# Patient Record
Sex: MALE | Race: WHITE | HISPANIC OR LATINO | Employment: UNEMPLOYED | URBAN - METROPOLITAN AREA
[De-identification: names, ages, dates, MRNs, and addresses within clinical notes are randomized per-mention and may not be internally consistent; named-entity substitution may affect disease eponyms.]

---

## 2018-04-25 ENCOUNTER — OFFICE VISIT (OUTPATIENT)
Dept: FAMILY MEDICINE CLINIC | Facility: CLINIC | Age: 53
End: 2018-04-25
Payer: COMMERCIAL

## 2018-04-25 VITALS
BODY MASS INDEX: 28.19 KG/M2 | RESPIRATION RATE: 18 BRPM | SYSTOLIC BLOOD PRESSURE: 122 MMHG | DIASTOLIC BLOOD PRESSURE: 76 MMHG | TEMPERATURE: 100.4 F | WEIGHT: 186 LBS | OXYGEN SATURATION: 97 % | HEART RATE: 94 BPM | HEIGHT: 68 IN

## 2018-04-25 DIAGNOSIS — R09.81 CONGESTION OF NASAL SINUS: ICD-10-CM

## 2018-04-25 DIAGNOSIS — B34.9 VIRAL ILLNESS: Primary | ICD-10-CM

## 2018-04-25 PROCEDURE — 99213 OFFICE O/P EST LOW 20 MIN: CPT | Performed by: NURSE PRACTITIONER

## 2018-04-25 PROCEDURE — 3008F BODY MASS INDEX DOCD: CPT | Performed by: NURSE PRACTITIONER

## 2018-04-25 RX ORDER — GUAIFENESIN 600 MG
1200 TABLET, EXTENDED RELEASE 12 HR ORAL EVERY 12 HOURS SCHEDULED
Qty: 14 TABLET | Refills: 0 | Status: SHIPPED | OUTPATIENT
Start: 2018-04-25 | End: 2018-10-25 | Stop reason: ALTCHOICE

## 2018-04-25 RX ORDER — FLUTICASONE PROPIONATE 50 MCG
2 SPRAY, SUSPENSION (ML) NASAL DAILY
Qty: 16 G | Refills: 0 | Status: SHIPPED | OUTPATIENT
Start: 2018-04-25 | End: 2018-10-25 | Stop reason: ALTCHOICE

## 2018-04-25 NOTE — PROGRESS NOTES
Assessment/Plan:    1  You could take NSAID for symptom relief  2  Make sure you drink plenty fluids weight all feel that  3  Follow up if condition changes or worsens     Diagnoses and all orders for this visit:    Viral illness  -     fluticasone (FLONASE) 50 mcg/act nasal spray; 2 sprays into each nostril daily  -     guaiFENesin (MUCINEX) 600 mg 12 hr tablet; Take 2 tablets (1,200 mg total) by mouth every 12 (twelve) hours    Congestion of nasal sinus  -     fluticasone (FLONASE) 50 mcg/act nasal spray; 2 sprays into each nostril daily  -     guaiFENesin (MUCINEX) 600 mg 12 hr tablet; Take 2 tablets (1,200 mg total) by mouth every 12 (twelve) hours          Subjective:      Patient ID: Ashley Coker is a 47 y o  male  A 69-year-old male presents with fever and cough for a day and half  Feels really sick  Cough is dry  Took some ibuprofen this morning  Helped little  Has a lot of postnasal drip  Body aches  The following portions of the patient's history were reviewed and updated as appropriate: allergies and current medications  Review of Systems   Constitutional: Positive for fatigue and fever  HENT: Positive for congestion and postnasal drip  Respiratory: Positive for cough  Cardiovascular: Negative  Objective:      /76   Pulse 94   Temp 100 4 °F (38 °C)   Resp 18   Ht 5' 7 7" (1 72 m)   Wt 84 4 kg (186 lb)   SpO2 97%   BMI 28 53 kg/m²          Physical Exam   Constitutional: He appears well-developed and well-nourished  HENT:   Head: Normocephalic  Right Ear: External ear normal    Left Ear: External ear normal    Nose: Right sinus exhibits maxillary sinus tenderness  Left sinus exhibits maxillary sinus tenderness  Mouth/Throat: Oropharynx is clear and moist    Cardiovascular: Normal rate, regular rhythm and normal heart sounds      Pulmonary/Chest:   Cough/dry

## 2018-04-25 NOTE — LETTER
April 25, 2018     Patient: Naya Sharma   YOB: 1965   Date of Visit: 4/25/2018       To Whom it May Concern:    Joaquim Agarwal was seen in my clinic on 4/25/2018  He may return to work on 4/27/18  If you have any questions or concerns, please don't hesitate to call           Sincerely,          Chuck Fox NP        CC: No Recipients

## 2018-04-25 NOTE — PATIENT INSTRUCTIONS
Viral Syndrome   AMBULATORY CARE:   Viral syndrome  is a term used for general symptoms of a viral infection that has no clear cause  Viruses are spread easily from person to person through the air and on shared items  Common symptoms include the following:   · Fever and chills    · A runny or stuffy nose     · Cough, sore throat, or hoarseness     · Headache, or pain and pressure around your eyes     · Muscle aches and joint pain     · Shortness of breath or wheezing     · Abdominal pain, cramps, and diarrhea     · Nausea, vomiting, or loss of appetite  Call 911 for the following:   · You have a seizure  · You cannot be woken  · You have chest pain or trouble breathing  Seek care immediately if:   · You have a stiff neck, a bad headache, and sensitivity to light  · You feel weak, dizzy, or confused  · You stop urinating or urinate a lot less than normal      · You cough up blood or thick, yellow or green, mucus  · You have severe abdominal pain or your abdomen is larger than usual   Contact your healthcare provider if:   · Your symptoms do not get better with treatment, or get worse, after 3 days  · You have a rash or ear pain  · You have burning when you urinate  · You have questions or concerns about your condition or care  Treatment for viral syndrome  may include medicines to manage your symptoms  You may  need any of the following:  · Acetaminophen  decreases pain and fever  It is available without a doctor's order  Ask how much medicine to take and how often to take it  Follow directions  Acetaminophen can cause liver damage if not taken correctly  · NSAIDs , such as ibuprofen, help decrease swelling, pain, and fever  NSAIDs can cause stomach bleeding or kidney problems in certain people  If you take blood thinner medicine, always ask your healthcare provider if NSAIDs are safe for you  Always read the medicine label and follow directions      · Cold medicine  helps decrease swelling, control a cough, and relieve chest or nasal congestion  · Saline nasal spray  helps decrease nasal congestion  · Take your medicine as directed  Contact your healthcare provider if you think your medicine is not helping or if you have side effects  Tell him of her if you are allergic to any medicine  Keep a list of the medicines, vitamins, and herbs you take  Include the amounts, and when and why you take them  Bring the list or the pill bottles to follow-up visits  Carry your medicine list with you in case of an emergency  Manage your symptoms:   · Drink liquids as directed  to prevent dehydration  Ask how much liquid to drink each day and which liquids are best for you  Ask if you should drink an oral rehydration solution (ORS)  An ORS has the right amounts of water, salts, and sugar you need to replace body fluids  This may help prevent dehydration caused by vomiting or diarrhea  Do not drink liquids with caffeine  Drinks with caffeine can make dehydration worse  · Get plenty of rest  to help your body heal  Take naps throughout the day  Ask your healthcare provider when you can return to work and your normal activities  · Use a cool mist humidifier  to help you breathe easier if you have nasal or chest congestion  Ask your healthcare provider how to use a cool mist humidifier  · Eat honey or use cough drops  to help decrease throat discomfort  Ask your healthcare provider how much honey you should eat each day  Cough drops are available without a doctor's order  Follow directions for taking cough drops  · Do not smoke and stay away from others who smoke  Nicotine and other chemicals in cigarettes and cigars can cause lung damage  Smoking can also delay healing  Ask your healthcare provider for information if you currently smoke and need help to quit  E-cigarettes or smokeless tobacco still contain nicotine   Talk to your healthcare provider before you use these products  · Wash your hands frequently  to prevent the spread of germs to others  Use soap and water  Use gel hand  when soap and water are not available  Wash your hands after you use the bathroom, cough, or sneeze  Wash your hands before you prepare or eat food  Follow up with your healthcare provider as directed:  Write down your questions so you remember to ask them during your visits  © 2017 [facility] Information is for End User's use only and may not be sold, redistributed or otherwise used for commercial purposes  All illustrations and images included in CareNotes® are the copyrighted property of A D A M , Inc  or [employer]h Payne  The above information is an  only  It is not intended as medical advice for individual conditions or treatments  Talk to your doctor, nurse or pharmacist before following any medical regimen to see if it is safe and effective for you

## 2018-04-26 ENCOUNTER — APPOINTMENT (EMERGENCY)
Dept: RADIOLOGY | Facility: HOSPITAL | Age: 53
End: 2018-04-26
Payer: COMMERCIAL

## 2018-04-26 ENCOUNTER — HOSPITAL ENCOUNTER (EMERGENCY)
Facility: HOSPITAL | Age: 53
Discharge: HOME | End: 2018-04-27
Attending: EMERGENCY MEDICINE | Admitting: EMERGENCY MEDICINE
Payer: COMMERCIAL

## 2018-04-26 DIAGNOSIS — R09.82 POSTNASAL DRIP: ICD-10-CM

## 2018-04-26 DIAGNOSIS — J20.9 ACUTE BRONCHITIS: Primary | ICD-10-CM

## 2018-04-26 PROCEDURE — 94640 AIRWAY INHALATION TREATMENT: CPT

## 2018-04-26 PROCEDURE — 71046 X-RAY EXAM CHEST 2 VIEWS: CPT

## 2018-04-26 RX ORDER — IPRATROPIUM BROMIDE AND ALBUTEROL SULFATE 2.5; .5 MG/3ML; MG/3ML
3 SOLUTION RESPIRATORY (INHALATION) ONCE
Status: COMPLETED | OUTPATIENT
Start: 2018-04-26 | End: 2018-04-26

## 2018-04-26 RX ORDER — HYDROCODONE POLISTIREX AND CHLORPHENIRAMINE POLISTIREX 10; 8 MG/5ML; MG/5ML
5 SUSPENSION, EXTENDED RELEASE ORAL EVERY 12 HOURS PRN
Status: DISCONTINUED | OUTPATIENT
Start: 2018-04-26 | End: 2018-04-27 | Stop reason: HOSPADM

## 2018-04-26 RX ORDER — PREDNISONE 20 MG/1
60 TABLET ORAL ONCE
Status: COMPLETED | OUTPATIENT
Start: 2018-04-26 | End: 2018-04-26

## 2018-04-26 RX ADMIN — IPRATROPIUM BROMIDE AND ALBUTEROL SULFATE 3 ML: .5; 3 SOLUTION RESPIRATORY (INHALATION) at 23:48

## 2018-04-26 RX ADMIN — PREDNISONE 60 MG: 20 TABLET ORAL at 23:48

## 2018-04-27 VITALS
SYSTOLIC BLOOD PRESSURE: 142 MMHG | BODY MASS INDEX: 28.25 KG/M2 | HEART RATE: 98 BPM | HEIGHT: 67 IN | RESPIRATION RATE: 20 BRPM | OXYGEN SATURATION: 98 % | DIASTOLIC BLOOD PRESSURE: 81 MMHG | TEMPERATURE: 100.3 F | WEIGHT: 180 LBS

## 2018-04-27 PROCEDURE — 99283 EMERGENCY DEPT VISIT LOW MDM: CPT

## 2018-04-27 RX ORDER — IBUPROFEN 600 MG/1
600 TABLET ORAL ONCE
Status: COMPLETED | OUTPATIENT
Start: 2018-04-27 | End: 2018-04-27

## 2018-04-27 RX ORDER — PREDNISONE 50 MG/1
50 TABLET ORAL DAILY
Qty: 5 TABLET | Refills: 0 | Status: SHIPPED | OUTPATIENT
Start: 2018-04-27 | End: 2018-05-02

## 2018-04-27 RX ORDER — ALBUTEROL SULFATE 90 UG/1
2 AEROSOL, METERED RESPIRATORY (INHALATION) EVERY 4 HOURS PRN
Qty: 1 INHALER | Refills: 0 | Status: SHIPPED | OUTPATIENT
Start: 2018-04-27 | End: 2020-02-27

## 2018-04-27 RX ORDER — FLUTICASONE PROPIONATE 50 MCG
2 SPRAY, SUSPENSION (ML) NASAL DAILY
Qty: 1 BOTTLE | Refills: 0 | Status: SHIPPED | OUTPATIENT
Start: 2018-04-27 | End: 2018-10-25 | Stop reason: ALTCHOICE

## 2018-04-27 RX ORDER — PROMETHAZINE HYDROCHLORIDE AND CODEINE PHOSPHATE 6.25; 1 MG/5ML; MG/5ML
5 SYRUP ORAL EVERY 4 HOURS PRN
Qty: 120 ML | Refills: 0 | Status: SHIPPED | OUTPATIENT
Start: 2018-04-27 | End: 2018-05-07

## 2018-04-27 RX ADMIN — IBUPROFEN 600 MG: 600 TABLET, FILM COATED ORAL at 00:21

## 2018-04-27 RX ADMIN — HYDROCODONE POLISTIREX AND CHLORPHENIRAMINE POLISTIREX 5 ML: 10; 8 SUSPENSION, EXTENDED RELEASE ORAL at 00:21

## 2018-04-27 NOTE — DISCHARGE INSTRUCTIONS
Please take a list of all of your medications and discharge paperwork with you to all of your follow-up medical visits  Please take all of your medications as directed  Please call your family doctor or return to the ER if you have increased shortness of breath, chest pain, fevers, chills, nausea, vomiting, diarrhea, or any other worsening symptoms  Acute Bronchitis   WHAT YOU SHOULD KNOW:   Acute bronchitis is swelling and irritation in the air passages of your lungs  This irritation may cause you to cough or have other breathing problems  Acute bronchitis often starts because of another viral illness, such as a cold or the flu  The illness spreads from your nose and throat to your windpipe and airways  Bronchitis is often called a chest cold  Acute bronchitis lasts about 2 weeks and is usually not a serious illness  AFTER YOU LEAVE:   Medicines:   · Ibuprofen or acetaminophen:  These medicines help lower a fever  They are available without a doctor's order  Ask your healthcare provider which medicine is right for you  Ask how much to take and how often to take it  Follow directions  These medicines can cause stomach bleeding if not taken correctly  Ibuprofen can cause kidney damage  Do not take ibuprofen if you have kidney disease, an ulcer, or allergies to aspirin  Acetaminophen can cause liver damage  Do not drink alcohol if you take acetaminophen  · Cough medicine: This medicine helps loosen mucus in your lungs and make it easier to cough up  This can help you breathe easier  · Inhalers: You may need one or more inhalers to help you breathe easier and cough less  An inhaler gives your medicine in a mist form so that you can breathe it into your lungs  Ask your healthcare provider to show you how to use your inhaler correctly  · Steroid medicine:  Steroid medicine helps open your air passages so you can breathe easier  · Take your medicine as directed    Call your healthcare provider if you think your medicine is not helping or if you have side effects  Tell him if you are allergic to any medicine  Keep a list of the medicines, vitamins, and herbs you take  Include the amounts, and when and why you take them  Bring the list or the pill bottles to follow-up visits  Carry your medicine list with you in case of an emergency  How to use an inhaler:   · Shake the inhaler well to make sure you get the correct amount of medicine per puff  Remove the cover from your inhaler's mouthpiece  If you are using a spacer, connect your inhaler to the flat end of the spacer  · Exhale as much air from your lungs as you can  Put the mouthpiece in your mouth past your front teeth and rest it on the top of your tongue  Do not block the mouthpiece opening with your tongue  · Breathe in through your mouth at a slow and steady rate  As you do this, press the inhaler to release the puff of medicine  Finish breathing in slowly and deeply as you inhale the medicine  When your lungs are full, hold your breath for 10 seconds  Then breathe out slowly through puckered lips or through your nose  · If you need to take more puffs, wait at least 1 minute between each puff  · Rinse your mouth with water after you use the inhaler  This may keep you from getting a mouth infection or irritation  · Follow the instructions that come with your inhaler to clean it  You should clean your inhaler at least once a week  Ways to care for yourself:   · Avoid alcohol:  Alcohol dulls your urge to cough and sneeze  When you have bronchitis, you need to be able to cough and sneeze to clear your air passages  Alcohol also causes your body to lose fluid  This can make the mucus in your lungs thicker and harder to cough up  · Avoid irritants in the air:  Do not smoke or allow others to smoke around you  Avoid chemicals, fumes, and dust  Wear a face mask if you must work around dust or fumes   Stay inside on days when air pollution levels are high  If you have allergies, stay inside when pollen counts are high  Avoid aerosol products  This includes spray-on deodorant, bug spray, and hair spray  · Drink more liquids:  Most people should drink at least 8 eight-ounce cups of water a day  You may need to drink more liquids when you have acute bronchitis  Liquids help keep your air passages moist and help you cough up mucus  · Get more rest:  You may feel like resting more  Slowly start to do more each day  Rest when you feel it is needed  · Eat healthy foods:  Eat a variety healthy foods every day  Your diet should include fruits, vegetables, breads, and protein (such as chicken, fish, and beans)  Dairy products (such as milk, cheese, and ice cream) can sometimes increase the amount of mucus your body makes  Ask if you should decrease your intake of dairy products  · Use a humidifier:  Use a cool mist humidifier to increase air moisture in your home  This may make it easier for you to breathe and help decrease your cough  Decrease your risk of acute bronchitis:   · Get the vaccinations you need:  Ask your healthcare provider if you should get vaccinated against the flu or pneumonia  · Avoid things that may irritate your lungs:  Stay inside or cover your mouth and nose with a scarf when you are outside during cold weather  You should also stay inside on days when air pollution levels are high  If you have allergies, stay inside when pollen counts are high  Avoid using aerosol products in your home  This includes spray-on deodorant, bug spray, and hair spray  · Avoid the spread of germs:        [facility] your hands often with soap and water  Carry germ-killing gel with you  You can use the gel to clean your hands when there is no soap and water available  ¨ Do not touch your eyes, nose, or mouth unless you have washed your hands first     ¨ Always cover your mouth when you cough   Cough into a tissue or your shirtsleeve so you do not spread germs from your hands  ¨ Try to avoid people who have a cold or the flu  If you are sick, stay away from others as much as possible  Follow up with your healthcare provider as directed:  Write down questions you have so you will remember to ask them during your follow-up visits  Contact your healthcare provider if:   · You have a fever  · Your skin becomes itchy or you have a rash after you take your medicine  · Your breathing problems do not go away or get worse  · Your cough does not get better with treatment  · You cough up blood  · You have questions or concerns about your condition or care  Seek care immediately or call 911 if:   · You faint  · Your lips or fingernails turn blue  · You feel like you are not getting enough air when you breathe  · You have swelling of your lips, tongue, or throat that makes it hard to breathe or swallow  © 2014 [employer] is for End User's use only and may not be sold, redistributed or otherwise used for commercial purposes  All illustrations and images included in CareNotes® are the copyrighted property of A D A M , Inc  or [employer]h Payne  The above information is an  only  It is not intended as medical advice for individual conditions or treatments  Talk to your doctor, nurse or pharmacist before following any medical regimen to see if it is safe and effective for you

## 2018-04-27 NOTE — ED PROVIDER NOTES
History  Chief Complaint   Patient presents with    Cough     states that he has had a dry cough for 4 days, cannot sleep and cough irritating his throat  Seen at [facility] yesterday and told to take mucinex  Mucinex not helping  65-year-old otherwise healthy male presents to the ER with complaint of dry cough and postnasal drip for the past 4 days  Patient was seen by his PCP yesterday and discussed supportive care and started on Mucinex  Patient states that Mucinex case not helping  Patient has subjective fever last night  Patient came to the ER for further evaluation  Patient has mild shortness of breath but denies any chest pain, nausea, vomiting, diarrhea, dysuria, abdominal pain, headaches, weakness, dizziness  History provided by:  Patient  Cough   Associated symptoms: shortness of breath    Associated symptoms: no chest pain, no fever, no headaches, no sore throat and no wheezing        Prior to Admission Medications   Prescriptions Last Dose Informant Patient Reported? Taking?   fluticasone (FLONASE) 50 mcg/act nasal spray 2018 at Unknown time  No Yes   Si sprays into each nostril daily   guaiFENesin (MUCINEX) 600 mg 12 hr tablet 2018 at Unknown time  No Yes   Sig: Take 2 tablets (1,200 mg total) by mouth every 12 (twelve) hours      Facility-Administered Medications: None       History reviewed  No pertinent past medical history  History reviewed  No pertinent surgical history  Family History   Problem Relation Age of Onset    No Known Problems Mother     No Known Problems Father      I have reviewed and agree with the history as documented  Social History   Substance Use Topics    Smoking status: Never Smoker    Smokeless tobacco: Never Used    Alcohol use No        Review of Systems   Constitutional: Negative for activity change, fatigue and fever  HENT: Negative for congestion, ear discharge and sore throat  Eyes: Negative for pain and redness  Respiratory: Positive for cough and shortness of breath  Negative for chest tightness and wheezing  Cardiovascular: Negative for chest pain  Gastrointestinal: Negative for abdominal pain, diarrhea, nausea and vomiting  Endocrine: Negative for cold intolerance  Genitourinary: Negative for dysuria and urgency  Musculoskeletal: Negative for arthralgias and back pain  Neurological: Negative for dizziness, weakness and headaches  Psychiatric/Behavioral: Negative for agitation and behavioral problems  Physical Exam  ED Triage Vitals [04/26/18 2233]   Temperature Pulse Respirations Blood Pressure SpO2   100 1 °F (37 8 °C) 102 20 127/69 98 %      Temp Source Heart Rate Source Patient Position - Orthostatic VS BP Location FiO2 (%)   Tympanic Monitor Sitting Left arm --      Pain Score       9           Orthostatic Vital Signs  Vitals:    04/26/18 2233 04/27/18 0013   BP: 127/69 142/81   Pulse: 102 98   Patient Position - Orthostatic VS: Sitting        Physical Exam   Constitutional: He is oriented to person, place, and time  He appears well-developed and well-nourished  HENT:   Head: Normocephalic and atraumatic  Right Ear: External ear normal    Left Ear: External ear normal    Nose: Nose normal    Mouth/Throat: Oropharynx is clear and moist    Erythematous posterior oropharynx without any exudates  Eyes: Conjunctivae and EOM are normal    Neck: Normal range of motion  Neck supple  Cardiovascular: Normal rate, regular rhythm and normal heart sounds  Pulmonary/Chest: Effort normal and breath sounds normal    Lungs are clear to auscultation  Abdominal: Soft  Bowel sounds are normal  He exhibits no distension  There is no tenderness  Musculoskeletal: Normal range of motion  Neurological: He is alert and oriented to person, place, and time  Skin: Skin is warm  Psychiatric: He has a normal mood and affect   His behavior is normal  Judgment and thought content normal    Nursing note and vitals reviewed  ED Medications  Medications   hydrocodone-chlorpheniramine polistirex (TUSSIONEX) 10-8 mg/5 mL ER suspension 5 mL (5 mL Oral Given 4/27/18 0021)   ipratropium-albuterol (DUO-NEB) 0 5-2 5 mg/3 mL inhalation solution 3 mL (3 mL Nebulization Given 4/26/18 2348)   predniSONE tablet 60 mg (60 mg Oral Given 4/26/18 2348)   ibuprofen (MOTRIN) tablet 600 mg (600 mg Oral Given 4/27/18 0021)       Diagnostic Studies  Results Reviewed     None                 XR chest 2 views    (Results Pending)              Procedures  Procedures       Phone Contacts  ED Phone Contact    ED Course                               MDM  Number of Diagnoses or Management Options  Acute bronchitis: new and requires workup  Postnasal drip: new and requires workup  Diagnosis management comments: Obtain x-ray to rule out any acute abnormalities  Give prednisone, neb treatment, Tussionex and reassess symptoms       Amount and/or Complexity of Data Reviewed  Tests in the radiology section of CPT®: reviewed and ordered  Tests in the medicine section of CPT®: ordered and reviewed  Independent visualization of images, tracings, or specimens: yes    Risk of Complications, Morbidity, and/or Mortality  General comments: Chest x-ray is unremarkable  Patient's symptoms mildly improved with neb treatment, prednisone, Tussionex  At this point patient's symptoms are consistent with bronchitis and postnasal drip  Patient is discharged home on prednisone burst, cough syrup, Flonase, albuterol with follow-up to PCP in 1 week  Close return instructions given to return to the ER for any worsening symptoms  Patient agrees with discharge plan  Patient well appearing at time of discharge        Patient Progress  Patient progress: improved    CritCare Time    Disposition  Final diagnoses:   Acute bronchitis   Postnasal drip     Time reflects when diagnosis was documented in both MDM as applicable and the Disposition within this note     Time User Action Codes Description Comment    4/27/2018  1:09 AM Vernadine Showers, Gregor Douglas Add [J20 [id number]] Acute bronchitis     4/27/2018  1:09 AM FerdousGregor Douglas Add [R09 [id number]] Postnasal drip       ED Disposition     ED Disposition Condition Comment    Discharge  Barbara Barefoot discharge to home/self care  Condition at discharge: Good        Follow-up Information     Follow up With Specialties Details Why [address]  In 1 week  Jasen Gayle [id number]        Patient's Medications   Discharge Prescriptions    ALBUTEROL (PROVENTIL HFA,VENTOLIN HFA) 90 MCG/ACT INHALER    Inhale 2 puffs every 4 (four) hours as needed for wheezing       Start Date: 4/27/2018 End Date: --       Order Dose: 2 puffs       Quantity: 1 Inhaler    Refills: 0    FLUTICASONE (FLONASE) 50 MCG/ACT NASAL SPRAY    2 sprays into each nostril daily for 30 days       Start Date: 4/27/2018 End Date: 5/27/2018       Order Dose: 2 sprays       Quantity: 1 Bottle    Refills: 0    PREDNISONE 50 MG TABLET    Take 1 tablet (50 mg total) by mouth daily for 5 days       Start Date: 4/27/2018 End Date: 5/2/2018       Order Dose: 50 mg       Quantity: 5 tablet    Refills: 0    PROMETHAZINE-CODEINE (PHENERGAN WITH CODEINE) 6 25-10 MG/5 ML SYRUP    Take 5 mL by mouth every 4 (four) hours as needed for cough for up to 10 days       Start Date: 4/27/2018 End Date: 5/7/2018       Order Dose: 5 mL       Quantity: 120 mL    Refills: 0     No discharge procedures on file      ED Provider  Electronically Signed by           Chu Robert DO  04/27/18 6266

## 2018-08-28 ENCOUNTER — APPOINTMENT (OUTPATIENT)
Dept: LAB | Facility: HOSPITAL | Age: 53
End: 2018-08-28

## 2018-08-28 ENCOUNTER — TRANSCRIBE ORDERS (OUTPATIENT)
Dept: LAB | Facility: HOSPITAL | Age: 53
End: 2018-08-28

## 2018-08-28 DIAGNOSIS — Z00.8 HEALTH EXAMINATION IN POPULATION SURVEY: Primary | ICD-10-CM

## 2018-08-28 DIAGNOSIS — Z00.8 HEALTH EXAMINATION IN POPULATION SURVEY: ICD-10-CM

## 2018-08-28 LAB
CHOLEST SERPL-MCNC: 218 MG/DL (ref 50–200)
EST. AVERAGE GLUCOSE BLD GHB EST-MCNC: 117 MG/DL
HBA1C MFR BLD: 5.7 % (ref 4.2–6.3)
HDLC SERPL-MCNC: 30 MG/DL (ref 40–60)
LDLC SERPL CALC-MCNC: 118 MG/DL (ref 0–100)
NONHDLC SERPL-MCNC: 188 MG/DL
TRIGL SERPL-MCNC: 350 MG/DL

## 2018-08-28 PROCEDURE — 83036 HEMOGLOBIN GLYCOSYLATED A1C: CPT

## 2018-08-28 PROCEDURE — 36415 COLL VENOUS BLD VENIPUNCTURE: CPT

## 2018-08-28 PROCEDURE — 80061 LIPID PANEL: CPT

## 2018-10-25 ENCOUNTER — OFFICE VISIT (OUTPATIENT)
Dept: FAMILY MEDICINE CLINIC | Facility: CLINIC | Age: 53
End: 2018-10-25
Payer: COMMERCIAL

## 2018-10-25 VITALS
SYSTOLIC BLOOD PRESSURE: 138 MMHG | HEART RATE: 90 BPM | BODY MASS INDEX: 28.19 KG/M2 | OXYGEN SATURATION: 97 % | RESPIRATION RATE: 20 BRPM | DIASTOLIC BLOOD PRESSURE: 80 MMHG | TEMPERATURE: 97.7 F | WEIGHT: 180 LBS

## 2018-10-25 DIAGNOSIS — J98.8 NARROWING OF AIRWAY: ICD-10-CM

## 2018-10-25 DIAGNOSIS — J01.90 ACUTE SINUSITIS, RECURRENCE NOT SPECIFIED, UNSPECIFIED LOCATION: Primary | ICD-10-CM

## 2018-10-25 PROCEDURE — 99202 OFFICE O/P NEW SF 15 MIN: CPT | Performed by: FAMILY MEDICINE

## 2018-10-25 RX ORDER — FLUTICASONE PROPIONATE 50 MCG
1 SPRAY, SUSPENSION (ML) NASAL DAILY
Qty: 16 G | Refills: 0 | Status: SHIPPED | OUTPATIENT
Start: 2018-10-25 | End: 2020-02-03

## 2018-10-25 RX ORDER — AMOXICILLIN AND CLAVULANATE POTASSIUM 875; 125 MG/1; MG/1
1 TABLET, FILM COATED ORAL EVERY 12 HOURS SCHEDULED
Qty: 10 TABLET | Refills: 0 | Status: SHIPPED | OUTPATIENT
Start: 2018-10-25 | End: 2018-10-30

## 2018-10-25 RX ORDER — AMOXICILLIN AND CLAVULANATE POTASSIUM 875; 125 MG/1; MG/1
1 TABLET, FILM COATED ORAL EVERY 12 HOURS SCHEDULED
Qty: 10 TABLET | Refills: 0 | Status: SHIPPED | OUTPATIENT
Start: 2018-10-25 | End: 2018-10-25 | Stop reason: CLARIF

## 2018-10-25 NOTE — PROGRESS NOTES
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

## 2018-10-28 NOTE — PROGRESS NOTES
Assessment/Plan:    No problem-specific Assessment & Plan notes found for this encounter  Diagnoses and all orders for this visit:    Acute sinusitis, recurrence not specified, unspecified location  -     amoxicillin-clavulanate (AUGMENTIN) 875-125 mg per tablet; Take 1 tablet by mouth every 12 (twelve) hours for 5 days  -     dextromethorphan-guaifenesin (MUCINEX DM)  MG per 12 hr tablet; Take 1 tablet by mouth every 12 (twelve) hours  -     fluticasone (FLONASE) 50 mcg/act nasal spray; 1 spray into each nostril daily    Narrowing of airway  -     Ambulatory Referral to Otolaryngology; Future        Subjective:      Patient ID: Maicol Mondragon is a 46 y o  male  HPI    Patient present with increased throat pain and coughing since last night  He started feeling sick 2-3 days ago and it began with just a productive cough  The cough and throat pain have progressively gotten worse since then to the point of preventing the patient from sleeping  Patient feels increased pressure on his palate and sinuses and dripping in to the back of his throat      Patient also complains of headaches, runny nose, chest pain when clearing throat and shortness of breath when trying to clear phlegm  He also endorses a subjective fever earlier today afternoon while driving  Patient denies nausea, vomiting, diarrhea, constipation and rashes      History of sinus problems for which he saw an ENT doctor 2 year ago  He was diagnosed with a narrowed airway at that time and recommended to get surgery  Patient has apnea sometime while he sleeps due to this problem  He has no gotten the surgery done yet due to insurance reasons      Patient has no medical history, takes no medication, and has no allergies  He denies tobacco, alcohol or illicit drug use  Patient has not gotten his flu shot this year  Review of Systems   Constitutional: Positive for fever  Negative for fatigue and unexpected weight change     HENT: Positive for sinus pressure and sore throat  Negative for rhinorrhea  Respiratory: Positive for apnea and cough  Negative for shortness of breath  Cardiovascular: Negative for chest pain and leg swelling  Gastrointestinal: Negative for abdominal distention, abdominal pain, constipation, diarrhea, nausea and vomiting  Genitourinary: Negative for difficulty urinating  Skin: Negative  Neurological: Negative for dizziness and headaches  Objective:  /80   Pulse 90   Temp 97 7 °F (36 5 °C)   Resp 20   Wt 81 6 kg (180 lb)   SpO2 97%   BMI 28 19 kg/m²        Physical Exam   Constitutional: He is oriented to person, place, and time  He appears well-developed and well-nourished  No distress  HENT:   Head: Normocephalic and atraumatic  Right Ear: A middle ear effusion is present  Left Ear: Tympanic membrane is erythematous  A middle ear effusion is present  Mouth/Throat: No oropharyngeal exudate or posterior oropharyngeal erythema  Narrowed airway   Eyes: Pupils are equal, round, and reactive to light  Neck: Normal range of motion  Neck supple  Cardiovascular: Normal rate, regular rhythm and normal heart sounds  Exam reveals no gallop and no friction rub  No murmur heard  Pulmonary/Chest: Effort normal and breath sounds normal  No respiratory distress  He has no wheezes  He has no rales  Abdominal: Soft  Bowel sounds are normal  He exhibits no distension  There is no tenderness  There is no rebound and no guarding  Musculoskeletal: He exhibits no edema  Lymphadenopathy:     He has cervical adenopathy  Neurological: He is alert and oriented to person, place, and time  Skin: Skin is warm and dry  No rash noted  He is not diaphoretic  No erythema  No pallor  Psychiatric: He has a normal mood and affect  His behavior is normal    Vitals reviewed          Elam Meigs, MD

## 2019-07-24 ENCOUNTER — OFFICE VISIT (OUTPATIENT)
Dept: FAMILY MEDICINE CLINIC | Facility: CLINIC | Age: 54
End: 2019-07-24
Payer: COMMERCIAL

## 2019-07-24 VITALS
DIASTOLIC BLOOD PRESSURE: 80 MMHG | HEIGHT: 67 IN | HEART RATE: 100 BPM | BODY MASS INDEX: 30.45 KG/M2 | OXYGEN SATURATION: 100 % | SYSTOLIC BLOOD PRESSURE: 122 MMHG | TEMPERATURE: 98.5 F | WEIGHT: 194 LBS

## 2019-07-24 DIAGNOSIS — R19.7 DIARRHEA, UNSPECIFIED TYPE: Primary | ICD-10-CM

## 2019-07-24 PROBLEM — B34.9 VIRAL ILLNESS: Status: RESOLVED | Noted: 2018-04-25 | Resolved: 2019-07-24

## 2019-07-24 PROCEDURE — 99213 OFFICE O/P EST LOW 20 MIN: CPT | Performed by: NURSE PRACTITIONER

## 2019-07-24 NOTE — PROGRESS NOTES
Assessment/Plan:  1  Take Pepto-Bismol to slow down the transit of diarrhea  2  Make sure you replace her fluids while feeling ill  3  You can modify her diet to a more binding diet like up brat diet  4  Follow up if condition changes or worsens       Diagnoses and all orders for this visit:    Diarrhea, unspecified type          Subjective:      Patient ID: Ed Beck is a 53 y o  male  58-year-old male presents with diarrhea for approximately 3 days  Just returned from [facility] on Monday  Reports the diarrhea started when he returned  Reports some intestinal cramping and distress  Denies any changes in appetite  Denies nausea/vomiting  Denies medications  Denies fever  Reports that the pain is generalized in his abdomen  The following portions of the patient's history were reviewed and updated as appropriate: allergies and current medications  Review of Systems   Constitutional: Negative  Respiratory: Negative  Cardiovascular: Negative  Gastrointestinal: Positive for diarrhea  Objective:      /80   Pulse 100   Temp 98 5 °F (36 9 °C)   Ht 5' 7" (1 702 m)   Wt 88 kg (194 lb)   SpO2 100%   BMI 30 38 kg/m²          Physical Exam   Constitutional: He appears well-developed and well-nourished  Cardiovascular: Normal rate and regular rhythm  Pulmonary/Chest: Effort normal and breath sounds normal    Abdominal: Soft   Bowel sounds are normal

## 2019-07-29 ENCOUNTER — OFFICE VISIT (OUTPATIENT)
Dept: FAMILY MEDICINE CLINIC | Facility: CLINIC | Age: 54
End: 2019-07-29
Payer: COMMERCIAL

## 2019-07-29 VITALS
OXYGEN SATURATION: 94 % | HEART RATE: 99 BPM | WEIGHT: 190.5 LBS | BODY MASS INDEX: 29.9 KG/M2 | SYSTOLIC BLOOD PRESSURE: 126 MMHG | HEIGHT: 67 IN | TEMPERATURE: 97.6 F | DIASTOLIC BLOOD PRESSURE: 74 MMHG

## 2019-07-29 DIAGNOSIS — E66.3 OVERWEIGHT (BMI 25.0-29.9): ICD-10-CM

## 2019-07-29 DIAGNOSIS — Z12.11 ENCOUNTER FOR SCREENING COLONOSCOPY: ICD-10-CM

## 2019-07-29 DIAGNOSIS — G47.33 OSA (OBSTRUCTIVE SLEEP APNEA): ICD-10-CM

## 2019-07-29 DIAGNOSIS — Z00.00 PHYSICAL EXAM: Primary | ICD-10-CM

## 2019-07-29 PROCEDURE — 99213 OFFICE O/P EST LOW 20 MIN: CPT | Performed by: FAMILY MEDICINE

## 2019-07-29 PROCEDURE — 3008F BODY MASS INDEX DOCD: CPT | Performed by: FAMILY MEDICINE

## 2019-07-29 PROCEDURE — 1036F TOBACCO NON-USER: CPT | Performed by: FAMILY MEDICINE

## 2019-07-29 NOTE — PROGRESS NOTES
Assessment/Plan:  Encounter for Screening colonoscopy  Patient follows with [facility] is here for referral for repeat colonoscopy as per patient he was found to have 12 polyps 4 years ago and is due a colonoscopy this year  Ambulatory referral to gastroenterology for colonoscopy was ordered  Overweight  Patient has a BMI of 29 likely due to increased caloric intake versus decreased caloric output  I advised the patient on exercise at least 30 min for 5 days a week as well as less meets fatty foods and processed foods and more plan based office diet  I also ordered a lipid panel and a CMP to look for any signs of hyper triglyceridemia or mixed hyperlipidemia as well as signs of fatty liver  I will follow up the results with the patient  MAIRO  Suspected hip  Next IM under measured 18 in  In males obstructive sleep apnea may be considered in neck diameter is greater than 17 in  I ordered an ambulatory referral to pulmonology for the patient to undergo a sleep study to determine if he does in fact have obstructive sleep apnea  Subjective:      Patient ID: Kylee Herbert is a 53 y o  male  HPI    41-year-old male with no previous hospitalizations or surgeries presents for physical examination  The patient worked in [employer] for 25 years before being let go and now works as a   The patient has no significant family history for diabetes coronary artery disease COPD  The patient denies any recent fever chills fatigue difficulty swallowing chest pain shortness of breath nausea vomiting diarrhea constipation loss of consciousness or lightheadedness  The patient does report instances where he will be trying to fall asleep and then will wake up suddenly to catch his breath as he is fearful of not getting in enough oxygen      The following portions of the patient's history were reviewed and updated as appropriate: allergies, current medications, past family history, past medical history, past social history, past surgical history and problem list     Review of Systems  Per HPI    Objective:      /74 (BP Location: Left arm, Patient Position: Sitting, Cuff Size: Large)   Pulse 99   Temp 97 6 °F (36 4 °C) (Tympanic)   Ht 5' 7" (1 702 m)   Wt 86 4 kg (190 lb 8 oz)   SpO2 94%   BMI 29 84 kg/m²          Physical Exam   Constitutional: He is oriented to person, place, and time  He appears well-developed  HENT:   Head: Normocephalic and atraumatic  Right Ear: External ear normal    Left Ear: External ear normal    Nose: Nose normal    Mouth/Throat: Oropharynx is clear and moist    Eyes: Pupils are equal, round, and reactive to light  Conjunctivae and EOM are normal    Neck: Neck supple  Cardiovascular: Normal rate, regular rhythm, normal heart sounds and intact distal pulses  Exam reveals no gallop and no friction rub  No murmur heard  Pulmonary/Chest: Effort normal and breath sounds normal    Abdominal: Soft  Bowel sounds are normal  He exhibits no distension and no mass  There is no tenderness  There is no guarding  No hernia  Genitourinary:   Genitourinary Comments: deferred   Musculoskeletal: Normal range of motion  He exhibits no edema  Neurological: He is alert and oriented to person, place, and time  He displays normal reflexes  No cranial nerve deficit or sensory deficit  He exhibits normal muscle tone  Coordination normal    Skin: Skin is warm  Capillary refill takes less than 2 seconds  Psychiatric: He has a normal mood and affect

## 2019-09-23 LAB
ALBUMIN SERPL-MCNC: 4.6 G/DL (ref 3.5–5.5)
ALBUMIN/GLOB SERPL: 1.8 {RATIO} (ref 1.2–2.2)
ALP SERPL-CCNC: 94 IU/L (ref 39–117)
ALT SERPL-CCNC: 47 IU/L (ref 0–44)
AST SERPL-CCNC: 27 IU/L (ref 0–40)
BILIRUB SERPL-MCNC: 1 MG/DL (ref 0–1.2)
BUN SERPL-MCNC: 16 MG/DL (ref 6–24)
BUN/CREAT SERPL: 15 (ref 9–20)
CALCIUM SERPL-MCNC: 9.8 MG/DL (ref 8.7–10.2)
CHLORIDE SERPL-SCNC: 101 MMOL/L (ref 96–106)
CHOLEST SERPL-MCNC: 231 MG/DL (ref 100–199)
CO2 SERPL-SCNC: 24 MMOL/L (ref 20–29)
CREAT SERPL-MCNC: 1.04 MG/DL (ref 0.76–1.27)
GLOBULIN SER-MCNC: 2.6 G/DL (ref 1.5–4.5)
GLUCOSE SERPL-MCNC: 102 MG/DL (ref 65–99)
HDLC SERPL-MCNC: 34 MG/DL
LDLC SERPL CALC-MCNC: 164 MG/DL (ref 0–99)
POTASSIUM SERPL-SCNC: 4.7 MMOL/L (ref 3.5–5.2)
PROT SERPL-MCNC: 7.2 G/DL (ref 6–8.5)
SL AMB EGFR AFRICAN AMERICAN: 93 ML/MIN/1.73
SL AMB EGFR NON AFRICAN AMERICAN: 80 ML/MIN/1.73
SODIUM SERPL-SCNC: 139 MMOL/L (ref 134–144)
TRIGL SERPL-MCNC: 164 MG/DL (ref 0–149)

## 2019-10-01 ENCOUNTER — OFFICE VISIT (OUTPATIENT)
Dept: PULMONOLOGY | Facility: MEDICAL CENTER | Age: 54
End: 2019-10-01
Payer: COMMERCIAL

## 2019-10-01 VITALS
BODY MASS INDEX: 29.25 KG/M2 | RESPIRATION RATE: 12 BRPM | HEART RATE: 77 BPM | WEIGHT: 193 LBS | DIASTOLIC BLOOD PRESSURE: 82 MMHG | HEIGHT: 68 IN | SYSTOLIC BLOOD PRESSURE: 122 MMHG | TEMPERATURE: 98.4 F | OXYGEN SATURATION: 98 %

## 2019-10-01 DIAGNOSIS — R05.9 COUGH: Primary | ICD-10-CM

## 2019-10-01 DIAGNOSIS — J45.991 COUGH VARIANT ASTHMA: ICD-10-CM

## 2019-10-01 DIAGNOSIS — G47.19 DAYTIME HYPERSOMNOLENCE: ICD-10-CM

## 2019-10-01 DIAGNOSIS — R05.3 PERSISTENT COUGH FOR 3 WEEKS OR LONGER: ICD-10-CM

## 2019-10-01 DIAGNOSIS — R09.82 POST-NASAL DRIP: ICD-10-CM

## 2019-10-01 DIAGNOSIS — G47.33 OSA (OBSTRUCTIVE SLEEP APNEA): ICD-10-CM

## 2019-10-01 PROCEDURE — 94010 BREATHING CAPACITY TEST: CPT | Performed by: NURSE PRACTITIONER

## 2019-10-01 PROCEDURE — 99204 OFFICE O/P NEW MOD 45 MIN: CPT | Performed by: NURSE PRACTITIONER

## 2019-10-01 NOTE — ASSESSMENT & PLAN NOTE
Patient reports having a persistent cough for many months  According to him he has had yellow thick mucus for the past 2 months  I am going to give him a sputum culture for him to collect  to the lab  Pulmonary function tests were reviewed there are normal   Forced vital capacity was 4 21 L or 93% of predicted, FEV V1 was 3 51 L or 99% and obstruction ratio 83%  This possible he could have cough variant asthma  I will give him a 2 week supply of Arnuity 200 mcg 1 puff daily  He is agreeable to this  I deferred any antibiotic at this time  If he does not find relief with the inhaled corticosteroid, I will wait to review sputum culture  In the interim, I have also sent referral to ENT  Patient reports persistent postnasal drip with excessive mucus    He would like to see an ENT and I am referring him to

## 2019-10-01 NOTE — PROGRESS NOTES
Assessment/Plan:       Problem List Items Addressed This Visit        Other    Daytime hypersomnolence     Patient has an [place] Sleepiness Scale of 15 and it and neck circumference of 17 25 inches  According to patient he is very tired during  The day and snores heavily  He likely is at high risk for this diagnosis  It appears that he has a previous history and was given a CPAP many many years ago when he lived in [address] [place] [address] Dr  He no longer has a CPAP machine  He is agreeable to have a home sleep study  Relevant Orders    Diagnostic Sleep Study    Cough - Primary     Patient reports having a persistent cough for many months  According to him he has had yellow thick mucus for the past 2 months  I am going to give him a sputum culture for him to collect  to the lab  Pulmonary function tests were reviewed there are normal   Forced vital capacity was 4 21 L or 93% of predicted, FEV V1 was 3 51 L or 99% and obstruction ratio 83%  This possible he could have cough variant asthma  I will give him a 2 week supply of Arnuity 200 mcg 1 puff daily  He is agreeable to this  I deferred any antibiotic at this time  If he does not find relief with the inhaled corticosteroid, I will wait to review sputum culture  In the interim, I have also sent referral to ENT  Patient reports persistent postnasal drip with excessive mucus  He would like to see an ENT and I am referring him to           Relevant Orders    POCT spirometry (Completed)    Sputum culture and Gram stain    Ambulatory Referral to Otolaryngology      Other Visit Diagnoses     MARIO (obstructive sleep apnea)        Persistent cough for 3 weeks or longer        Cough variant asthma        Relevant Medications    fluticasone (ARNUITY ELLIPTA) 200 MCG/ACT AEPB inhaler    Post-nasal drip        Relevant Orders    Ambulatory Referral to Otolaryngology            Return in about 6 months (around 4/1/2020)    All questions are answered to the patient's satisfaction and understanding  He verbalizes understanding  He is encouraged to call with any further questions or concerns  Portions of the record may have been created with voice recognition software  Occasional wrong word or "sound a like" substitutions may have occurred due to the inherent limitations of voice recognition software  Read the chart carefully and recognize, using context, where substitutions have occurred  a  HPI:  Patient is a 50-year-old male with chief complaint of cough  This patient reports that he has had the cough for 7 or 8 months  I did review his notes  It appears that he was seen by his primary care physician in April 2018  He had a viral illness at that time and was given Mucinex as well as Flonase nasal spray P  He did report to the emergency room in April of 2018 for persistent cough  He had a dry cough and postnasal drip  He also complains of postnasal drip  During that ER visit he was given prednisone 50 mg daily for 5 days and promethazine with codeine  He also was given Flonase nasal spray  Electronically Signed by WILLIE Chilel    ______________________________________________________________________    Chief Complaint:   Chief Complaint   Patient presents with    Cough     productive has occured for the last 10 mos     Shortness of Breath     occurs when eating or drinking    Wheezing    Sleeping Problem     pt reports sleeping for 3-4 hrs per night/ snores at night        Patient ID: Mark Rosenbaum is a 53 y o  y o  male has a past medical history of Abnormal weight gain, Hemorrhoids, History of gastroesophageal reflux (GERD), and Hyperlipidemia  10/1/2019  Patient presents today for initial visit  Cough   This is a chronic problem  The current episode started more than 1 year ago  The problem has been unchanged  The cough is productive of sputum  Associated symptoms include shortness of breath and wheezing   The symptoms are aggravated by lying down and pollens  He has tried a beta-agonist inhaler for the symptoms  The treatment provided no relief  His past medical history is significant for environmental allergies  Shortness of Breath   Associated symptoms include wheezing  Wheezing    Associated symptoms include coughing and shortness of breath  Occupational/Exposure history:  Pets/Enviroment:  Travel history:  Review of Systems   Constitutional: Negative  HENT: Negative  Eyes: Negative  Respiratory: Positive for cough, shortness of breath and wheezing  Cardiovascular: Negative  Gastrointestinal: Negative  Endocrine: Negative  Genitourinary: Negative  Musculoskeletal: Negative  Skin: Negative  Allergic/Immunologic: Positive for environmental allergies  Hematological: Negative  Psychiatric/Behavioral: Negative  Social history: He reports that he has never smoked  He has never used smokeless tobacco  He reports that he does not drink alcohol or use drugs  Past surgical history: History reviewed  No pertinent surgical history  Family history:   Family History   Problem Relation Age of Onset    No Known Problems Mother     No Known Problems Father     COPD Maternal Uncle          There is no immunization history on file for this patient  Current Outpatient Medications   Medication Sig Dispense Refill    albuterol (PROVENTIL HFA,VENTOLIN HFA) 90 mcg/act inhaler Inhale 2 puffs every 4 (four) hours as needed for wheezing 1 Inhaler 0    dextromethorphan-guaifenesin (MUCINEX DM)  MG per 12 hr tablet Take 1 tablet by mouth every 12 (twelve) hours 30 tablet 0    fluticasone (FLONASE) 50 mcg/act nasal spray 1 spray into each nostril daily 16 g 0    fluticasone (ARNUITY ELLIPTA) 200 MCG/ACT AEPB inhaler Inhale 1 puff (200 mcg total) daily Rinse mouth after use  1 Inhaler 3     No current facility-administered medications for this visit        Allergies: Patient has no known allergies  Objective:  Vitals:    10/01/19 1551   BP: 122/82   BP Location: Left arm   Patient Position: Sitting   Cuff Size: Standard   Pulse: 77   Resp: 12   Temp: 98 4 °F (36 9 °C)   TempSrc: Tympanic   SpO2: 98%   Weight: 87 5 kg (193 lb)   Height: 5' 8 25" (1 734 m)   Oxygen Therapy  SpO2: 98 %    Wt Readings from Last 3 Encounters:   10/01/19 87 5 kg (193 lb)   07/29/19 86 4 kg (190 lb 8 oz)   07/24/19 88 kg (194 lb)     Body mass index is 29 13 kg/m²  Physical Exam   Constitutional: He is oriented to person, place, and time  He appears well-developed and well-nourished  HENT:   Head: Normocephalic  Mouth/Throat: Oropharynx is clear and moist    Mallampati 4   Eyes: Pupils are equal, round, and reactive to light  EOM are normal    Neck: Neck supple  Cardiovascular: Normal rate  Pulmonary/Chest: Effort normal and breath sounds normal    Abdominal: Soft  Musculoskeletal: Normal range of motion  Right lower leg: Normal         Left lower leg: Normal    Neurological: He is alert and oriented to person, place, and time  Skin: Skin is warm  Capillary refill takes less than 2 seconds  Psychiatric: He has a normal mood and affect   His behavior is normal        Lab Review:   Orders Only on 09/21/2019   Component Date Value    Glucose, Random 09/21/2019 102*    BUN 09/21/2019 16     Creatinine 09/21/2019 1 04     eGFR Non  09/21/2019 80     eGFR  09/21/2019 93     SL AMB BUN/CREATININE RA* 09/21/2019 15     Sodium 09/21/2019 139     Potassium 09/21/2019 4 7     Chloride 09/21/2019 101     CO2 09/21/2019 24     CALCIUM 09/21/2019 9 8     Protein, Total 09/21/2019 7 2     Albumin 09/21/2019 4 6     Globulin, Total 09/21/2019 2 6     Albumin/Globulin Ratio 09/21/2019 1 8     TOTAL BILIRUBIN 09/21/2019 1 0     Alk Phos Isoenzymes 09/21/2019 94     AST 09/21/2019 27     ALT 09/21/2019 47*    Cholesterol, Total 09/21/2019 231*    Triglycerides 09/21/2019 164*    HDL 09/21/2019 34*    LDL Direct 09/21/2019 164*       Diagnostics:  I have personally reviewed pertinent reports  Office Spirometry Results:  FEV1: 3 51 liters(99%)  FVC: 4 21 liters(93%)  FEV1/FVC: 83 4 %  ESS: Total score: 15  No results found

## 2019-10-01 NOTE — PATIENT INSTRUCTIONS
Sleep Apnea   AMBULATORY CARE:   Sleep apnea  is also called obstructive sleep apnea  It is a condition that causes you to stop breathing for 10 seconds or more while you are sleeping  During normal sleep, your throat is kept open by muscles that let air pass through easily  Sleep apnea causes the muscles and tissues around your throat to relax and block air from passing through  Sleep apnea may happen many times while you are asleep  Common symptoms include the following:   · No signs of breathing for 10 seconds or more while you sleep    · Snoring loudly, snorting, gasping or choking while you sleep, and waking up suddenly because of these    · A hard time thinking, remembering things, or focusing on your tasks the following day    · Headache or nausea    · Bedwetting or waking up often during the night to urinate    · Feeling sleepy, slow, and tired during the day  Seek care immediately if:   · You have chest pain or trouble breathing  Contact your healthcare provider if:   · You feel tired or depressed  · You have trouble staying awake during the day  · You have trouble thinking clearly  · You have questions or concerns about your condition or care  Treatment for sleep apnea  includes using a continuous positive airway pressure (CPAP) machine to keep your airway open during sleep  A mask is placed over your nose and mouth, or just your nose  The mask is hooked to the CPAP machine that blows a gentle stream of air into the mask when you breathe  This helps keep your airway open so you can breathe more regularly  Extra oxygen may be given to you through the machine  You may be given a mouth device  It looks like a mouth guard or dental retainer and stops your tongue and mouth tissues from blocking your throat while you sleep  Surgery may be needed to remove extra tissues that block your mouth, throat, or nose  Manage sleep apnea:   · Do not smoke    Nicotine and other chemicals in cigarettes and cigars can cause lung damage  Ask your healthcare provider for information if you currently smoke and need help to quit  E-cigarettes or smokeless tobacco still contain nicotine  Talk to your healthcare provider before you use these products  · Do not drink alcohol or take sedative medicine before you go to sleep  Alcohol and sedatives can relax the muscles and tissues around your throat  This can block the airflow to your lungs  · Maintain a healthy weight  Excess tissue around your throat may restrict your breathing  Ask your healthcare provider for information if you need to lose weight  · Sleep on your side or use pillows designed to prevent sleep apnea  This prevents your tongue or other tissues from blocking your throat  You can also raise the head of your bed  Follow up with your healthcare provider as directed:  Write down your questions so you remember to ask them during your visits  © 2017 [employer] Information is for End User's use only and may not be sold, redistributed or otherwise used for commercial purposes  All illustrations and images included in CareNotes® are the copyrighted property of A D [employer]  or Thanh Payne  The above information is an  only  It is not intended as medical advice for individual conditions or treatments  Talk to your doctor, nurse or pharmacist before following any medical regimen to see if it is safe and effective for you

## 2019-10-01 NOTE — ASSESSMENT & PLAN NOTE
Patient has an [place] Sleepiness Scale of 15 and it and neck circumference of 17 25 inches  According to patient he is very tired during  The day and snores heavily  He likely is at high risk for this diagnosis  It appears that he has a previous history and was given a CPAP many many years ago when he lived in [address] [place] [address] Dr  He no longer has a CPAP machine  He is agreeable to have a home sleep study

## 2019-11-22 ENCOUNTER — HOSPITAL ENCOUNTER (OUTPATIENT)
Dept: SLEEP CENTER | Facility: CLINIC | Age: 54
Discharge: HOME | End: 2019-11-22
Payer: COMMERCIAL

## 2019-11-22 DIAGNOSIS — G47.33 OSA (OBSTRUCTIVE SLEEP APNEA): ICD-10-CM

## 2019-11-22 DIAGNOSIS — G47.19 DAYTIME HYPERSOMNOLENCE: ICD-10-CM

## 2019-11-22 PROCEDURE — 95811 POLYSOM 6/>YRS CPAP 4/> PARM: CPT | Performed by: INTERNAL MEDICINE

## 2019-11-22 PROCEDURE — 95811 POLYSOM 6/>YRS CPAP 4/> PARM: CPT

## 2019-11-23 ENCOUNTER — TRANSCRIBE ORDERS (OUTPATIENT)
Dept: SLEEP CENTER | Facility: CLINIC | Age: 54
End: 2019-11-23

## 2019-11-23 DIAGNOSIS — G47.33 OSA (OBSTRUCTIVE SLEEP APNEA): Primary | ICD-10-CM

## 2019-11-23 NOTE — PROGRESS NOTES
Sleep Study Documentation    Pre-Sleep Study       Sleep testing procedure explained to patient:YES    Patient napped prior to study:YES- less than 30 minutes  Napped after 2PM: no    Caffeine:Dayshift worker after 12PM   Caffeine use:YES- soda  12 ounces    Alcohol:Dayshift workers after 5PM: Alcohol use:NO    Typical day for patient:YES       Study Documentation    Sleep Study Indications:    Sleep Study: Split Optimal PAP pressure: 11cm  Leak:Small  Snore:Eliminated  REM Obtained:yes  Supplemental O2: no    Minimum SaO2 85%  Baseline SaO2 95 5%  PAP mask tried (list all)Respironics Dreamwear, Coy and Pykel Simplus  PAP mask choice (final)Coy and Pykel Simplus  PAP mask type:full face  PAP pressure at which snoring was eliminated 8cm  Minimum SaO2 at final PAP pressure 90%  Mode of Therapy:CPAP  ETCO2:No  CPAP changed to BiPAP:No    Mode of Therapy:CPAP    EKG abnormalities: yes:  EPOCH example and comments: PVCs    EEG abnormalities: no    Sleep Study Recorded < 2 hours: N/A    Sleep Study Recorded > 2 hours but incomplete study: N/A    Sleep Study Recorded 6 hours but no sleep obtained: NO    Patient classification: employed       Post-Sleep Study    Medication used at bedtime or during sleep study:NO    Patient reports time it took to fall asleep:greater than 60 minutes    Patient reports waking up during study:3 or more times  Patient reports returning to sleep in 10 to 30 minutes  Patient reports sleeping 2 to 4 hours with dreaming  Patient reports sleep during study:better than usual    Patient rated sleepiness: Somewhat sleepy or tired    PAP treatment:yes: Post PAP treatment patient reports feeling better and  would wear PAP mask at home

## 2019-12-02 NOTE — PROGRESS NOTES
Left message with patient  Asked that he call the office  I reviewed his split night study  He has diagnosis of severe MARIO and was titrated to CPAP of 11   I will await his phone call so to order his machine and review plan of care

## 2019-12-12 ENCOUNTER — OFFICE VISIT (OUTPATIENT)
Dept: FAMILY MEDICINE CLINIC | Facility: CLINIC | Age: 54
End: 2019-12-12
Payer: COMMERCIAL

## 2019-12-12 VITALS
BODY MASS INDEX: 29.25 KG/M2 | TEMPERATURE: 98.1 F | HEART RATE: 84 BPM | WEIGHT: 193 LBS | OXYGEN SATURATION: 97 % | HEIGHT: 68 IN

## 2019-12-12 DIAGNOSIS — H00.011 HORDEOLUM EXTERNUM OF RIGHT UPPER EYELID: Primary | ICD-10-CM

## 2019-12-12 PROCEDURE — 99213 OFFICE O/P EST LOW 20 MIN: CPT | Performed by: FAMILY MEDICINE

## 2019-12-12 PROCEDURE — 3008F BODY MASS INDEX DOCD: CPT | Performed by: FAMILY MEDICINE

## 2019-12-13 NOTE — PROGRESS NOTES
Assessment/Plan:    Diagnoses and all orders for this visit:    Hordeolum externum of right upper eyelid      Patient advised that this can occur on its own and will resolve on its own  He can help it to by using warm wet wash cloths and milking the eyelid to the lateral opening  No active drainage today, but likely will occur tomorrow due to presentation today  He can call the office with any questions or concerns  If it does not resolved by next week he should return to clinic  Subjective:   Patient ID: Robel Mchugh is a 53 y o  male  HPI  Patient here tonight for 2 days of right upper eyelid swelling and pain  Reports nothing specifically brought this on, no injury or trauma to the area  Does not get this frequently  It is not draining or open, but very tender to palpation  He was to know what else can be done for it  Sclera are normal appearing and have not been irritated or red at all  No tearing of the eyes  No other URI symptoms, cough, fever, chills, or runny nose  Review of Systems   Constitutional: Negative for chills and fever  Eyes: Positive for itching  Negative for discharge and redness  Eyelid redness and swelling   Respiratory: Negative for cough and shortness of breath  Gastrointestinal: Negative for nausea and vomiting  Skin: Positive for color change (Upper eyelid)  Negative for rash  Objective:  Vitals:    12/12/19 1857   Pulse: 84   Temp: 98 1 °F (36 7 °C)   SpO2: 97%      Physical Exam   Constitutional: He is oriented to person, place, and time  He appears well-developed and well-nourished  HENT:   Head: Normocephalic and atraumatic  Eyes: Conjunctivae and EOM are normal  Right eye exhibits no discharge  Left eye exhibits no discharge  No scleral icterus  Patient has right upper eyelid swelling, erythema, and tenderness to palpation  This is diffuse to the entire upper eyelid and not focal in to 1 small Syc[place]    Appears to have pinpoint opening on lateral edge  Neck: Normal range of motion  Cardiovascular: Normal rate and intact distal pulses  Pulmonary/Chest: Effort normal  No stridor  No respiratory distress  Neurological: He is alert and oriented to person, place, and time  Skin: Skin is warm and dry  There is erythema (See eyes portion)  Psychiatric: He has a normal mood and affect  His behavior is normal    Vitals reviewed  Portions of the record may have been created with voice recognition software  Occasional wrong word or "sound alike" substitutions may have occurred due to the inherent limitations of voice recognition software  Please review the chart carefully and recognize, using context, where substitutions/typographical errors may have occurred

## 2019-12-16 ENCOUNTER — TELEPHONE (OUTPATIENT)
Dept: PULMONOLOGY | Facility: MEDICAL CENTER | Age: 54
End: 2019-12-16

## 2019-12-16 NOTE — TELEPHONE ENCOUNTER
I was able to speak to patient regarding results of the sleep study  He is Iranian-speaking and reports that he will call back with an

## 2020-01-07 ENCOUNTER — TELEPHONE (OUTPATIENT)
Dept: PULMONOLOGY | Facility: MEDICAL CENTER | Age: 55
End: 2020-01-07

## 2020-01-07 DIAGNOSIS — G47.33 OSA (OBSTRUCTIVE SLEEP APNEA): Primary | ICD-10-CM

## 2020-01-07 NOTE — PROGRESS NOTES
I spoke with patient in regard to his split night study  He had a planned appointment tomorrow  I had attempted to contact him several times in regard to the split report  Patient is aware that he has severe obstructive sleep apnea  He was titrated to auto CPAP pressure of 11-15 cm of water pressure  Mean oxygen saturation for titration portion of study was 97% with jacy of 94  Patient is aware that he will need a compliance visit approximately 45 days after delivery of his auto CPAP  I will send a reminder to the medical assistance that he does comply  All questions were answered  He has decided to not come in for his appointment tomorrow as details of split night study was discussed with patient via telephone

## 2020-02-04 PROBLEM — G47.33 OBSTRUCTIVE SLEEP APNEA: Status: ACTIVE | Noted: 2020-02-04

## 2020-02-05 ENCOUNTER — OFFICE VISIT (OUTPATIENT)
Dept: GASTROENTEROLOGY | Facility: CLINIC | Age: 55
End: 2020-02-05
Payer: COMMERCIAL

## 2020-02-05 VITALS
WEIGHT: 201.6 LBS | TEMPERATURE: 97.2 F | HEART RATE: 80 BPM | RESPIRATION RATE: 18 BRPM | BODY MASS INDEX: 30.55 KG/M2 | SYSTOLIC BLOOD PRESSURE: 118 MMHG | HEIGHT: 68 IN | DIASTOLIC BLOOD PRESSURE: 80 MMHG

## 2020-02-05 DIAGNOSIS — R74.01 ELEVATED ALT MEASUREMENT: ICD-10-CM

## 2020-02-05 DIAGNOSIS — K21.9 GASTROESOPHAGEAL REFLUX DISEASE, ESOPHAGITIS PRESENCE NOT SPECIFIED: Primary | ICD-10-CM

## 2020-02-05 DIAGNOSIS — Z12.11 COLON CANCER SCREENING: ICD-10-CM

## 2020-02-05 PROCEDURE — 99244 OFF/OP CNSLTJ NEW/EST MOD 40: CPT | Performed by: INTERNAL MEDICINE

## 2020-02-05 RX ORDER — OMEPRAZOLE 40 MG/1
40 CAPSULE, DELAYED RELEASE ORAL DAILY
Qty: 30 CAPSULE | Refills: 3 | Status: SHIPPED | OUTPATIENT
Start: 2020-02-05

## 2020-02-05 RX ORDER — OMEPRAZOLE 40 MG/1
40 CAPSULE, DELAYED RELEASE ORAL DAILY
Qty: 30 CAPSULE | Refills: 3 | Status: SHIPPED | OUTPATIENT
Start: 2020-02-05 | End: 2020-02-05 | Stop reason: SDUPTHER

## 2020-02-05 NOTE — H&P (VIEW-ONLY)
Consultation - [facility]) [facility]  Saud Urbina 53 y o  male MRN: [id number]  Unit/Bed#:  Encounter: [id number]        Consults    ASSESSMENT/PLAN:     1  Colon cancer screening-increased risk secondary to personal history of colon polyps  His previous colonoscopy was over 3 years ago, patient reports numerous polyps  -will schedule repeat colonoscopy at this time  Patient was explained about  the risks and benefits of the procedure  Risks including but not limited to bleeding, infection, perforation were explained in detail  Also explained about less than 100% sensitivity with the exam and other alternatives  -will obtain results of previous colonoscopy  2  GERD-symptoms likely aggravated in setting of hiatal hernia or decreased LES pressure  He has had several bouts of black color stools although I suspect this is likely from Pepto-Bismol use  Nonetheless, he needs to have repeat CBC at this time  Also check TSH   -start on omeprazole 40 mg to be taken 30 minutes before dinner as symptoms are predominantly at nighttime  Patient was explained about the lifestyle and dietary modifications  Advised to avoid fatty foods, chocolates, caffeine, alcohol and any other triggering foods  Avoid eating for at least 3 hours before going to bed  -will plan for repeat EGD to assess for Scruggs's given longstanding symptoms of acid reflux   -avoid NSAIDs  3  Mild elevation of transaminitis with ALT of 47, AST is 27  Remainder of the liver function tests are completely normal   -would recommend weight loss, patient BMI is 30  This may be secondary to nonalcoholic fatty liver disease   -repeat LFTs again       ______________________________________________________________________    Reason for Consult / Principal Problem: [unfilled]    HPI: Saud Urbina is a 53y o  year old male with history of GERD, hyperlipidemia, hemorrhoids, presents for colon cancer screening evaluation  Patient reports that he has had symptoms of acid reflux for many years, he states that he was previously on AcipHex which controlled the symptoms  He states that he has not been evaluated by GI in the past few years and has not been on any PPI  He states that he has tried over-the-counter Nexium however has not had any relief  He states that acid reflux symptoms are predominantly at nighttime  Denies any dysphagia, hematemesis, coffee-ground emesis but does report several occasions of black colored stools  Patient does report occasional use of Pepto-Bismol as well  No recent CBC in the chart  He reports that he underwent EGD and colonoscopy at the same time 3 years ago by Dr Damon Koroma  He reports that he has had polyps in the past, was told that he had 10 polyps at a time  He states that he received a letter from Dr Damon Koroma stating as he was a due for repeat colonoscopy  Does not report any NSAID use on regular basis  No change in bowel habits, loss of appetite or unintentional weight loss  Most recent liver function tests are notable for mild elevation of ALT of 47, AST is normal   No recent CBC in the chart  Review of Systems: The remainder of the review of systems was negative except for the pertinent positives noted in HPI  Historical Information   Past Medical History:   Diagnosis Date    Abnormal weight gain     Last assessed 3/24/2014    Hemorrhoids     History of gastroesophageal reflux (GERD)     Hyperlipidemia     Last assessed 3/24/2014      No past surgical history on file    Social History   Social History     Substance and Sexual Activity   Alcohol Use No     Social History     Substance and Sexual Activity   Drug Use No     Social History     Tobacco Use   Smoking Status Never Smoker   Smokeless Tobacco Never Used     Family History   Problem Relation Age of Onset    No Known Problems Mother     No Known Problems Father     COPD Maternal Uncle        Meds/Allergies (Not in a hospital admission)  No current facility-administered medications for this visit  No Known Allergies    Objective     Blood pressure 118/80, pulse 80, temperature (!) 97 2 °F (36 2 °C), resp  rate 18, height 5' 8" (1 727 m), weight 91 4 kg (201 lb 9 6 oz)  [unfilled]    PHYSICAL EXAM     GEN: well nourished, well developed, no acute distress  HEENT: anicteric, MMM, no cervical or supraclavicular lymphadenopathy  CV: RRR, no m/r/g  CHEST: CTA b/l, no WRR  ABD: +BS, soft, NT/ND, no hepatosplenomegaly  EXT: no c/c/e  SKIN: no rashes,  NEURO: aaox3    Lab Results:   No visits with results within 1 Day(s) from this visit     Latest known visit with results is:   Orders Only on 09/21/2019   Component Date Value    Glucose, Random 09/21/2019 102*    BUN 09/21/2019 16     Creatinine 09/21/2019 1 04     eGFR Non  09/21/2019 80     eGFR  09/21/2019 93     SL AMB BUN/CREATININE RA* 09/21/2019 15     Sodium 09/21/2019 139     Potassium 09/21/2019 4 7     Chloride 09/21/2019 101     CO2 09/21/2019 24     CALCIUM 09/21/2019 9 8     Protein, Total 09/21/2019 7 2     Albumin 09/21/2019 4 6     Globulin, Total 09/21/2019 2 6     Albumin/Globulin Ratio 09/21/2019 1 8     TOTAL BILIRUBIN 09/21/2019 1 0     Alk Phos Isoenzymes 09/21/2019 94     AST 09/21/2019 27     ALT 09/21/2019 47*    Cholesterol, Total 09/21/2019 231*    Triglycerides 09/21/2019 164*    HDL 09/21/2019 34*    LDL Direct 09/21/2019 164*     Imaging Studies: I have personally reviewed pertinent films in PACS

## 2020-02-05 NOTE — PROGRESS NOTES
Consultation - [facility]  Arminda Lay 53 y o  male MRN: [id number]  Unit/Bed#:  Encounter: [id number]        Consults    ASSESSMENT/PLAN:     1  Colon cancer screening-increased risk secondary to personal history of colon polyps  His previous colonoscopy was over 3 years ago, patient reports numerous polyps  -will schedule repeat colonoscopy at this time  Patient was explained about  the risks and benefits of the procedure  Risks including but not limited to bleeding, infection, perforation were explained in detail  Also explained about less than 100% sensitivity with the exam and other alternatives  -will obtain results of previous colonoscopy  2  GERD-symptoms likely aggravated in setting of hiatal hernia or decreased LES pressure  He has had several bouts of black color stools although I suspect this is likely from Pepto-Bismol use  Nonetheless, he needs to have repeat CBC at this time  Also check TSH   -start on omeprazole 40 mg to be taken 30 minutes before dinner as symptoms are predominantly at nighttime  Patient was explained about the lifestyle and dietary modifications  Advised to avoid fatty foods, chocolates, caffeine, alcohol and any other triggering foods  Avoid eating for at least 3 hours before going to bed  -will plan for repeat EGD to assess for Scruggs's given longstanding symptoms of acid reflux   -avoid NSAIDs  3  Mild elevation of transaminitis with ALT of 47, AST is 27  Remainder of the liver function tests are completely normal   -would recommend weight loss, patient BMI is 30  This may be secondary to nonalcoholic fatty liver disease   -repeat LFTs again       ______________________________________________________________________    Reason for Consult / Principal Problem: [unfilled]    HPI: Arminda Lay is a 53y o  year old male with history of GERD, hyperlipidemia, hemorrhoids, presents for colon cancer screening evaluation  Patient reports that he has had symptoms of acid reflux for many years, he states that he was previously on AcipHex which controlled the symptoms  He states that he has not been evaluated by GI in the past few years and has not been on any PPI  He states that he has tried over-the-counter Nexium however has not had any relief  He states that acid reflux symptoms are predominantly at nighttime  Denies any dysphagia, hematemesis, coffee-ground emesis but does report several occasions of black colored stools  Patient does report occasional use of Pepto-Bismol as well  No recent CBC in the chart  He reports that he underwent EGD and colonoscopy at the same time 3 years ago by Dr Amaya Owusu  He reports that he has had polyps in the past, was told that he had 10 polyps at a time  He states that he received a letter from Dr Amaya Owusu stating as he was a due for repeat colonoscopy  Does not report any NSAID use on regular basis  No change in bowel habits, loss of appetite or unintentional weight loss  Most recent liver function tests are notable for mild elevation of ALT of 47, AST is normal   No recent CBC in the chart  Review of Systems: The remainder of the review of systems was negative except for the pertinent positives noted in HPI  Historical Information   Past Medical History:   Diagnosis Date    Abnormal weight gain     Last assessed 3/24/2014    Hemorrhoids     History of gastroesophageal reflux (GERD)     Hyperlipidemia     Last assessed 3/24/2014      No past surgical history on file    Social History   Social History     Substance and Sexual Activity   Alcohol Use No     Social History     Substance and Sexual Activity   Drug Use No     Social History     Tobacco Use   Smoking Status Never Smoker   Smokeless Tobacco Never Used     Family History   Problem Relation Age of Onset    No Known Problems Mother     No Known Problems Father     COPD Maternal Uncle        Meds/Allergies (Not in a hospital admission)  No current facility-administered medications for this visit  No Known Allergies    Objective     Blood pressure 118/80, pulse 80, temperature (!) 97 2 °F (36 2 °C), resp  rate 18, height 5' 8" (1 727 m), weight 91 4 kg (201 lb 9 6 oz)  [unfilled]    PHYSICAL EXAM     GEN: well nourished, well developed, no acute distress  HEENT: anicteric, MMM, no cervical or supraclavicular lymphadenopathy  CV: RRR, no m/r/g  CHEST: CTA b/l, no WRR  ABD: +BS, soft, NT/ND, no hepatosplenomegaly  EXT: no c/c/e  SKIN: no rashes,  NEURO: aaox3    Lab Results:   No visits with results within 1 Day(s) from this visit     Latest known visit with results is:   Orders Only on 09/21/2019   Component Date Value    Glucose, Random 09/21/2019 102*    BUN 09/21/2019 16     Creatinine 09/21/2019 1 04     eGFR Non  09/21/2019 80     eGFR  09/21/2019 93     SL AMB BUN/CREATININE RA* 09/21/2019 15     Sodium 09/21/2019 139     Potassium 09/21/2019 4 7     Chloride 09/21/2019 101     CO2 09/21/2019 24     CALCIUM 09/21/2019 9 8     Protein, Total 09/21/2019 7 2     Albumin 09/21/2019 4 6     Globulin, Total 09/21/2019 2 6     Albumin/Globulin Ratio 09/21/2019 1 8     TOTAL BILIRUBIN 09/21/2019 1 0     Alk Phos Isoenzymes 09/21/2019 94     AST 09/21/2019 27     ALT 09/21/2019 47*    Cholesterol, Total 09/21/2019 231*    Triglycerides 09/21/2019 164*    HDL 09/21/2019 34*    LDL Direct 09/21/2019 164*     Imaging Studies: I have personally reviewed pertinent films in PACS

## 2020-02-11 ENCOUNTER — APPOINTMENT (OUTPATIENT)
Dept: LAB | Facility: HOSPITAL | Age: 55
End: 2020-02-11
Attending: INTERNAL MEDICINE
Payer: COMMERCIAL

## 2020-02-11 ENCOUNTER — TRANSCRIBE ORDERS (OUTPATIENT)
Dept: ADMINISTRATIVE | Facility: HOSPITAL | Age: 55
End: 2020-02-11

## 2020-02-11 DIAGNOSIS — R79.89 ELEVATED LFTS: Primary | ICD-10-CM

## 2020-02-11 DIAGNOSIS — Z12.11 COLON CANCER SCREENING: ICD-10-CM

## 2020-02-11 DIAGNOSIS — R74.01 ELEVATED ALT MEASUREMENT: ICD-10-CM

## 2020-02-11 DIAGNOSIS — K21.9 GASTROESOPHAGEAL REFLUX DISEASE, ESOPHAGITIS PRESENCE NOT SPECIFIED: ICD-10-CM

## 2020-02-11 LAB
ALBUMIN SERPL BCP-MCNC: 4.2 G/DL (ref 3.5–5)
ALP SERPL-CCNC: 86 U/L (ref 46–116)
ALT SERPL W P-5'-P-CCNC: 93 U/L (ref 12–78)
AST SERPL W P-5'-P-CCNC: 35 U/L (ref 5–45)
BILIRUB DIRECT SERPL-MCNC: 0.19 MG/DL (ref 0–0.2)
BILIRUB SERPL-MCNC: 1.1 MG/DL (ref 0.2–1)
ERYTHROCYTE [DISTWIDTH] IN BLOOD BY AUTOMATED COUNT: 11.6 % (ref 11.6–15.1)
HBV CORE AB SER QL: NORMAL
HBV CORE IGM SER QL: NORMAL
HBV SURFACE AG SER QL: NORMAL
HCT VFR BLD AUTO: 46.3 % (ref 36.5–49.3)
HCV AB SER QL: NORMAL
HGB BLD-MCNC: 15.4 G/DL (ref 12–17)
MCH RBC QN AUTO: 29.7 PG (ref 26.8–34.3)
MCHC RBC AUTO-ENTMCNC: 33.3 G/DL (ref 31.4–37.4)
MCV RBC AUTO: 89 FL (ref 82–98)
PLATELET # BLD AUTO: 204 THOUSANDS/UL (ref 149–390)
PMV BLD AUTO: 12.4 FL (ref 8.9–12.7)
PROT SERPL-MCNC: 7.7 G/DL (ref 6.4–8.2)
RBC # BLD AUTO: 5.19 MILLION/UL (ref 3.88–5.62)
TSH SERPL DL<=0.05 MIU/L-ACNC: 2.58 UIU/ML (ref 0.36–3.74)
WBC # BLD AUTO: 7.08 THOUSAND/UL (ref 4.31–10.16)

## 2020-02-11 PROCEDURE — 87340 HEPATITIS B SURFACE AG IA: CPT

## 2020-02-11 PROCEDURE — 36415 COLL VENOUS BLD VENIPUNCTURE: CPT

## 2020-02-11 PROCEDURE — 86803 HEPATITIS C AB TEST: CPT

## 2020-02-11 PROCEDURE — 84443 ASSAY THYROID STIM HORMONE: CPT

## 2020-02-11 PROCEDURE — 85027 COMPLETE CBC AUTOMATED: CPT

## 2020-02-11 PROCEDURE — 86704 HEP B CORE ANTIBODY TOTAL: CPT

## 2020-02-11 PROCEDURE — 80076 HEPATIC FUNCTION PANEL: CPT

## 2020-02-11 PROCEDURE — 86705 HEP B CORE ANTIBODY IGM: CPT

## 2020-02-12 ENCOUNTER — HOSPITAL ENCOUNTER (OUTPATIENT)
Dept: RADIOLOGY | Facility: HOSPITAL | Age: 55
Discharge: HOME | End: 2020-02-12
Attending: OTOLARYNGOLOGY
Payer: COMMERCIAL

## 2020-02-12 ENCOUNTER — TELEPHONE (OUTPATIENT)
Dept: GASTROENTEROLOGY | Facility: CLINIC | Age: 55
End: 2020-02-12

## 2020-02-12 DIAGNOSIS — J01.91 ACUTE RECURRENT SINUSITIS, UNSPECIFIED LOCATION: ICD-10-CM

## 2020-02-12 PROCEDURE — 70486 CT MAXILLOFACIAL W/O DYE: CPT

## 2020-02-12 NOTE — TELEPHONE ENCOUNTER
Called and relayed results to patient along with relaying the number to central scheduling to get this lab done     Patient stated he will call back when all this is done to make the follow up apt

## 2020-02-12 NOTE — TELEPHONE ENCOUNTER
----- Message from Cole Sneed MD sent at 2/11/2020 10:06 AM EST -----  Please inform the patient that his liver functions are elevated, I will order additional blood work but I think this may be from fatty liver disease  I will also need him to undergo right upper quadrant ultrasound for further evaluation    Followup in the office after the procedures

## 2020-02-13 ENCOUNTER — TELEPHONE (OUTPATIENT)
Dept: INFECTIOUS DISEASES | Facility: CLINIC | Age: 55
End: 2020-02-13

## 2020-02-14 ENCOUNTER — HOSPITAL ENCOUNTER (OUTPATIENT)
Dept: RADIOLOGY | Facility: HOSPITAL | Age: 55
Discharge: HOME | End: 2020-02-14
Attending: INTERNAL MEDICINE
Payer: COMMERCIAL

## 2020-02-14 DIAGNOSIS — R79.89 ELEVATED LFTS: ICD-10-CM

## 2020-02-14 PROCEDURE — 76705 ECHO EXAM OF ABDOMEN: CPT

## 2020-02-19 ENCOUNTER — ANESTHESIA EVENT (OUTPATIENT)
Dept: GASTROENTEROLOGY | Facility: AMBULARY SURGERY CENTER | Age: 55
End: 2020-02-19

## 2020-02-19 RX ORDER — SODIUM CHLORIDE, SODIUM LACTATE, POTASSIUM CHLORIDE, CALCIUM CHLORIDE 600; 310; 30; 20 MG/100ML; MG/100ML; MG/100ML; MG/100ML
75 INJECTION, SOLUTION INTRAVENOUS CONTINUOUS
Status: CANCELLED | OUTPATIENT
Start: 2020-02-19

## 2020-02-19 NOTE — ANESTHESIA PREPROCEDURE EVALUATION
Review of Systems/Medical History  Patient summary reviewed  Chart reviewed  No history of anesthetic complications     Cardiovascular  Hyperlipidemia,    Pulmonary  No asthma , Sleep apnea ,        GI/Hepatic    GERD ,             Endo/Other     GYN       Hematology   Musculoskeletal       Neurology   Psychology           Physical Exam    Airway    Mallampati score: III  TM Distance: >3 FB  Neck ROM: full     Dental       Cardiovascular  Rhythm: regular, Rate: normal,     Pulmonary  Breath sounds clear to auscultation,     Other Findings        Anesthesia Plan  ASA Score- 2     Anesthesia Type- IV sedation with anesthesia with ASA Monitors  Additional Monitors:   Airway Plan:         Plan Factors-    Induction- intravenous  Postoperative Plan-     Informed Consent- Anesthetic plan and risks discussed with patient  I personally reviewed this patient with the CRNA  Discussed and agreed on the Anesthesia Plan with the CRNA  Garett Green

## 2020-02-19 NOTE — PRE-PROCEDURE INSTRUCTIONS
No outpatient medications have been marked as taking for the 2/20/20 encounter [facility] Encounter) with [facility]  Pt has Rx's in the profile but not taking any currently as there is an insurance issue re: coverage of meds ordered

## 2020-02-20 ENCOUNTER — HOSPITAL ENCOUNTER (OUTPATIENT)
Dept: GASTROENTEROLOGY | Facility: AMBULARY SURGERY CENTER | Age: 55
Setting detail: OUTPATIENT SURGERY
Discharge: HOME | End: 2020-02-20
Attending: INTERNAL MEDICINE | Admitting: INTERNAL MEDICINE
Payer: COMMERCIAL

## 2020-02-20 ENCOUNTER — ANESTHESIA (OUTPATIENT)
Dept: GASTROENTEROLOGY | Facility: AMBULARY SURGERY CENTER | Age: 55
End: 2020-02-20

## 2020-02-20 VITALS
DIASTOLIC BLOOD PRESSURE: 85 MMHG | RESPIRATION RATE: 18 BRPM | OXYGEN SATURATION: 97 % | HEART RATE: 98 BPM | SYSTOLIC BLOOD PRESSURE: 126 MMHG | TEMPERATURE: 97.4 F

## 2020-02-20 DIAGNOSIS — K21.9 GASTROESOPHAGEAL REFLUX DISEASE, ESOPHAGITIS PRESENCE NOT SPECIFIED: ICD-10-CM

## 2020-02-20 DIAGNOSIS — R74.01 ELEVATED ALT MEASUREMENT: ICD-10-CM

## 2020-02-20 DIAGNOSIS — Z12.11 COLON CANCER SCREENING: ICD-10-CM

## 2020-02-20 PROCEDURE — 88305 TISSUE EXAM BY PATHOLOGIST: CPT | Performed by: PATHOLOGY

## 2020-02-20 PROCEDURE — 45385 COLONOSCOPY W/LESION REMOVAL: CPT | Performed by: INTERNAL MEDICINE

## 2020-02-20 PROCEDURE — 43239 EGD BIOPSY SINGLE/MULTIPLE: CPT | Performed by: INTERNAL MEDICINE

## 2020-02-20 PROCEDURE — 45380 COLONOSCOPY AND BIOPSY: CPT | Performed by: INTERNAL MEDICINE

## 2020-02-20 RX ORDER — SODIUM CHLORIDE, SODIUM LACTATE, POTASSIUM CHLORIDE, CALCIUM CHLORIDE 600; 310; 30; 20 MG/100ML; MG/100ML; MG/100ML; MG/100ML
INJECTION, SOLUTION INTRAVENOUS CONTINUOUS PRN
Status: DISCONTINUED | OUTPATIENT
Start: 2020-02-20 | End: 2020-02-20 | Stop reason: SURG

## 2020-02-20 RX ORDER — PROPOFOL 10 MG/ML
INJECTION, EMULSION INTRAVENOUS AS NEEDED
Status: DISCONTINUED | OUTPATIENT
Start: 2020-02-20 | End: 2020-02-20 | Stop reason: SURG

## 2020-02-20 RX ORDER — LIDOCAINE HYDROCHLORIDE 10 MG/ML
INJECTION, SOLUTION EPIDURAL; INFILTRATION; INTRACAUDAL; PERINEURAL AS NEEDED
Status: DISCONTINUED | OUTPATIENT
Start: 2020-02-20 | End: 2020-02-20 | Stop reason: SURG

## 2020-02-20 RX ORDER — PROPOFOL 10 MG/ML
INJECTION, EMULSION INTRAVENOUS CONTINUOUS PRN
Status: DISCONTINUED | OUTPATIENT
Start: 2020-02-20 | End: 2020-02-20 | Stop reason: SURG

## 2020-02-20 RX ADMIN — SODIUM CHLORIDE, SODIUM LACTATE, POTASSIUM CHLORIDE, AND CALCIUM CHLORIDE: .6; .31; .03; .02 INJECTION, SOLUTION INTRAVENOUS at 12:42

## 2020-02-20 RX ADMIN — LIDOCAINE HYDROCHLORIDE 50 MG: 10 INJECTION, SOLUTION EPIDURAL; INFILTRATION; INTRACAUDAL; PERINEURAL at 12:44

## 2020-02-20 RX ADMIN — PROPOFOL 120 MCG/KG/MIN: 10 INJECTION, EMULSION INTRAVENOUS at 12:46

## 2020-02-20 RX ADMIN — PROPOFOL 150 MG: 10 INJECTION, EMULSION INTRAVENOUS at 12:44

## 2020-02-20 NOTE — ANESTHESIA POSTPROCEDURE EVALUATION
Post-Op Assessment Note    CV Status:  Stable  Pain Score: 0    Pain management: adequate     Mental Status:  Alert and awake   Hydration Status:  Euvolemic   PONV Controlled:  Controlled   Airway Patency:  Patent   Post Op Vitals Reviewed: Yes      Staff: Anesthesiologist, CRNA   Comments: Report given to recovering RN, VSS   Pt resting comfortably          /80 (02/20/20 1321)    Temp     Pulse 86 (02/20/20 1321)   Resp 16 (02/20/20 1321)    SpO2 96 % (02/20/20 1321)

## 2020-02-20 NOTE — INTERVAL H&P NOTE
H&P reviewed  After examining the patient I find no changes in the patients condition since the H&P had been written      Vitals:    02/20/20 1131   BP: 124/86   Pulse: 83   Resp: 18   Temp: (!) 97 4 °F (36 3 °C)   SpO2: 96%

## 2020-02-21 DIAGNOSIS — K76.0 FATTY LIVER DISEASE, NONALCOHOLIC: Primary | ICD-10-CM

## 2020-02-25 ENCOUNTER — TELEPHONE (OUTPATIENT)
Dept: GASTROENTEROLOGY | Facility: AMBULARY SURGERY CENTER | Age: 55
End: 2020-02-25

## 2020-02-25 NOTE — TELEPHONE ENCOUNTER
----- Message from Taylor Beasley RN sent at 2/25/2020  3:39 PM EST -----      ----- Message -----  From: Poppy Cannon PA-C  Sent: 2/21/2020   4:42 PM EST  To: Geoffrey Rondon RN    Please advise, I have placed referral for nutrition services, thank you

## 2020-02-27 ENCOUNTER — APPOINTMENT (OUTPATIENT)
Dept: LAB | Facility: HOSPITAL | Age: 55
End: 2020-02-27
Attending: OTOLARYNGOLOGY
Payer: COMMERCIAL

## 2020-02-27 ENCOUNTER — TRANSCRIBE ORDERS (OUTPATIENT)
Dept: ADMINISTRATIVE | Facility: HOSPITAL | Age: 55
End: 2020-02-27

## 2020-02-27 DIAGNOSIS — E78.00 HYPERCHOLESTEREMIA: ICD-10-CM

## 2020-02-27 DIAGNOSIS — G47.33 OSA (OBSTRUCTIVE SLEEP APNEA): ICD-10-CM

## 2020-02-27 PROBLEM — Z87.19 HISTORY OF GASTROESOPHAGEAL REFLUX (GERD): Status: ACTIVE | Noted: 2020-02-27

## 2020-02-27 PROBLEM — J31.0 CHRONIC RHINITIS: Status: ACTIVE | Noted: 2020-02-27

## 2020-02-27 LAB
ANION GAP SERPL CALCULATED.3IONS-SCNC: 9 MMOL/L (ref 4–13)
BASOPHILS # BLD AUTO: 0.04 THOUSANDS/ΜL (ref 0–0.1)
BASOPHILS NFR BLD AUTO: 1 % (ref 0–1)
BUN SERPL-MCNC: 13 MG/DL (ref 5–25)
CALCIUM SERPL-MCNC: 8.7 MG/DL (ref 8.3–10.1)
CHLORIDE SERPL-SCNC: 104 MMOL/L (ref 100–108)
CHOLEST SERPL-MCNC: 206 MG/DL (ref 50–200)
CO2 SERPL-SCNC: 27 MMOL/L (ref 21–32)
CREAT SERPL-MCNC: 0.98 MG/DL (ref 0.6–1.3)
EOSINOPHIL # BLD AUTO: 0.12 THOUSAND/ΜL (ref 0–0.61)
EOSINOPHIL NFR BLD AUTO: 2 % (ref 0–6)
ERYTHROCYTE [DISTWIDTH] IN BLOOD BY AUTOMATED COUNT: 11.4 % (ref 11.6–15.1)
GFR SERPL CREATININE-BSD FRML MDRD: 86 ML/MIN/1.73SQ M
GLUCOSE P FAST SERPL-MCNC: 95 MG/DL (ref 65–99)
HCT VFR BLD AUTO: 44.6 % (ref 36.5–49.3)
HDLC SERPL-MCNC: 26 MG/DL
HGB BLD-MCNC: 14.8 G/DL (ref 12–17)
IMM GRANULOCYTES # BLD AUTO: 0.05 THOUSAND/UL (ref 0–0.2)
IMM GRANULOCYTES NFR BLD AUTO: 1 % (ref 0–2)
LDLC SERPL CALC-MCNC: 147 MG/DL (ref 0–100)
LYMPHOCYTES # BLD AUTO: 2.07 THOUSANDS/ΜL (ref 0.6–4.47)
LYMPHOCYTES NFR BLD AUTO: 31 % (ref 14–44)
MCH RBC QN AUTO: 29.4 PG (ref 26.8–34.3)
MCHC RBC AUTO-ENTMCNC: 33.2 G/DL (ref 31.4–37.4)
MCV RBC AUTO: 89 FL (ref 82–98)
MONOCYTES # BLD AUTO: 0.66 THOUSAND/ΜL (ref 0.17–1.22)
MONOCYTES NFR BLD AUTO: 10 % (ref 4–12)
NEUTROPHILS # BLD AUTO: 3.79 THOUSANDS/ΜL (ref 1.85–7.62)
NEUTS SEG NFR BLD AUTO: 55 % (ref 43–75)
NONHDLC SERPL-MCNC: 180 MG/DL
NRBC BLD AUTO-RTO: 0 /100 WBCS
PLATELET # BLD AUTO: 204 THOUSANDS/UL (ref 149–390)
PMV BLD AUTO: 12.6 FL (ref 8.9–12.7)
POTASSIUM SERPL-SCNC: 3.6 MMOL/L (ref 3.5–5.3)
RBC # BLD AUTO: 5.03 MILLION/UL (ref 3.88–5.62)
SODIUM SERPL-SCNC: 140 MMOL/L (ref 136–145)
TRIGL SERPL-MCNC: 166 MG/DL
WBC # BLD AUTO: 6.73 THOUSAND/UL (ref 4.31–10.16)

## 2020-02-27 PROCEDURE — 36415 COLL VENOUS BLD VENIPUNCTURE: CPT

## 2020-02-27 PROCEDURE — 80048 BASIC METABOLIC PNL TOTAL CA: CPT

## 2020-02-27 PROCEDURE — 85025 COMPLETE CBC W/AUTO DIFF WBC: CPT

## 2020-02-27 PROCEDURE — 80061 LIPID PANEL: CPT

## 2020-02-27 NOTE — TELEPHONE ENCOUNTER
Left message on voice mail -referral for nutritional services sent- fatty liver  Follow up with nutritional services if not contacted for appointment [phone]  Call back for any questions or concerns

## 2020-03-02 PROBLEM — K21.9 GASTROESOPHAGEAL REFLUX DISEASE WITHOUT ESOPHAGITIS: Status: ACTIVE | Noted: 2020-03-02

## 2020-03-02 PROBLEM — K75.81 STEATOHEPATITIS: Status: ACTIVE | Noted: 2020-03-02

## 2020-03-02 PROBLEM — R79.89 ABNORMAL LFTS: Status: ACTIVE | Noted: 2020-03-02

## 2020-03-03 ENCOUNTER — TELEPHONE (OUTPATIENT)
Dept: GASTROENTEROLOGY | Facility: CLINIC | Age: 55
End: 2020-03-03

## 2020-03-03 DIAGNOSIS — K29.70 GASTRITIS WITHOUT BLEEDING, UNSPECIFIED CHRONICITY, UNSPECIFIED GASTRITIS TYPE: Primary | ICD-10-CM

## 2020-03-03 NOTE — TELEPHONE ENCOUNTER
----- Message from Chuck Ricci MD sent at 2/20/2020  1:55 PM EST -----  Hi Miranda Holloway,   Can you please mail the script of the labs that I ordered for this patient to his home    Thank you

## 2020-03-04 ENCOUNTER — HOSPITAL ENCOUNTER (OUTPATIENT)
Dept: RADIOLOGY | Facility: HOSPITAL | Age: 55
Discharge: HOME | End: 2020-03-04
Attending: INTERNAL MEDICINE
Payer: COMMERCIAL

## 2020-03-04 ENCOUNTER — APPOINTMENT (OUTPATIENT)
Dept: LAB | Facility: HOSPITAL | Age: 55
End: 2020-03-04
Attending: INTERNAL MEDICINE
Payer: COMMERCIAL

## 2020-03-04 DIAGNOSIS — R79.89 ELEVATED LFTS: ICD-10-CM

## 2020-03-04 DIAGNOSIS — R05.9 COUGH: ICD-10-CM

## 2020-03-04 DIAGNOSIS — K29.70 GASTRITIS WITHOUT BLEEDING, UNSPECIFIED CHRONICITY, UNSPECIFIED GASTRITIS TYPE: ICD-10-CM

## 2020-03-04 LAB
FERRITIN SERPL-MCNC: 292 NG/ML (ref 8–388)
HBV CORE AB SER QL: NORMAL
HBV CORE IGM SER QL: NORMAL
HBV SURFACE AG SER QL: NORMAL
HCV AB SER QL: NORMAL
IRON SATN MFR SERPL: 26 %
IRON SERPL-MCNC: 84 UG/DL (ref 65–175)
TIBC SERPL-MCNC: 325 UG/DL (ref 250–450)

## 2020-03-04 PROCEDURE — 83540 ASSAY OF IRON: CPT

## 2020-03-04 PROCEDURE — 86235 NUCLEAR ANTIGEN ANTIBODY: CPT

## 2020-03-04 PROCEDURE — 71046 X-RAY EXAM CHEST 2 VIEWS: CPT

## 2020-03-04 PROCEDURE — 86705 HEP B CORE ANTIBODY IGM: CPT

## 2020-03-04 PROCEDURE — 83550 IRON BINDING TEST: CPT

## 2020-03-04 PROCEDURE — 36415 COLL VENOUS BLD VENIPUNCTURE: CPT

## 2020-03-04 PROCEDURE — 82728 ASSAY OF FERRITIN: CPT

## 2020-03-04 PROCEDURE — 86803 HEPATITIS C AB TEST: CPT

## 2020-03-04 PROCEDURE — 82784 ASSAY IGA/IGD/IGG/IGM EACH: CPT

## 2020-03-04 PROCEDURE — 83516 IMMUNOASSAY NONANTIBODY: CPT

## 2020-03-04 PROCEDURE — 87340 HEPATITIS B SURFACE AG IA: CPT

## 2020-03-04 PROCEDURE — 86256 FLUORESCENT ANTIBODY TITER: CPT

## 2020-03-04 PROCEDURE — 86038 ANTINUCLEAR ANTIBODIES: CPT

## 2020-03-04 PROCEDURE — 86255 FLUORESCENT ANTIBODY SCREEN: CPT

## 2020-03-04 PROCEDURE — 86704 HEP B CORE ANTIBODY TOTAL: CPT

## 2020-03-05 LAB
ACTIN IGG SERPL-ACNC: 2 UNITS (ref 0–19)
MITOCHONDRIA M2 IGG SER-ACNC: <20 UNITS (ref 0–20)

## 2020-03-06 ENCOUNTER — TELEPHONE (OUTPATIENT)
Dept: GASTROENTEROLOGY | Facility: CLINIC | Age: 55
End: 2020-03-06

## 2020-03-06 LAB
ENDOMYSIUM IGA SER QL: NEGATIVE
GLIADIN PEPTIDE IGA SER-ACNC: 7 UNITS (ref 0–19)
GLIADIN PEPTIDE IGG SER-ACNC: 2 UNITS (ref 0–19)
IGA SERPL-MCNC: 362 MG/DL (ref 90–386)
RYE IGE QN: NEGATIVE
TTG IGA SER-ACNC: <2 U/ML (ref 0–3)
TTG IGG SER-ACNC: <2 U/ML (ref 0–5)

## 2020-03-06 NOTE — TELEPHONE ENCOUNTER
----- Message from Loren Ha MD sent at 3/3/2020  1:18 PM EST -----  Please inform the patient that the polyps removed were tubular adenomas, would recommend repeat colonoscopy in 3 years  Duodenal biopsies show increase the lymphocytes, will check celiac serologies at this time  No evidence of H pylori organism but there is evidence of gastritis  Would recommend to continue PPI  No need for repeat EGD  Needs to have celiac serologies done    Follow-up in the office in the next few months with PA

## 2020-03-06 NOTE — TELEPHONE ENCOUNTER
Called and relayed results to patient he would like to come by the office for a copy to give to his ENT doctor   Placed recall and HM for Colon

## 2020-03-10 NOTE — PRE-PROCEDURE INSTRUCTIONS
Pre-Surgery Instructions:   Medication Instructions    omeprazole (PriLOSEC) 40 MG capsule Instructed patient per Anesthesia Guidelines  Pre op instructions reviewed with pt on 3/10    Meds , bathing and hospital instructions reviewed with understanding at this time

## 2020-03-12 ENCOUNTER — ANESTHESIA EVENT (OUTPATIENT)
Dept: PERIOP | Facility: HOSPITAL | Age: 55
End: 2020-03-12
Payer: COMMERCIAL

## 2020-03-12 NOTE — ANESTHESIA PREPROCEDURE EVALUATION
Review of Systems/Medical History  Patient summary reviewed  Chart reviewed  No history of anesthetic complications     Cardiovascular  Hyperlipidemia, No CAD ,    Pulmonary  Negative pulmonary ROS Sleep apnea ,        GI/Hepatic    GERD , Liver disease (steatohepatitis) ,        Negative  ROS        Endo/Other    Obesity (BMI 31)    GYN       Hematology  Negative hematology ROS      Musculoskeletal  Negative musculoskeletal ROS        Neurology  Negative neurology ROS      Psychology   Anxiety,            Lab Results   Component Value Date    WBC 6 73 02/27/2020    HGB 14 8 02/27/2020    HCT 44 6 02/27/2020    MCV 89 02/27/2020     02/27/2020     Lab Results   Component Value Date    SODIUM 140 02/27/2020    K 3 6 02/27/2020     02/27/2020    CO2 27 02/27/2020    BUN 13 02/27/2020    CREATININE 0 98 02/27/2020    GLUC 102 (H) 09/21/2019    CALCIUM 8 7 02/27/2020     No results found for: INR, PROTIME        Physical Exam    Airway    Mallampati score: III  TM Distance: >3 FB  Neck ROM: full     Dental   No notable dental hx     Cardiovascular  Cardiovascular exam normal    Pulmonary  Pulmonary exam normal     Other Findings        Anesthesia Plan  ASA Score- 3     Anesthesia Type- general with ASA Monitors  Additional Monitors:   Airway Plan: ETT  Plan Factors-    Induction- intravenous  Postoperative Plan- Plan for postoperative opioid use  Planned trial extubation    Informed Consent- Anesthetic plan and risks discussed with patient  I personally reviewed this patient with the CRNA  Discussed and agreed on the Anesthesia Plan with the CRNA  Saeid Rowe

## 2020-03-13 ENCOUNTER — ANESTHESIA (OUTPATIENT)
Dept: PERIOP | Facility: HOSPITAL | Age: 55
End: 2020-03-13
Payer: COMMERCIAL

## 2020-03-13 ENCOUNTER — HOSPITAL ENCOUNTER (OUTPATIENT)
Facility: HOSPITAL | Age: 55
Setting detail: OUTPATIENT SURGERY
Discharge: HOME | End: 2020-03-14
Attending: OTOLARYNGOLOGY | Admitting: OTOLARYNGOLOGY
Payer: COMMERCIAL

## 2020-03-13 DIAGNOSIS — J35.1 HYPERTROPHY OF TONSILS: Primary | ICD-10-CM

## 2020-03-13 DIAGNOSIS — G47.33 OBSTRUCTIVE SLEEP APNEA: ICD-10-CM

## 2020-03-13 DIAGNOSIS — Z98.890 S/P UPPP (UVULOPALATOPHARYNGOPLASTY): ICD-10-CM

## 2020-03-13 PROCEDURE — 42145 REPAIR PALATE PHARYNX/UVULA: CPT | Performed by: OTOLARYNGOLOGY

## 2020-03-13 RX ORDER — ACETAMINOPHEN 160 MG/5ML
640 SUSPENSION ORAL EVERY 4 HOURS PRN
Qty: 300 ML | Refills: 0 | Status: SHIPPED | OUTPATIENT
Start: 2020-03-13 | End: 2020-03-18 | Stop reason: SDUPTHER

## 2020-03-13 RX ORDER — ONDANSETRON 2 MG/ML
INJECTION INTRAMUSCULAR; INTRAVENOUS AS NEEDED
Status: DISCONTINUED | OUTPATIENT
Start: 2020-03-13 | End: 2020-03-13 | Stop reason: SURG

## 2020-03-13 RX ORDER — ACETAMINOPHEN 160 MG/5ML
640 SUSPENSION, ORAL (FINAL DOSE FORM) ORAL EVERY 4 HOURS PRN
Status: DISCONTINUED | OUTPATIENT
Start: 2020-03-13 | End: 2020-03-14 | Stop reason: HOSPADM

## 2020-03-13 RX ORDER — DEXMEDETOMIDINE HYDROCHLORIDE 100 UG/ML
INJECTION, SOLUTION INTRAVENOUS AS NEEDED
Status: DISCONTINUED | OUTPATIENT
Start: 2020-03-13 | End: 2020-03-13 | Stop reason: SURG

## 2020-03-13 RX ORDER — PANTOPRAZOLE SODIUM 40 MG/1
40 TABLET, DELAYED RELEASE ORAL
Status: DISCONTINUED | OUTPATIENT
Start: 2020-03-14 | End: 2020-03-14 | Stop reason: HOSPADM

## 2020-03-13 RX ORDER — SODIUM CHLORIDE, SODIUM LACTATE, POTASSIUM CHLORIDE, CALCIUM CHLORIDE 600; 310; 30; 20 MG/100ML; MG/100ML; MG/100ML; MG/100ML
125 INJECTION, SOLUTION INTRAVENOUS CONTINUOUS
Status: DISCONTINUED | OUTPATIENT
Start: 2020-03-13 | End: 2020-03-14 | Stop reason: HOSPADM

## 2020-03-13 RX ORDER — FENTANYL CITRATE/PF 50 MCG/ML
25 SYRINGE (ML) INJECTION
Status: DISCONTINUED | OUTPATIENT
Start: 2020-03-13 | End: 2020-03-13 | Stop reason: HOSPADM

## 2020-03-13 RX ORDER — ONDANSETRON 2 MG/ML
4 INJECTION INTRAMUSCULAR; INTRAVENOUS ONCE AS NEEDED
Status: DISCONTINUED | OUTPATIENT
Start: 2020-03-13 | End: 2020-03-13 | Stop reason: HOSPADM

## 2020-03-13 RX ORDER — PROPOFOL 10 MG/ML
INJECTION, EMULSION INTRAVENOUS AS NEEDED
Status: DISCONTINUED | OUTPATIENT
Start: 2020-03-13 | End: 2020-03-13 | Stop reason: SURG

## 2020-03-13 RX ORDER — HYDROMORPHONE HCL/PF 1 MG/ML
0.5 SYRINGE (ML) INJECTION
Status: COMPLETED | OUTPATIENT
Start: 2020-03-13 | End: 2020-03-13

## 2020-03-13 RX ORDER — ONDANSETRON 2 MG/ML
4 INJECTION INTRAMUSCULAR; INTRAVENOUS EVERY 6 HOURS PRN
Status: DISCONTINUED | OUTPATIENT
Start: 2020-03-13 | End: 2020-03-14 | Stop reason: HOSPADM

## 2020-03-13 RX ORDER — LIDOCAINE HYDROCHLORIDE 10 MG/ML
INJECTION, SOLUTION EPIDURAL; INFILTRATION; INTRACAUDAL; PERINEURAL AS NEEDED
Status: DISCONTINUED | OUTPATIENT
Start: 2020-03-13 | End: 2020-03-13 | Stop reason: SURG

## 2020-03-13 RX ORDER — LIDOCAINE HYDROCHLORIDE 10 MG/ML
0.5 INJECTION, SOLUTION EPIDURAL; INFILTRATION; INTRACAUDAL; PERINEURAL ONCE AS NEEDED
Status: COMPLETED | OUTPATIENT
Start: 2020-03-13 | End: 2020-03-13

## 2020-03-13 RX ORDER — SODIUM CHLORIDE, SODIUM LACTATE, POTASSIUM CHLORIDE, CALCIUM CHLORIDE 600; 310; 30; 20 MG/100ML; MG/100ML; MG/100ML; MG/100ML
125 INJECTION, SOLUTION INTRAVENOUS CONTINUOUS
Status: DISCONTINUED | OUTPATIENT
Start: 2020-03-13 | End: 2020-03-13

## 2020-03-13 RX ORDER — SODIUM CHLORIDE, SODIUM LACTATE, POTASSIUM CHLORIDE, CALCIUM CHLORIDE 600; 310; 30; 20 MG/100ML; MG/100ML; MG/100ML; MG/100ML
20 INJECTION, SOLUTION INTRAVENOUS CONTINUOUS
Status: DISCONTINUED | OUTPATIENT
Start: 2020-03-13 | End: 2020-03-13

## 2020-03-13 RX ORDER — OXYCODONE HCL 5 MG/5 ML
5 SOLUTION, ORAL ORAL EVERY 4 HOURS PRN
Status: DISCONTINUED | OUTPATIENT
Start: 2020-03-13 | End: 2020-03-14 | Stop reason: HOSPADM

## 2020-03-13 RX ORDER — FENTANYL CITRATE 50 UG/ML
INJECTION, SOLUTION INTRAMUSCULAR; INTRAVENOUS AS NEEDED
Status: DISCONTINUED | OUTPATIENT
Start: 2020-03-13 | End: 2020-03-13 | Stop reason: SURG

## 2020-03-13 RX ORDER — AMOXICILLIN 400 MG/5ML
800 POWDER, FOR SUSPENSION ORAL 2 TIMES DAILY
Qty: 200 ML | Refills: 0 | Status: SHIPPED | OUTPATIENT
Start: 2020-03-13 | End: 2020-03-23

## 2020-03-13 RX ORDER — METOCLOPRAMIDE HYDROCHLORIDE 5 MG/ML
10 INJECTION INTRAMUSCULAR; INTRAVENOUS ONCE AS NEEDED
Status: COMPLETED | OUTPATIENT
Start: 2020-03-13 | End: 2020-03-13

## 2020-03-13 RX ORDER — SUCCINYLCHOLINE/SOD CL,ISO/PF 100 MG/5ML
SYRINGE (ML) INTRAVENOUS AS NEEDED
Status: DISCONTINUED | OUTPATIENT
Start: 2020-03-13 | End: 2020-03-13 | Stop reason: SURG

## 2020-03-13 RX ORDER — ROCURONIUM BROMIDE 10 MG/ML
INJECTION, SOLUTION INTRAVENOUS AS NEEDED
Status: DISCONTINUED | OUTPATIENT
Start: 2020-03-13 | End: 2020-03-13 | Stop reason: SURG

## 2020-03-13 RX ORDER — OXYCODONE HCL 5 MG/5 ML
5 SOLUTION, ORAL ORAL EVERY 4 HOURS PRN
Qty: 150 ML | Refills: 0 | Status: SHIPPED | OUTPATIENT
Start: 2020-03-13 | End: 2020-03-18

## 2020-03-13 RX ORDER — DEXAMETHASONE SODIUM PHOSPHATE 10 MG/ML
INJECTION, SOLUTION INTRAMUSCULAR; INTRAVENOUS AS NEEDED
Status: DISCONTINUED | OUTPATIENT
Start: 2020-03-13 | End: 2020-03-13 | Stop reason: SURG

## 2020-03-13 RX ORDER — DEXAMETHASONE SODIUM PHOSPHATE 4 MG/ML
8 INJECTION, SOLUTION INTRA-ARTICULAR; INTRALESIONAL; INTRAMUSCULAR; INTRAVENOUS; SOFT TISSUE ONCE AS NEEDED
Status: COMPLETED | OUTPATIENT
Start: 2020-03-13 | End: 2020-03-13

## 2020-03-13 RX ORDER — SODIUM CHLORIDE 9 MG/ML
INJECTION, SOLUTION INTRAVENOUS CONTINUOUS PRN
Status: DISCONTINUED | OUTPATIENT
Start: 2020-03-13 | End: 2020-03-13 | Stop reason: SURG

## 2020-03-13 RX ADMIN — OXYCODONE HYDROCHLORIDE 5 MG: 5 SOLUTION ORAL at 20:16

## 2020-03-13 RX ADMIN — FENTANYL CITRATE 25 MCG: 50 INJECTION INTRAMUSCULAR; INTRAVENOUS at 15:50

## 2020-03-13 RX ADMIN — FENTANYL CITRATE 50 MCG: 50 INJECTION, SOLUTION INTRAMUSCULAR; INTRAVENOUS at 14:13

## 2020-03-13 RX ADMIN — SODIUM CHLORIDE: 0.9 INJECTION, SOLUTION INTRAVENOUS at 13:40

## 2020-03-13 RX ADMIN — SUGAMMADEX 400 MG: 100 INJECTION, SOLUTION INTRAVENOUS at 15:08

## 2020-03-13 RX ADMIN — HYDROMORPHONE HYDROCHLORIDE 0.5 MG: 1 INJECTION, SOLUTION INTRAMUSCULAR; INTRAVENOUS; SUBCUTANEOUS at 15:53

## 2020-03-13 RX ADMIN — DEXMEDETOMIDINE 1 MCG/KG/HR: 100 INJECTION, SOLUTION, CONCENTRATE INTRAVENOUS at 13:53

## 2020-03-13 RX ADMIN — PHENYLEPHRINE HYDROCHLORIDE 150 MCG: 10 INJECTION INTRAVENOUS at 14:47

## 2020-03-13 RX ADMIN — ROCURONIUM BROMIDE 20 MG: 50 INJECTION, SOLUTION INTRAVENOUS at 13:58

## 2020-03-13 RX ADMIN — HYDROMORPHONE HYDROCHLORIDE 0.5 MG: 1 INJECTION, SOLUTION INTRAMUSCULAR; INTRAVENOUS; SUBCUTANEOUS at 15:57

## 2020-03-13 RX ADMIN — LIDOCAINE HYDROCHLORIDE 50 MG: 10 INJECTION, SOLUTION EPIDURAL; INFILTRATION; INTRACAUDAL; PERINEURAL at 13:46

## 2020-03-13 RX ADMIN — SODIUM CHLORIDE, SODIUM LACTATE, POTASSIUM CHLORIDE, AND CALCIUM CHLORIDE 125 ML/HR: .6; .31; .03; .02 INJECTION, SOLUTION INTRAVENOUS at 11:55

## 2020-03-13 RX ADMIN — HYDROMORPHONE HYDROCHLORIDE 0.5 MG: 1 INJECTION, SOLUTION INTRAMUSCULAR; INTRAVENOUS; SUBCUTANEOUS at 16:30

## 2020-03-13 RX ADMIN — SODIUM CHLORIDE, SODIUM LACTATE, POTASSIUM CHLORIDE, AND CALCIUM CHLORIDE 20 ML/HR: .6; .31; .03; .02 INJECTION, SOLUTION INTRAVENOUS at 15:59

## 2020-03-13 RX ADMIN — PROPOFOL 200 MG: 10 INJECTION, EMULSION INTRAVENOUS at 13:46

## 2020-03-13 RX ADMIN — DEXMEDETOMIDINE 4 MCG: 100 INJECTION, SOLUTION, CONCENTRATE INTRAVENOUS at 15:10

## 2020-03-13 RX ADMIN — PHENYLEPHRINE HYDROCHLORIDE 40 MCG/MIN: 10 INJECTION INTRAVENOUS at 14:46

## 2020-03-13 RX ADMIN — LIDOCAINE HYDROCHLORIDE 0.5 ML: 10 INJECTION, SOLUTION EPIDURAL; INFILTRATION; INTRACAUDAL; PERINEURAL at 11:56

## 2020-03-13 RX ADMIN — ROCURONIUM BROMIDE 30 MG: 50 INJECTION, SOLUTION INTRAVENOUS at 14:43

## 2020-03-13 RX ADMIN — ONDANSETRON 4 MG: 2 INJECTION INTRAMUSCULAR; INTRAVENOUS at 13:52

## 2020-03-13 RX ADMIN — DEXAMETHASONE SODIUM PHOSPHATE 10 MG: 10 INJECTION, SOLUTION INTRAMUSCULAR; INTRAVENOUS at 13:52

## 2020-03-13 RX ADMIN — DEXMEDETOMIDINE 2 MCG: 100 INJECTION, SOLUTION, CONCENTRATE INTRAVENOUS at 14:11

## 2020-03-13 RX ADMIN — FENTANYL CITRATE 25 MCG: 50 INJECTION INTRAMUSCULAR; INTRAVENOUS at 15:45

## 2020-03-13 RX ADMIN — Medication 100 MG: at 13:47

## 2020-03-13 RX ADMIN — DEXMEDETOMIDINE 2 MCG: 100 INJECTION, SOLUTION, CONCENTRATE INTRAVENOUS at 14:24

## 2020-03-13 RX ADMIN — SODIUM CHLORIDE: 0.9 INJECTION, SOLUTION INTRAVENOUS at 14:49

## 2020-03-13 RX ADMIN — METOCLOPRAMIDE 10 MG: 5 INJECTION, SOLUTION INTRAMUSCULAR; INTRAVENOUS at 16:31

## 2020-03-13 RX ADMIN — DEXAMETHASONE SODIUM PHOSPHATE 8 MG: 4 INJECTION, SOLUTION INTRAMUSCULAR; INTRAVENOUS at 15:50

## 2020-03-13 RX ADMIN — HYDROMORPHONE HYDROCHLORIDE 0.5 MG: 1 INJECTION, SOLUTION INTRAMUSCULAR; INTRAVENOUS; SUBCUTANEOUS at 16:12

## 2020-03-13 RX ADMIN — FENTANYL CITRATE 50 MCG: 50 INJECTION, SOLUTION INTRAMUSCULAR; INTRAVENOUS at 13:46

## 2020-03-13 NOTE — H&P
Brad Church 53 y o  male MRN: [id number]  Unit/Bed#: OR [place] Encounter: [id number]            History of Present Illness     Reason for Visit[de-identified] surgery  HPI: Brad Church is a 53y o  year old male with long-standing history of sleep apnea, he did have a sleep study in [place] and at some point used a CPAP machine, he did not tolerate  He was advised to have UPPP,  but surgery deferred as he relocated to [place]  He has not been using any device in the last couple of years and has noticed very poor quality of sleep  On 11/22/19 had a sleep study that revealed a 157 apneas, he was then titrated with a CPAP and posttreatment revealed significant improvement with O2 sats above 85%      Today he also reports of discomfort on the left ear,and postnasal drip  Does have history of recurrent sinusitis      Review of Systems      Complete review done, only positive for the symptoms described in the H&P section above      Historical Information   Past Medical History:   Diagnosis Date    Abnormal weight gain     Last assessed 3/24/2014    Allergic     Anxiety     Gastroesophageal reflux disease without esophagitis 3/2/2020    Hemorrhoids     History of gastroesophageal reflux (GERD)     Hyperlipidemia     Last assessed 3/24/2014     Sleep apnea     insurance not covering     Past Surgical History:   Procedure Laterality Date    COLONOSCOPY       Social History   Social History     Substance and Sexual Activity   Alcohol Use No     Social History     Substance and Sexual Activity   Drug Use No     Social History     Tobacco Use   Smoking Status Never Smoker   Smokeless Tobacco Never Used     Family History:   Family History   Problem Relation Age of Onset    Hypertension Mother     No Known Problems Father     COPD Maternal Uncle     No Known Problems Sister     No Known Problems Brother        Meds/Allergies   Current Facility-Administered Medications   Medication Dose Route Frequency    lactated ringers infusion  125 mL/hr Intravenous Continuous         No Known Allergies    Objective       Physical Exam   Blood pressure 126/86, pulse 77, temperature 98 5 °F (36 9 °C), temperature source Tympanic, resp  rate 18, height 5' 7" (1 702 m), weight 90 7 kg (200 lb), SpO2 96 %  Constitutional: Oriented to person, place, and time  Well-developed and well-nourished, no apparent distress, non-toxic appearance  Cooperative, able to hear and answer questions without difficulty  Voice: Normal voice quality  Head: Normocephalic, atraumatic  No scars, masses or lesions  Face: Symmetric, no edema, no sinus tenderness  Eyes: Vision grossly intact, extra-ocular movement intact  Right Ear: External ear normal   Auditory canal clear  Tympanic membrane well-appearing, without retraction or scarring  No fluid present  No post-auricular erythema or tenderness  Left Ear: External ear normal   Auditory canal clear  Tympanic membrane well-appearing, without retraction or scarring  No fluid present  No post-auricular erythema or tenderness  Nose: Septum midline, Mucosa moist, turbinates normal size, no edema  No polyps or masses, no discharge evident  Oral cavity:  Lips normal, no mucosal lesions  Dentition intact, gingiva normal in appearance  Mucosa moist,  Tongue mobile, floor of mouth normal   Hard palate unremarkable  No masses or lesions  Oropharynx: Very elevated uvula and soft palate  Narrow oropharyngeal inlet  Tonsils 2+ unremarkable  Posterior pharyngeal wall clear  No masses or lesions  Salivary glands:  Parotid glands and submandibular glands symmetric, no enlargement or tenderness  Neck: Normal laryngeal elevation with swallow  Trachea midline  No masses or lesions  No palpable adenopathy  Thyroid: normal in size, and consistency, unremarkable without tenderness or palpable nodules  Pulmonary/Chest: Normal effort and rate  No respiratory distress  Musculoskeletal: Normal range of motion  Neurological: Cranial nerves 2-12 intact  Skin: Skin is warm and dry  Psychiatric: Normal mood and affect  Nasal endoscopy in office:      Adenoids 1+  Assessment:  2  MARIO (obstructive sleep apnea)      3  Hearing loss due to cerumen impaction, left      4  Chronic bacterial otitis externa, left  ciprofloxacin-dexamethasone (CIPRODEX) otic suspension      Plan:  Risk benefits and alternatives of UPPP were discussed with the patient  He is inclined to proceed with surgery  I did discuss the fact that there is a small failure rate and the possibility of needing CPAP even despite surgery    Informed consent was obtained

## 2020-03-13 NOTE — OP NOTE
OPERATIVE REPORT  PATIENT NAME: Debbi Woodall    :  1965  MRN: [id number]  Pt Location: [facility] OR ROOM 05    SURGERY DATE: 3/13/2020    Surgeon(s) and Role:     Chelly Del Rosario MD - Primary    Preop Diagnosis:  Obstructive sleep apnea [G47 33]    Post-Op Diagnosis Codes:     * Obstructive sleep apnea [G47 33]    Procedure(s) (LRB):  UVULOPALATOPHARYNGOPLASTY (UPPP) (N/A)    Specimen(s):  * No specimens in log *    Estimated Blood Loss:   Minimal    Drains:  * No LDAs found *    Anesthesia Type:   General    Operative Indications:  Obstructive sleep apnea [G47 33]  Hypertrophy of tonsils    Operative Findings:  Tonsils 3+, wide and elongated uvula, elongated soft palate  1+ adenoids    Complications:   None    Procedure and Technique:  OPERATIVE REPORT  PATIENT NAME: Debbi Woodall    :  1965  MRN: [id number]  Pt Location: [facility] OR ROOM 05    SURGERY DATE: 3/13/2020    Surgeon(s) and Role:     Chelly Del Rosario MD - Primary    Obstructive sleep apnea [G47 33]    Post-Op Diagnosis Codes:     * Obstructive sleep apnea [G47 33]    Procedure(s):  UVULOPALATOPHARYNGOPLASTY (UPPP)    Specimen(s):  * No specimens in log *    Estimated Blood Loss:   Minimal    Drains:  * No LDAs found *    Anesthesia Type:   General    Operative Indications:  Obstructive sleep apnea [G47 33]        PROCEDURE IN DETAIL  The patient is a 53y o  years old malewith severe snoring, with significant social impact  Sleep study failed to demonstrate significant sleep apnea  R/B/A of observation, CPAP and surgery were discussed  Patient opted for surgery and gave informed consent  The patient was positively identified and transferred onto the operating table in the supine position  Appropriate monitoring devices were put in place, anesthesia was induced and the patient was intubated without difficulty  The operating room table was then turned 90 degrees, and a shoulder roll was placed     Before proceeding further, a time-out was taken during which the patients identification and planned surgical procedure were confirmed  A McIvor oral gag was introduced opened and suspended from the edge of the Thompson stand  Palpation of the hard palate revealed no submucosal cleft  The right tonsil was grasped, retracted medially and dissected free of the surrounding tissue using the electrocautery protected spatula tip  In a similar fashion, the left tonsil was removed, and hemostasis was accomplished in bilateral tonsillar fossae using the suction bovie  Attention was directed to the uvula and soft palate that were noticed to be elongated  Uvula was resected at the base with the Bovie, and 5 mm of soft palate bilaterally was excised as well  3/0 Vycril stitches were then placed advancing the posterior pilar anteriorly and closing mucosa over uvular stump and soft palate mucosal edges      A red rubber catheter was introduced on the right nasal cavity and used to retract the soft palate  The adenoids were noticed to have mild hypertrophy this were ablated with suction Bovie  Scant amount of thick mucus was noticed on the choanal aperture left side  The McIvor oral gag was let down for two minutes and reopened  Good hemostasis was noted  The McIvor oral gag were then removed  Anesthesia was reversed  The patient was awakened, extubated and taken to the recovery room in stable condition  All counts were correct at the end of the case, and no complications were encountered      I was present for the entire procedure    Patient Disposition:  PACU     SIGNATURE: Robel Martin MD  DATE: March 13, 2020  TIME: 4:11 PM

## 2020-03-13 NOTE — ANESTHESIA POSTPROCEDURE EVALUATION
Post-Op Assessment Note    CV Status:  Stable  Pain Score: 2    Pain management: adequate     Mental Status:  Alert and awake   Hydration Status:  Euvolemic   PONV Controlled:  Controlled   Airway Patency:  Patent   Post Op Vitals Reviewed: Yes      Staff: CRNA           /81 (03/13/20 1526)    Temp (!) 97 °F (36 1 °C) (03/13/20 1526)    Pulse 71 (03/13/20 1526)   Resp 18 (03/13/20 1526)    SpO2 98 % (03/13/20 1526)

## 2020-03-14 VITALS
RESPIRATION RATE: 16 BRPM | TEMPERATURE: 97.8 F | HEIGHT: 67 IN | SYSTOLIC BLOOD PRESSURE: 127 MMHG | DIASTOLIC BLOOD PRESSURE: 78 MMHG | WEIGHT: 201.5 LBS | OXYGEN SATURATION: 93 % | HEART RATE: 86 BPM | BODY MASS INDEX: 31.63 KG/M2

## 2020-03-14 PROCEDURE — 99024 POSTOP FOLLOW-UP VISIT: CPT | Performed by: OTOLARYNGOLOGY

## 2020-03-14 RX ADMIN — PANTOPRAZOLE SODIUM 40 MG: 40 TABLET, DELAYED RELEASE ORAL at 06:06

## 2020-03-14 RX ADMIN — OXYCODONE HYDROCHLORIDE 5 MG: 5 SOLUTION ORAL at 06:06

## 2020-03-14 RX ADMIN — OXYCODONE HYDROCHLORIDE 5 MG: 5 SOLUTION ORAL at 11:08

## 2020-03-14 RX ADMIN — SODIUM CHLORIDE, SODIUM LACTATE, POTASSIUM CHLORIDE, AND CALCIUM CHLORIDE 125 ML/HR: .6; .31; .03; .02 INJECTION, SOLUTION INTRAVENOUS at 00:27

## 2020-03-14 RX ADMIN — OXYCODONE HYDROCHLORIDE 5 MG: 5 SOLUTION ORAL at 00:27

## 2020-03-14 NOTE — PLAN OF CARE
Problem: PAIN - ADULT  Goal: Verbalizes/displays adequate comfort level or baseline comfort level  Description  Interventions:  - Encourage patient to monitor pain and request assistance  - Assess pain using appropriate pain scale  - Administer analgesics based on type and severity of pain and evaluate response  - Implement non-pharmacological measures as appropriate and evaluate response  - Consider cultural and social influences on pain and pain management  - Notify physician/advanced practitioner if interventions unsuccessful or patient reports new pain  Outcome: Progressing     Problem: INFECTION - ADULT  Goal: Absence or prevention of progression during hospitalization  Description  INTERVENTIONS:  - Assess and monitor for signs and symptoms of infection  - Monitor lab/diagnostic results  - Monitor all insertion sites, i e  indwelling lines, tubes, and drains  - Monitor endotracheal if appropriate and nasal secretions for changes in amount and color  - [place] appropriate cooling/warming therapies per order  - Administer medications as ordered  - Instruct and encourage patient and family to use good hand hygiene technique  - Identify and instruct in appropriate isolation precautions for identified infection/condition  Outcome: Progressing  Goal: Absence of fever/infection during neutropenic period  Description  INTERVENTIONS:  - Monitor WBC    Outcome: Progressing     Problem: SAFETY ADULT  Goal: Patient will remain free of falls  Description  INTERVENTIONS:  - Assess patient frequently for physical needs  -  Identify cognitive and physical deficits and behaviors that affect risk of falls    -  [place] fall precautions as indicated by assessment   - Educate patient/family on patient safety including physical limitations  - Instruct patient to call for assistance with activity based on assessment  - Modify environment to reduce risk of injury  - Consider OT/PT consult to assist with strengthening/mobility  Outcome: Progressing  Goal: Maintain or return to baseline ADL function  Description  INTERVENTIONS:  -  Assess patient's ability to carry out ADLs; assess patient's baseline for ADL function and identify physical deficits which impact ability to perform ADLs (bathing, care of mouth/teeth, toileting, grooming, dressing, etc )  - Assess/evaluate cause of self-care deficits   - Assess range of motion  - Assess patient's mobility; develop plan if impaired  - Assess patient's need for assistive devices and provide as appropriate  - Encourage maximum independence but intervene and supervise when necessary  - Involve family in performance of ADLs  - Assess for home care needs following discharge   - Consider OT consult to assist with ADL evaluation and planning for discharge  - Provide patient education as appropriate  Outcome: Progressing  Goal: Maintain or return mobility status to optimal level  Description  INTERVENTIONS:  - Assess patient's baseline mobility status (ambulation, transfers, stairs, etc )    - Identify cognitive and physical deficits and behaviors that affect mobility  - Identify mobility aids required to assist with transfers and/or ambulation (gait belt, sit-to-stand, lift, walker, cane, etc )  - [place] fall precautions as indicated by assessment  - Record patient progress and toleration of activity level on Mobility SBAR; progress patient to next Phase/Stage  - Instruct patient to call for assistance with activity based on assessment  - Consider rehabilitation consult to assist with strengthening/weightbearing, etc   Outcome: Progressing     Problem: DISCHARGE PLANNING  Goal: Discharge to home or other facility with appropriate resources  Description  INTERVENTIONS:  - Identify barriers to discharge w/patient and caregiver  - Arrange for needed discharge resources and transportation as appropriate  - Identify discharge learning needs (meds, wound care, etc )  - Arrange for interpretive services to assist at discharge as needed  - Refer to Case Management Department for coordinating discharge planning if the patient needs post-hospital services based on physician/advanced practitioner order or complex needs related to functional status, cognitive ability, or social support system  Outcome: Progressing     Problem: Knowledge Deficit  Goal: Patient/family/caregiver demonstrates understanding of disease process, treatment plan, medications, and discharge instructions  Description  Complete learning assessment and assess knowledge base    Interventions:  - Provide teaching at level of understanding  - Provide teaching via preferred learning methods  Outcome: Progressing

## 2020-03-14 NOTE — PROGRESS NOTES
Otolaryngology (ENT) Progress Note    Randall Montalvo  [id number]  1965    No acute events  Nausea resolved  Patient still having pain on swallowing  Can tolerate liquids  Vitals:    03/14/20 0732   BP: 127/78   Pulse: 86   Resp: 16   Temp: 97 8 °F (36 6 °C)   SpO2: 93%       Physical Exam:  NAD  Breathing comfortably  Palate appropriately swollen  Sutures intact  No bleeding  Assessment: POD#1 tonsillectomy, UPPP    Plan: Discharge to home today  Will follow up in the office for post-op appointment       Devonte Olmos MD  Otolaryngology - Head & Neck Surgery  [facility]  03/14/20  10:13 AM

## 2020-03-14 NOTE — PLAN OF CARE
Problem: PAIN - ADULT  Goal: Verbalizes/displays adequate comfort level or baseline comfort level  Description  Interventions:  - Encourage patient to monitor pain and request assistance  - Assess pain using appropriate pain scale  - Administer analgesics based on type and severity of pain and evaluate response  - Implement non-pharmacological measures as appropriate and evaluate response  - Consider cultural and social influences on pain and pain management  - Notify physician/advanced practitioner if interventions unsuccessful or patient reports new pain  Outcome: Progressing     Problem: INFECTION - ADULT  Goal: Absence or prevention of progression during hospitalization  Description  INTERVENTIONS:  - Assess and monitor for signs and symptoms of infection  - Monitor lab/diagnostic results  - Monitor all insertion sites, i e  indwelling lines, tubes, and drains  - Monitor endotracheal if appropriate and nasal secretions for changes in amount and color  - [place] appropriate cooling/warming therapies per order  - Administer medications as ordered  - Instruct and encourage patient and family to use good hand hygiene technique  - Identify and instruct in appropriate isolation precautions for identified infection/condition  Outcome: Progressing  Goal: Absence of fever/infection during neutropenic period  Description  INTERVENTIONS:  - Monitor WBC    Outcome: Progressing     Problem: SAFETY ADULT  Goal: Patient will remain free of falls  Description  INTERVENTIONS:  - Assess patient frequently for physical needs  -  Identify cognitive and physical deficits and behaviors that affect risk of falls    -  [place] fall precautions as indicated by assessment   - Educate patient/family on patient safety including physical limitations  - Instruct patient to call for assistance with activity based on assessment  - Modify environment to reduce risk of injury  - Consider OT/PT consult to assist with strengthening/mobility  Outcome: Progressing  Goal: Maintain or return to baseline ADL function  Description  INTERVENTIONS:  -  Assess patient's ability to carry out ADLs; assess patient's baseline for ADL function and identify physical deficits which impact ability to perform ADLs (bathing, care of mouth/teeth, toileting, grooming, dressing, etc )  - Assess/evaluate cause of self-care deficits   - Assess range of motion  - Assess patient's mobility; develop plan if impaired  - Assess patient's need for assistive devices and provide as appropriate  - Encourage maximum independence but intervene and supervise when necessary  - Involve family in performance of ADLs  - Assess for home care needs following discharge   - Consider OT consult to assist with ADL evaluation and planning for discharge  - Provide patient education as appropriate  Outcome: Progressing  Goal: Maintain or return mobility status to optimal level  Description  INTERVENTIONS:  - Assess patient's baseline mobility status (ambulation, transfers, stairs, etc )    - Identify cognitive and physical deficits and behaviors that affect mobility  - Identify mobility aids required to assist with transfers and/or ambulation (gait belt, sit-to-stand, lift, walker, cane, etc )  - [place] fall precautions as indicated by assessment  - Record patient progress and toleration of activity level on Mobility SBAR; progress patient to next Phase/Stage  - Instruct patient to call for assistance with activity based on assessment  - Consider rehabilitation consult to assist with strengthening/weightbearing, etc   Outcome: Progressing     Problem: DISCHARGE PLANNING  Goal: Discharge to home or other facility with appropriate resources  Description  INTERVENTIONS:  - Identify barriers to discharge w/patient and caregiver  - Arrange for needed discharge resources and transportation as appropriate  - Identify discharge learning needs (meds, wound care, etc )  - Arrange for interpretive services to assist at discharge as needed  - Refer to Case Management Department for coordinating discharge planning if the patient needs post-hospital services based on physician/advanced practitioner order or complex needs related to functional status, cognitive ability, or social support system  Outcome: Progressing     Problem: Knowledge Deficit  Goal: Patient/family/caregiver demonstrates understanding of disease process, treatment plan, medications, and discharge instructions  Description  Complete learning assessment and assess knowledge base    Interventions:  - Provide teaching at level of understanding  - Provide teaching via preferred learning methods  Outcome: Progressing

## 2020-03-23 ENCOUNTER — TELEPHONE (OUTPATIENT)
Dept: PULMONOLOGY | Facility: MEDICAL CENTER | Age: 55
End: 2020-03-23

## 2020-03-31 PROBLEM — E78.5 HYPERLIPIDEMIA: Status: ACTIVE | Noted: 2020-02-27

## 2020-03-31 PROBLEM — E66.09 OBESITY DUE TO EXCESS CALORIES: Status: ACTIVE | Noted: 2020-03-31

## 2020-04-08 ENCOUNTER — TELEPHONE (OUTPATIENT)
Dept: GASTROENTEROLOGY | Facility: CLINIC | Age: 55
End: 2020-04-08

## 2020-04-13 ENCOUNTER — TELEPHONE (OUTPATIENT)
Dept: GASTROENTEROLOGY | Facility: AMBULARY SURGERY CENTER | Age: 55
End: 2020-04-13

## 2020-04-13 ENCOUNTER — TELEMEDICINE (OUTPATIENT)
Dept: GASTROENTEROLOGY | Facility: AMBULARY SURGERY CENTER | Age: 55
End: 2020-04-13
Payer: COMMERCIAL

## 2020-04-13 DIAGNOSIS — D12.6 TUBULAR ADENOMA OF COLON: ICD-10-CM

## 2020-04-13 DIAGNOSIS — K21.9 GASTROESOPHAGEAL REFLUX DISEASE WITHOUT ESOPHAGITIS: Primary | ICD-10-CM

## 2020-04-13 DIAGNOSIS — K75.81 STEATOHEPATITIS: ICD-10-CM

## 2020-04-13 DIAGNOSIS — R79.89 ABNORMAL LFTS: ICD-10-CM

## 2020-04-13 PROCEDURE — 99441 PR PHYS/QHP TELEPHONE EVALUATION 5-10 MIN: CPT | Performed by: INTERNAL MEDICINE

## 2020-06-03 ENCOUNTER — TELEPHONE (OUTPATIENT)
Dept: PULMONOLOGY | Facility: MEDICAL CENTER | Age: 55
End: 2020-06-03

## 2020-11-25 ENCOUNTER — OFFICE VISIT (OUTPATIENT)
Dept: FAMILY MEDICINE CLINIC | Facility: CLINIC | Age: 55
End: 2020-11-25
Payer: COMMERCIAL

## 2020-11-25 VITALS
HEIGHT: 67 IN | SYSTOLIC BLOOD PRESSURE: 128 MMHG | HEART RATE: 78 BPM | TEMPERATURE: 97.5 F | DIASTOLIC BLOOD PRESSURE: 84 MMHG | BODY MASS INDEX: 31.13 KG/M2 | RESPIRATION RATE: 16 BRPM | WEIGHT: 198.38 LBS | OXYGEN SATURATION: 99 %

## 2020-11-25 DIAGNOSIS — R68.84 JAW PAIN: ICD-10-CM

## 2020-11-25 DIAGNOSIS — H60.541 ECZEMA OF RIGHT EXTERNAL EAR: Primary | ICD-10-CM

## 2020-11-25 DIAGNOSIS — M26.609 TMJ (TEMPOROMANDIBULAR JOINT SYNDROME): ICD-10-CM

## 2020-11-25 PROCEDURE — 99213 OFFICE O/P EST LOW 20 MIN: CPT | Performed by: FAMILY MEDICINE

## 2020-11-25 RX ORDER — TRIAMCINOLONE ACETONIDE 5 MG/G
CREAM TOPICAL 3 TIMES DAILY
Qty: 30 G | Refills: 0 | Status: SHIPPED | OUTPATIENT
Start: 2020-11-25 | End: 2021-05-17

## 2020-11-25 RX ORDER — IBUPROFEN 600 MG/1
600 TABLET ORAL EVERY 6 HOURS PRN
Qty: 30 TABLET | Refills: 0 | Status: SHIPPED | OUTPATIENT
Start: 2020-11-25

## 2020-11-25 RX ORDER — IBUPROFEN 600 MG/1
600 TABLET ORAL EVERY 6 HOURS PRN
Qty: 30 TABLET | Refills: 0 | Status: SHIPPED | OUTPATIENT
Start: 2020-11-25 | End: 2020-11-25 | Stop reason: SDUPTHER

## 2020-12-30 ENCOUNTER — TELEMEDICINE (OUTPATIENT)
Dept: FAMILY MEDICINE CLINIC | Facility: CLINIC | Age: 55
End: 2020-12-30
Payer: COMMERCIAL

## 2020-12-30 DIAGNOSIS — Z20.822 CLOSE EXPOSURE TO COVID-19 VIRUS: ICD-10-CM

## 2020-12-30 DIAGNOSIS — Z20.822 CLOSE EXPOSURE TO COVID-19 VIRUS: Primary | ICD-10-CM

## 2020-12-30 PROCEDURE — U0003 INFECTIOUS AGENT DETECTION BY NUCLEIC ACID (DNA OR RNA); SEVERE ACUTE RESPIRATORY SYNDROME CORONAVIRUS 2 (SARS-COV-2) (CORONAVIRUS DISEASE [COVID-19]), AMPLIFIED PROBE TECHNIQUE, MAKING USE OF HIGH THROUGHPUT TECHNOLOGIES AS DESCRIBED BY CMS-2020-01-R: HCPCS | Performed by: STUDENT IN AN ORGANIZED HEALTH CARE EDUCATION/TRAINING PROGRAM

## 2020-12-30 PROCEDURE — 99213 OFFICE O/P EST LOW 20 MIN: CPT | Performed by: FAMILY MEDICINE

## 2021-01-01 LAB — SARS-COV-2 RNA SPEC QL NAA+PROBE: NOT DETECTED

## 2021-03-01 NOTE — PROGRESS NOTES
[facility] - Adult Wellness Exam   [place], [place], 20     Teresa Garay MRN: [id number] : 1965 Age: 63 y o  Assessment/Plan       1  Physical exam  - patient declined immunizations at this visit however states that he will consider upon return visit  -  Patient to reestablish with Gastroenterology and ENT given his respective histories  As noted  -  Lateral thigh discomfort as noted, return to office in 6 weeks for follow-up after physical therapy  -  Since resolved numbness and tingling in the ulnar distribution left upper extremity likely consistent with cubital tunnel syndrome,  Avoid extended periods of compression, if another acute occurrence NSAIDS prn, moist heat, consider splinting     2  History of colon polyps    - Ambulatory referral to Gastroenterology; Future    3  Screening for diabetes mellitus (DM)    - HEMOGLOBIN A1C W/ EAG ESTIMATION; Future      4  History of gastroesophageal reflux (GERD)    - patient reporting reflux symptoms, taking omeprazole  - to reestablish with GI    5  Dyslipidemia    - Lipid Panel with Direct LDL reflex; Future  - f/u repeat lipid panel and re-assess ASCVD risk, further address as appropriate     6  History of ear, nose, and throat (ENT) surgery    - Ambulatory Referral to Otolaryngology; Future    7  Pain of left thigh    - Ambulatory referral to Physical Therapy; Future  -  Patient complaining of some numbness lateral aspect of thigh, BMI 30, possible meralgia paresthetica, trial 6 weeks of physical therapy and return to office after completion of physical therapy to reassess, if persistent complaints consider further workup as appropriate      BMI Counseling: Body mass index is 30 74 kg/m²   The BMI is above normal  Nutrition recommendations include decreasing portion sizes, encouraging healthy choices of fruits and vegetables, decreasing fast food intake, consuming healthier snacks, limiting drinks that contain sugar, moderation in carbohydrate intake, increasing intake of lean protein and reducing intake of saturated and trans fat  Exercise recommendations include exercising 3-5 times per week  Daniel Muir acknowledged understanding of plan, all questions answered  Plan discussed with attending physician Dr Verónica Schmidt  Gloria Santana is a 63 y o  male, presenting today for wellness exam     Current concerns:    Experiences sinus headaches on the left   Gets worse with weather changes  Occasional cough with yellow sputum  Surgery to remove uvula and tonsils last March  "Throat was narrow" he reports, he reports several ENT infections and sleep apnea  Use a C-pap when he sleeps twice a week  Surgery seemed to help he states  Reports hx of nasal septum injury  Patient also offers a few other complaints today  He complains of reflux symptoms  1-2 hours after meal consumption  Numbness of left hand in ulnar nerve distribution when sleeping with arm upright  Burning leg pain left thigh  Dull pain left lower back  No SOB  No palpitation  No lightheadedness with exercise  No N/V/D  No urinary urgency  Diet & Exercise   Regular consumption of fruits & vegetables   Regular consumption of proteins   Junk food/fast food limited   Weekly Exercise: none  BMI: Body mass index is 30 74 kg/m²  Social   Marital Status:  yes  Household Members: 4  Employment Status:    Tobacco Use: Never  Alcohol Use: none  Drug Use: none    Reproductive Health   Sexually Active: yes  Contraception:   Concerns: no    Screening & Preventative   Colon Cancer:Colonoscopy last year  Found 2 polyps and 2 diverticuli  Were excised  Diabetes: ordered    Immunizations   There is no immunization history for the selected administration types on file for this patient      Past Medical History:   Diagnosis Date    Abnormal weight gain     Last assessed 3/24/2014    Allergic     Anxiety     Gastroesophageal reflux disease without esophagitis 3/2/2020    Hemorrhoids     History of gastroesophageal reflux (GERD)     Hyperlipidemia     Last assessed 3/24/2014     Sleep apnea     insurance not covering       Past Surgical History:   Procedure Laterality Date    COLONOSCOPY      [place] PALATOPHAYNGOPLASTY N/A 3/13/2020    Procedure: UVULOPALATOPHARYNGOPLASTY (UPPP); Surgeon: Sowmya Sanders MD;  Location:  MAIN OR;  Service: ENT       Family History   Problem Relation Age of Onset    Hypertension Mother     No Known Problems Father     COPD Maternal Uncle     No Known Problems Sister     No Known Problems Brother        Current Outpatient Medications   Medication Sig Dispense Refill    ibuprofen (MOTRIN) 600 mg tablet Take 1 tablet (600 mg total) by mouth every 6 (six) hours as needed for mild pain 30 tablet 0    omeprazole (PriLOSEC) 40 MG capsule Take 1 capsule (40 mg total) by mouth daily (Patient not taking: Reported on 11/25/2020) 30 capsule 3    triamcinolone (KENALOG) 0 5 % cream Apply topically 3 (three) times a day 30 g 0     No current facility-administered medications for this visit  No Known Allergies    Review of Systems   Review of Systems      As noted in HPI    The following portions of the patient's history were reviewed and updated as appropriate: allergies, current medications, past family history, past medical history, past social history, past surgical history and problem list     Objective      /82   Pulse 80   Temp 98 1 °F (36 7 °C) (Tympanic)   Ht 5' 7" (1 702 m)   Wt 89 kg (196 lb 4 8 oz)   SpO2 96%   BMI 30 74 kg/m²     Physical Exam  Constitutional:       General: He is not in acute distress  Appearance: He is obese  He is not ill-appearing, toxic-appearing or diaphoretic  HENT:      Head: Normocephalic and atraumatic  Right Ear: External ear normal       Left Ear: External ear normal       Nose: No nasal tenderness or rhinorrhea        Mouth/Throat:      Mouth: Mucous membranes are moist       Pharynx: Oropharynx is clear  Eyes:      General: No scleral icterus  Right eye: No discharge  Left eye: No discharge  Conjunctiva/sclera: Conjunctivae normal       Comments:   Absence uvula and tonsils status post surgery   Cardiovascular:      Rate and Rhythm: Normal rate and regular rhythm  Pulmonary:      Effort: Pulmonary effort is normal  No respiratory distress  Abdominal:      General: Abdomen is flat  Bowel sounds are normal       Palpations: Abdomen is soft  Tenderness: There is no abdominal tenderness  Musculoskeletal:         General: No deformity  Comments:  5/5 muscle strength bilateral upper and lower extremities, sensation to light touch intact bilateral upper and lower extremities   Skin:     General: Skin is warm and dry  Neurological:      Mental Status: He is alert and oriented to person, place, and time  Psychiatric:         Thought Content: Thought content normal              Some portions of this record may have been generated with voice recognition software  There may be translation, syntax, or grammatical errors  Occasional wrong word or "sound-a-like" substitutions may have occurred due to the inherent limitations of the voice recognition software  Read the chart carefully and recognize, using context, where substations may have occurred  If you have any questions, please contact the dictating provider for clarification or correction, as needed

## 2021-03-02 ENCOUNTER — OFFICE VISIT (OUTPATIENT)
Dept: FAMILY MEDICINE CLINIC | Facility: CLINIC | Age: 56
End: 2021-03-02
Payer: COMMERCIAL

## 2021-03-02 VITALS
HEART RATE: 80 BPM | HEIGHT: 67 IN | BODY MASS INDEX: 30.81 KG/M2 | SYSTOLIC BLOOD PRESSURE: 120 MMHG | OXYGEN SATURATION: 96 % | TEMPERATURE: 98.1 F | DIASTOLIC BLOOD PRESSURE: 82 MMHG | WEIGHT: 196.3 LBS

## 2021-03-02 DIAGNOSIS — Z98.890 HISTORY OF EAR, NOSE, AND THROAT (ENT) SURGERY: ICD-10-CM

## 2021-03-02 DIAGNOSIS — E78.5 DYSLIPIDEMIA: ICD-10-CM

## 2021-03-02 DIAGNOSIS — Z13.1 SCREENING FOR DIABETES MELLITUS (DM): Primary | ICD-10-CM

## 2021-03-02 DIAGNOSIS — Z86.010 HISTORY OF COLON POLYPS: ICD-10-CM

## 2021-03-02 DIAGNOSIS — M79.652 PAIN OF LEFT THIGH: ICD-10-CM

## 2021-03-02 DIAGNOSIS — Z87.19 HISTORY OF GASTROESOPHAGEAL REFLUX (GERD): ICD-10-CM

## 2021-03-02 DIAGNOSIS — Z00.00 PHYSICAL EXAM: ICD-10-CM

## 2021-03-02 PROCEDURE — 99396 PREV VISIT EST AGE 40-64: CPT | Performed by: FAMILY MEDICINE

## 2021-03-03 ENCOUNTER — LAB (OUTPATIENT)
Dept: LAB | Facility: HOSPITAL | Age: 56
End: 2021-03-03
Payer: COMMERCIAL

## 2021-03-03 ENCOUNTER — TRANSCRIBE ORDERS (OUTPATIENT)
Dept: ADMINISTRATIVE | Facility: HOSPITAL | Age: 56
End: 2021-03-03

## 2021-03-03 DIAGNOSIS — Z13.1 SCREENING FOR DIABETES MELLITUS: ICD-10-CM

## 2021-03-03 DIAGNOSIS — K75.81 STEATOHEPATITIS: ICD-10-CM

## 2021-03-03 DIAGNOSIS — R79.89 ABNORMAL LFTS: ICD-10-CM

## 2021-03-03 DIAGNOSIS — E78.5 HYPERLIPIDEMIA, UNSPECIFIED HYPERLIPIDEMIA TYPE: Primary | ICD-10-CM

## 2021-03-03 DIAGNOSIS — E78.5 HYPERLIPIDEMIA, UNSPECIFIED HYPERLIPIDEMIA TYPE: ICD-10-CM

## 2021-03-03 DIAGNOSIS — E78.00 HYPERCHOLESTEREMIA: ICD-10-CM

## 2021-03-03 LAB
ALBUMIN SERPL BCP-MCNC: 3.9 G/DL (ref 3.5–5)
ALP SERPL-CCNC: 93 U/L (ref 46–116)
ALT SERPL W P-5'-P-CCNC: 117 U/L (ref 12–78)
ANION GAP SERPL CALCULATED.3IONS-SCNC: 5 MMOL/L (ref 4–13)
AST SERPL W P-5'-P-CCNC: 39 U/L (ref 5–45)
BILIRUB SERPL-MCNC: 1.3 MG/DL (ref 0.2–1)
BUN SERPL-MCNC: 14 MG/DL (ref 5–25)
CALCIUM SERPL-MCNC: 8.6 MG/DL (ref 8.3–10.1)
CHLORIDE SERPL-SCNC: 103 MMOL/L (ref 100–108)
CHOLEST SERPL-MCNC: 228 MG/DL (ref 50–200)
CO2 SERPL-SCNC: 30 MMOL/L (ref 21–32)
CREAT SERPL-MCNC: 1.12 MG/DL (ref 0.6–1.3)
EST. AVERAGE GLUCOSE BLD GHB EST-MCNC: 117 MG/DL
GFR SERPL CREATININE-BSD FRML MDRD: 73 ML/MIN/1.73SQ M
GLUCOSE P FAST SERPL-MCNC: 100 MG/DL (ref 65–99)
HBA1C MFR BLD: 5.7 %
HDLC SERPL-MCNC: 32 MG/DL
LDLC SERPL CALC-MCNC: 167 MG/DL (ref 0–100)
POTASSIUM SERPL-SCNC: 3.9 MMOL/L (ref 3.5–5.3)
PROT SERPL-MCNC: 7.6 G/DL (ref 6.4–8.2)
SODIUM SERPL-SCNC: 138 MMOL/L (ref 136–145)
TRIGL SERPL-MCNC: 147 MG/DL

## 2021-03-03 PROCEDURE — 83036 HEMOGLOBIN GLYCOSYLATED A1C: CPT | Performed by: STUDENT IN AN ORGANIZED HEALTH CARE EDUCATION/TRAINING PROGRAM

## 2021-03-03 PROCEDURE — 80053 COMPREHEN METABOLIC PANEL: CPT

## 2021-03-03 PROCEDURE — 36415 COLL VENOUS BLD VENIPUNCTURE: CPT | Performed by: STUDENT IN AN ORGANIZED HEALTH CARE EDUCATION/TRAINING PROGRAM

## 2021-03-03 PROCEDURE — 80061 LIPID PANEL: CPT

## 2021-03-05 ENCOUNTER — TELEPHONE (OUTPATIENT)
Dept: FAMILY MEDICINE CLINIC | Facility: CLINIC | Age: 56
End: 2021-03-05

## 2021-03-05 NOTE — TELEPHONE ENCOUNTER
Attempt to call patient to discuss recent lab results and recommendation to start statin, no response, message left  Will message front staff to have patient scheduled for virtual visit or physical follow-up to discuss and establish with new PCP      The 10-year ASCVD risk score (Jean Marie Yao et al , 2013) is: 9 2%    Values used to calculate the score:      Age: 63 years      Sex: Male      Is Non- : No      Diabetic: No      Tobacco smoker: No      Systolic Blood Pressure: 694 mmHg      Is BP treated: No      HDL Cholesterol: 32 mg/dL      Total Cholesterol: 228 mg/dL

## 2021-03-05 NOTE — TELEPHONE ENCOUNTER
----- Message from Drexel Frankel, DO sent at 3/5/2021  9:07 AM EST -----    May please schedule this patient for a virtual appointment or if he prefers to follow-up in office next week with Dr Kosta Rios,  to establish as PCP and discuss recent results including hemoglobin A1c and lipid panel and recommendation to potentially start statin, please call patient with appointment time thank you  Attempted to call patient this morning no response, message left

## 2021-03-09 ENCOUNTER — OFFICE VISIT (OUTPATIENT)
Dept: FAMILY MEDICINE CLINIC | Facility: CLINIC | Age: 56
End: 2021-03-09
Payer: COMMERCIAL

## 2021-03-09 VITALS
SYSTOLIC BLOOD PRESSURE: 108 MMHG | HEIGHT: 67 IN | DIASTOLIC BLOOD PRESSURE: 70 MMHG | OXYGEN SATURATION: 97 % | RESPIRATION RATE: 16 BRPM | TEMPERATURE: 98.6 F | HEART RATE: 87 BPM | WEIGHT: 201 LBS | BODY MASS INDEX: 31.55 KG/M2

## 2021-03-09 DIAGNOSIS — Z71.2 ENCOUNTER TO DISCUSS TEST RESULTS: Primary | ICD-10-CM

## 2021-03-09 DIAGNOSIS — R73.09 ELEVATED HEMOGLOBIN A1C: ICD-10-CM

## 2021-03-09 DIAGNOSIS — E78.2 MIXED HYPERLIPIDEMIA: ICD-10-CM

## 2021-03-09 DIAGNOSIS — S01.21XA: ICD-10-CM

## 2021-03-09 DIAGNOSIS — R79.89 ABNORMAL LFTS: ICD-10-CM

## 2021-03-09 DIAGNOSIS — E66.09 CLASS 1 OBESITY DUE TO EXCESS CALORIES WITHOUT SERIOUS COMORBIDITY WITH BODY MASS INDEX (BMI) OF 31.0 TO 31.9 IN ADULT: ICD-10-CM

## 2021-03-09 DIAGNOSIS — R73.9 BLOOD GLUCOSE ELEVATED: ICD-10-CM

## 2021-03-09 PROCEDURE — 3008F BODY MASS INDEX DOCD: CPT | Performed by: FAMILY MEDICINE

## 2021-03-09 PROCEDURE — 1036F TOBACCO NON-USER: CPT | Performed by: FAMILY MEDICINE

## 2021-03-09 PROCEDURE — 99213 OFFICE O/P EST LOW 20 MIN: CPT | Performed by: FAMILY MEDICINE

## 2021-03-09 NOTE — PROGRESS NOTES
Assessment/Plan:    No problem-specific Assessment & Plan notes found for this encounter  Diagnoses and all orders for this visit:    Encounter to discuss test results  · Test results were discussed and reviewed and all questions were answered  Laceration of nasal septum with complication  -     sodium chloride (OCEAN) 0 65 % nasal spray; 1 spray into each nostril as needed for congestion or rhinitis  -     Ambulatory Referral to Otolaryngology; Future  · Do not touch scab or remove it, allow it to heal  Do not apply neomycin any longer  Follow up with ENT  Blood glucose elevated  · Lifestyle changes and modifications reinforced, spoke about healthy diet and exercise  · Recommended weight loss as first-line treatment for this elevated blood glucose  · Return in 6 months for follow-up with these abnormal elevated fasting glucose in prediabetic range  Abnormal LFTs  · Patient has had these elevated for quite some time now and has had workup by GI but given that he lost his health insurance last year he was unable to follow-up with them  He reports now that he has a follow-up with GI coming up  Mixed hyperlipidemia  · ASCVD risk calculated at 7 7%; statin is recommended given 10 year CV risk >7 5% but given elevated LFT's will hold on this medication and recommend lifestyle changes and modifications to diet and exercise  · Per USPSTF recommends discussing with patients benefits and harms of initiating low-moderate statin given that they concluded this will have a small net benefit  · Will follow up in 6 months  Class 1 obesity due to excess calories without serious comorbidity with body mass index (BMI) of 31 0 to 31 9 in adult    Elevated hemoglobin A1c  ·     See elevated blood glucose for plan  Subjective:      Patient ID: Lisa Rosales is a 63 y o  male  Patient comes in for her lab results discussion and review and interpretation    He also reports that he has had a laceration in his left nose with associated pain and bleeding  He has been applying neosporin  He reports that in the past he had ENT surgery in 3/14/2020 uvula/tonsils removal        Review of Systems      Objective:      /70 (BP Location: Left arm, Patient Position: Sitting, Cuff Size: Adult)   Pulse 87   Temp 98 6 °F (37 °C) (Tympanic)   Resp 16   Ht 5' 7" (1 702 m)   Wt 91 2 kg (201 lb)   SpO2 97%   BMI 31 48 kg/m²          Physical Exam  Vitals signs reviewed  Constitutional:       General: He is awake  He is not in acute distress  HENT:      Nose:      Comments:  Laceration noted in nasal septum and left nasal turbinate  Covered with dry blood, no discharge, no pus noted, no erythema  Cardiovascular:      Rate and Rhythm: Normal rate and regular rhythm  Heart sounds: Normal heart sounds, S1 normal and S2 normal    Pulmonary:      Effort: Pulmonary effort is normal       Breath sounds: Normal breath sounds  No decreased breath sounds, wheezing, rhonchi or rales  Neurological:      Mental Status: He is alert  Psychiatric:         Behavior: Behavior is cooperative

## 2021-03-11 ENCOUNTER — TELEPHONE (OUTPATIENT)
Dept: FAMILY MEDICINE CLINIC | Facility: CLINIC | Age: 56
End: 2021-03-11

## 2021-03-11 DIAGNOSIS — R73.03 PREDIABETES: ICD-10-CM

## 2021-03-11 DIAGNOSIS — Z78.9 NEED FOR FOLLOW-UP BY SOCIAL WORKER: ICD-10-CM

## 2021-03-11 DIAGNOSIS — R74.01 ELEVATED ALANINE AMINOTRANSFERASE (ALT) LEVEL: Primary | ICD-10-CM

## 2021-03-12 NOTE — TELEPHONE ENCOUNTER
Called patient to discuss results of hemoglobin A1c and lipid panel  The 10-year ASCVD risk score (Tracey Yost et al , 2013) is: 7 7%    Values used to calculate the score:      Age: 63 years      Sex: Male      Is Non- : No      Diabetic: No      Tobacco smoker: No      Systolic Blood Pressure: 002 mmHg      Is BP treated: No      HDL Cholesterol: 32 mg/dL      Total Cholesterol: 228 mg/dL      Counseled patient about nutrition and exercise  Patient would benefit from statin  However, did not yet initiate due to history of elevated ALTs  Referral placed for Gastroenterology

## 2021-03-17 ENCOUNTER — PATIENT OUTREACH (OUTPATIENT)
Dept: FAMILY MEDICINE CLINIC | Facility: CLINIC | Age: 56
End: 2021-03-17

## 2021-03-17 NOTE — PROGRESS NOTES
JOHN ORTIZ received consult from provider Dr Hamilton Mi due to patient needing assistance with insurance and appointment with GI  JOHN ORTIZ called patient ([phone])  JOHN ORTIZ introduced role and completed assessment  JOHN ORTIZ confirmed patient's contact information and address  Patient is  and unemployed  Patient stated that his wife is currently receiving unemployment and looking for a job  Patient does not receive any government assistance  Patient owns his home  Patient stated that he utilized a RW and SPC as needed  Patient stated that he will be starting OP PT soon  Patient stated that he is able to perform all ADLs independently  Patient's wife drives and transports as needed  Patient considers his wife his support  No MH hx and no substance abuse hx  JOHN ORTIZ confirmed patient's insurance  Patient stated that he recently switched insurance and is unsure if [address] [place] [address] takes his insurance  JOHN ORTIZ conference called [address] [place] [address] ([phone]) and spoke with Levi  Levi confirmed that GI does accept his insurance, patient already has appointment scheduled for 3/22/21  Patient had no other questions, concerns or needs  JOHN ORTIZ provided patient with JOHN ORTIZ contact information for any future needs

## 2021-03-29 ENCOUNTER — IMMUNIZATIONS (OUTPATIENT)
Dept: FAMILY MEDICINE CLINIC | Facility: HOSPITAL | Age: 56
End: 2021-03-29

## 2021-03-29 DIAGNOSIS — Z23 ENCOUNTER FOR IMMUNIZATION: Primary | ICD-10-CM

## 2021-03-29 PROCEDURE — 0001A SARS-COV-2 / COVID-19 MRNA VACCINE (PFIZER-BIONTECH) 30 MCG: CPT

## 2021-03-29 PROCEDURE — 91300 SARS-COV-2 / COVID-19 MRNA VACCINE (PFIZER-BIONTECH) 30 MCG: CPT

## 2021-04-06 ENCOUNTER — OFFICE VISIT (OUTPATIENT)
Dept: URGENT CARE | Facility: CLINIC | Age: 56
End: 2021-04-06
Payer: COMMERCIAL

## 2021-04-06 VITALS
WEIGHT: 199 LBS | SYSTOLIC BLOOD PRESSURE: 133 MMHG | DIASTOLIC BLOOD PRESSURE: 85 MMHG | TEMPERATURE: 96 F | BODY MASS INDEX: 31.23 KG/M2 | RESPIRATION RATE: 18 BRPM | HEIGHT: 67 IN | HEART RATE: 71 BPM | OXYGEN SATURATION: 99 %

## 2021-04-06 DIAGNOSIS — H65.192 OTHER NON-RECURRENT ACUTE NONSUPPURATIVE OTITIS MEDIA OF LEFT EAR: Primary | ICD-10-CM

## 2021-04-06 PROCEDURE — 99213 OFFICE O/P EST LOW 20 MIN: CPT | Performed by: PHYSICIAN ASSISTANT

## 2021-04-06 RX ORDER — AMOXICILLIN 500 MG/1
500 CAPSULE ORAL EVERY 8 HOURS SCHEDULED
Qty: 21 CAPSULE | Refills: 0 | Status: SHIPPED | OUTPATIENT
Start: 2021-04-06 | End: 2021-04-13

## 2021-04-06 NOTE — PATIENT INSTRUCTIONS
Left Otitis Media  rx amoxicillin twice daily x 7 days  Tylenol/ibuprofen as needed for pain  Warm compress to area    Follow up with PCP in 3-5 days  Proceed to  ER if symptoms worsen  Otitis Media, Ambulatory Care   GENERAL INFORMATION:   Otitis media  is an ear infection  Common symptoms include the following:   · Fever or a headache    · Ear pain    · Trouble hearing    · Ear feels plugged or full or you have ringing or buzzing in your ear    · Dizziness or you lose your balance    · Nausea or vomiting  Seek immediate care for the following symptoms:   · Seizure    · Fever and a stiff neck  Treatment for otitis media  may include any of the following:  · NSAIDs  help decrease swelling and pain or fever  This medicine is available with or without a doctor's order  NSAIDs can cause stomach bleeding or kidney problems in certain people  If you take blood thinner medicine, always ask your healthcare provider if NSAIDs are safe for you  Always read the medicine label and follow directions  · Ear drops  to help treat your ear pain  · Antibiotics  to help kill the germs that caused your ear infection  Care for otitis media:   · Use heat  Place a warm, moist washcloth on your ear to decrease pain  Apply for 15 to 20 minutes, 3 to 4 times a day    · Use ice  Ice helps decrease swelling and pain  Use an ice pack or put crushed ice in a plastic bag  Cover the ice pack with a towel and place it on your ear for 15 to 20 minutes, 3 to 4 times a day for 2 days  Prevent otitis media:   · Wash your hands often  This will help prevent the spread of germs  Encourage everyone in your house to wash their hands with soap and water after they use the bathroom  Everyone should also wash their hands after they change a child's diaper and before they prepare or eat food  · Stay away from people who are ill  Germs are easily and quickly spread through contact    Follow up with your healthcare provider as directed: Write down your questions so you remember to ask them during your visits  CARE AGREEMENT:   You have the right to help plan your care  Learn about your health condition and how it may be treated  Discuss treatment options with your caregivers to decide what care you want to receive  You always have the right to refuse treatment  The above information is an  only  It is not intended as medical advice for individual conditions or treatments  Talk to your doctor, nurse or pharmacist before following any medical regimen to see if it is safe and effective for you  © 2014 [address] is for End User's use only and may not be sold, redistributed or otherwise used for commercial purposes  All illustrations and images included in CareNotes® are the copyrighted property of [employer]  or Thanh [employer]

## 2021-04-06 NOTE — PROGRESS NOTES
[facility]        NAME: Omega Lefort is a 63 y o  male  : 1965    MRN: [id number]  DATE: 2021  TIME: 8:42 AM    Assessment and Plan   Other non-recurrent acute nonsuppurative otitis media of left ear [H65 192]  1  Other non-recurrent acute nonsuppurative otitis media of left ear       Patient Instructions   Left Otitis Media  rx amoxicillin twice daily x 7 days  Tylenol/ibuprofen as needed for pain  Warm compress to area    Follow up with PCP in 3-5 days  Proceed to  ER if symptoms worsen  Chief Complaint     Chief Complaint   Patient presents with    Otitis Media     left ear pain radiating up and down through jaw since yesterday         History of Present Illness       Makenna Lala   Is a 79-year-old male presents to clinic complaining of left ear pain x2 days  He states that started yesterday morning and  Is worse today  He describes the pain as  Sharp and constant  He also notes tenderness in front of ear, behind L ear, and going down his neck on the left side as well  He denies any tinnitus or discharge from the ear  He denies any fever, chills, nasal congestion, sore throat, cough, shortness of breath, fatigue, or myalgias  Review of Systems   Review of Systems   Constitutional: Negative for chills, fatigue and fever  HENT: Positive for ear pain  Negative for congestion, ear discharge, hearing loss, sinus pressure, sinus pain and sore throat  Respiratory: Negative for cough and shortness of breath  Musculoskeletal: Negative for myalgias  Neurological: Negative for headaches       Current Medications       Current Outpatient Medications:     ibuprofen (MOTRIN) 600 mg tablet, Take 1 tablet (600 mg total) by mouth every 6 (six) hours as needed for mild pain (Patient not taking: Reported on 3/9/2021), Disp: 30 tablet, Rfl: 0    omeprazole (PriLOSEC) 40 MG capsule, Take 1 capsule (40 mg total) by mouth daily (Patient not taking: Reported on 2021), Disp: 30 capsule, Rfl: 3    sodium chloride (OCEAN) 0 65 % nasal spray, 1 spray into each nostril as needed for congestion or rhinitis (Patient not taking: Reported on 4/6/2021), Disp: 30 mL, Rfl: 0    triamcinolone (KENALOG) 0 5 % cream, Apply topically 3 (three) times a day (Patient not taking: Reported on 3/2/2021), Disp: 30 g, Rfl: 0    Current Allergies     Allergies as of 04/06/2021    (No Known Allergies)            The following portions of the patient's history were reviewed and updated as appropriate: allergies, current medications, past family history, past medical history, past social history, past surgical history and problem list      Past Medical History:   Diagnosis Date    Abnormal weight gain     Last assessed 3/24/2014    Allergic     Anxiety     Gastroesophageal reflux disease without esophagitis 3/2/2020    Hemorrhoids     History of gastroesophageal reflux (GERD)     Hyperlipidemia     Last assessed 3/24/2014     Sleep apnea     insurance not covering       Past Surgical History:   Procedure Laterality Date    COLONOSCOPY      [place] PALATOPHAYNGOPLASTY N/A 3/13/2020    Procedure: UVULOPALATOPHARYNGOPLASTY (UPPP); Surgeon: Meghna Mendes MD;  [facility];  Service: ENT    TONSILLECTOMY  03/16/2020    UVULECTOMY  03/16/2020       Family History   Problem Relation Age of Onset    Hypertension Mother     No Known Problems Father     COPD Maternal Uncle     No Known Problems Sister     No Known Problems Brother          Medications have been verified  Objective   /85   Pulse 71   Temp (!) 96 °F (35 6 °C)   Resp 18   Ht 5' 7" (1 702 m)   Wt 90 3 kg (199 lb)   SpO2 99%   BMI 31 17 kg/m²   No LMP for male patient  Physical Exam     Physical Exam  Vitals signs and nursing note reviewed  Constitutional:       General: He is not in acute distress  Appearance: Normal appearance  He is not ill-appearing     HENT:      Right Ear: Hearing, tympanic membrane, ear canal and external ear normal       Left Ear: Hearing, ear canal and external ear normal  Tympanic membrane is erythematous  Cardiovascular:      Rate and Rhythm: Normal rate and regular rhythm  Heart sounds: Normal heart sounds  Pulmonary:      Effort: Pulmonary effort is normal       Breath sounds: Normal breath sounds  Lymphadenopathy:      Cervical: Cervical adenopathy present  Neurological:      Mental Status: He is alert and oriented to person, place, and time     Psychiatric:         Mood and Affect: Mood normal          Behavior: Behavior normal

## 2021-04-08 ENCOUNTER — OFFICE VISIT (OUTPATIENT)
Dept: OTOLARYNGOLOGY | Facility: CLINIC | Age: 56
End: 2021-04-08
Payer: COMMERCIAL

## 2021-04-08 VITALS — WEIGHT: 196 LBS | BODY MASS INDEX: 30.76 KG/M2 | HEIGHT: 67 IN | TEMPERATURE: 98 F

## 2021-04-08 DIAGNOSIS — J34.89 NASAL DRYNESS: ICD-10-CM

## 2021-04-08 DIAGNOSIS — M26.622 ARTHRALGIA OF LEFT TEMPOROMANDIBULAR JOINT: ICD-10-CM

## 2021-04-08 DIAGNOSIS — G47.33 OSA (OBSTRUCTIVE SLEEP APNEA): Primary | ICD-10-CM

## 2021-04-08 PROCEDURE — 1036F TOBACCO NON-USER: CPT | Performed by: OTOLARYNGOLOGY

## 2021-04-08 PROCEDURE — 99244 OFF/OP CNSLTJ NEW/EST MOD 40: CPT | Performed by: OTOLARYNGOLOGY

## 2021-04-08 NOTE — PROGRESS NOTES
[facility]  [facility]  [facility]      Otolaryngology -- Head and Neck Surgery New Patient Visit    Sourav Martin is a 63 y o  who presents with a chief complaint of ear pain, nasal issue, geovani    Pertinent elements of the history include:  He has a history of obstructive sleep apnea  He had a UP3 about 1 year ago but continues to experience symptoms including unrestful sleep morning headache and daytime somnolence  He has had observed apneas as well since his surgery  Additionally he is bothered by dryness in the left nostril  This has been present for years  It crusts and becomes irritated  He does not use any medication for it at this time  Additionally he is also bothered by recurrent left-sided otalgia and told recently that he had an ear infection  Pain localizes to the pre-tragal region and radiates down the jaw  He was placed on amoxicillin for this  Review of systems: Pertinent review of systems documented in the HPI  10 point ROS documented in a separate note, as necessary  Results reviewed; images from any scan have been personally reviewed: The past medical, surgical, social and family history have been reviewed as documented in today's record  Past Medical History:   Diagnosis Date    Abnormal weight gain     Last assessed 3/24/2014    Allergic     Anxiety     Gastroesophageal reflux disease without esophagitis 3/2/2020    Hemorrhoids     History of gastroesophageal reflux (GERD)     Hyperlipidemia     Last assessed 3/24/2014     Sleep apnea     insurance not covering       Past Surgical History:   Procedure Laterality Date    COLONOSCOPY      NC PALATOPHAYNGOPLASTY N/A 3/13/2020    Procedure: UVULOPALATOPHARYNGOPLASTY (UPPP);   Surgeon: Breonna Shaikh MD;  Location: [facility];  Service: ENT    TONSILLECTOMY  03/16/2020    UVULECTOMY  03/16/2020       Family History   Problem Relation Age of Onset    Hypertension Mother     No Known Problems Father     COPD Maternal Uncle     No Known Problems Sister     No Known Problems Brother        Social History     Socioeconomic History    Marital status: /Civil Union     Spouse name: Not on file    Number of children: Not on file    Years of education: Not on file    Highest education level: Not on file   Occupational History    Not on file   Social Needs    Financial resource strain: Not hard at all   [employer]le[employer] insecurity     Worry: Never true     Inability: Never true   [employer] needs     Medical: No     Non-medical: No   Tobacco Use    Smoking status: Never Smoker    Smokeless tobacco: Never Used   Substance and Sexual Activity    Alcohol use: No    Drug use: No    Sexual activity: Yes     Comment: Sexually active, high risk - As per [employer]    Lifestyle    Physical activity     Days per week: Not on file     Minutes per session: Not on file    Stress: Not on file   Relationships    Social connections     Talks on phone: More than three times a week     Gets together: More than three times a week     Attends Judaism service: Not on file     Active member of club or organization: Not on file     Attends meetings of clubs or organizations: Not on file     Relationship status: Not on file    Intimate partner violence     Fear of current or ex partner: Not on file     Emotionally abused: Not on file     Physically abused: Not on file     Forced sexual activity: Not on file   Other Topics Concern    Not on file   Social History Narrative    Not on file       Current Outpatient Medications on File Prior to Visit   Medication Sig Dispense Refill    amoxicillin (AMOXIL) 500 mg capsule Take 1 capsule (500 mg total) by mouth every 8 (eight) hours for 7 days 21 capsule 0    ibuprofen (MOTRIN) 600 mg tablet Take 1 tablet (600 mg total) by mouth every 6 (six) hours as needed for mild pain 30 tablet 0    omeprazole (PriLOSEC) 40 MG capsule Take 1 capsule (40 mg total) by mouth daily 30 capsule 3    sodium chloride (OCEAN) 0 65 % nasal spray 1 spray into each nostril as needed for congestion or rhinitis 30 mL 0    triamcinolone (KENALOG) 0 5 % cream Apply topically 3 (three) times a day 30 g 0     No current facility-administered medications on file prior to visit  Physical exam:   Temp 98 °F (36 7 °C) (Temporal)   Ht 5' 7" (1 702 m)   Wt 88 9 kg (196 lb)   BMI 30 70 kg/m²     Constitutional:  Well developed, well nourished and groomed, in no acute distress  Eyes:  Extra-ocular movements intact, pupils equally round and reactive to light and accommodation, the lids and conjunctivae are normal in appearance  Head: Atraumatic, normocephalic, no visible scalp lesions, bony palpation unremarkable without stepoffs, parotid and submandibular salivary glands non-tender to palpation and without masses bilaterally  Ears:  Auricles normal in appearance bilaterally, mastoid prominence non-tender, external auditory canals clear bilaterally  Tympanic membranes intact  Normal appearing ossicles  Left TMJ TTP  Nose/Sinuses:  External appearance unremarkable, no maxillary or frontal sinus tenderness to palpation bilaterally  Anterior rhinoscopy reveals: The left anterior nasal septum is dry and excoriated without any concerning lesion     Oral Cavity:  Moist mucus membranes, gums and dentition unremarkable, no oral mucosal masses or lesions, floor of mouth soft, tongue mobile without masses or lesions  Oropharynx:  Base of tongue soft and without masses, tonsils bilaterally unremarkable, soft palate mucosa unremarkable  Evidence of prior palatoplasty     Neck:  No visible or palpable cervical lesions or lymphadenopathy, thyroid gland is normal in size and symmetry and without masses, normal laryngeal elevation with swallowing  Cardiovascular:  Normal rate and rhythm, no palpable thrills, no jugulovenous distension observed    Respiratory: Normal respiratory effort without evidence of retractions or use of accessory muscles  Integument:  Normal appearing without observed masses or lesions  Neurologic:  Cranial nerves II-XII intact bilaterally  Psychiatric:  Alert and oriented to time, place and person, normal affect  Procedures        Assessment:   1  MARIO (obstructive sleep apnea)  Home Study   2  Arthralgia of left temporomandibular joint     3  Nasal dryness         Orders  Orders Placed This Encounter   Procedures    Home Study     Standing Status:   Future     Standing Expiration Date:   4/8/2025     Order Specific Question:   Exclusions: Answer:   None of the above     Order Specific Question:   Sleep History/Symptoms: (Must have at least one other symptoms than snoring )     Answer:   Snoring     Order Specific Question:   Sleep History/Symptoms: (Must have at least one other symptoms than snoring )     Answer: Witnessed apneas     Order Specific Question:   Sleep History/Symptoms: (Must have at least one other symptoms than snoring )     Answer:   Chronic or AM headache     Order Specific Question:   Sleep History/Symptoms: (Must have at least one other symptoms than snoring )     Answer: Witnessed gasping     Order Specific Question:   Sleep History/Symptoms: (Must have at least one other symptoms than snoring )     Answer:   Unrefreshing sleep     Order Specific Question:   Service requested:     Answer:   Referring provider to provide patient f/u         Discussion/Plan:    1  He has concern for ongoing obstructive sleep apnea  I ordered a sleep study  He will follow-up after the study  2  He has ongoing left-sided nasal dryness  For this I recommended Ponaris  He should apply this several times per day throughout the year  3  His left-sided otalgia is due to TMJ arthralgia  There is no evidence of an ear infection  I recommended he stop his antibiotics and gave him TMJ instructions    He may need to see oral surgery because his lower jaw does extend anteriorly when he opens his mouth, an unusual finding  This may be causing his symptoms

## 2021-04-20 ENCOUNTER — IMMUNIZATIONS (OUTPATIENT)
Dept: FAMILY MEDICINE CLINIC | Facility: HOSPITAL | Age: 56
End: 2021-04-20

## 2021-04-20 DIAGNOSIS — Z23 ENCOUNTER FOR IMMUNIZATION: Primary | ICD-10-CM

## 2021-04-20 PROCEDURE — 0002A SARS-COV-2 / COVID-19 MRNA VACCINE (PFIZER-BIONTECH) 30 MCG: CPT

## 2021-04-20 PROCEDURE — 91300 SARS-COV-2 / COVID-19 MRNA VACCINE (PFIZER-BIONTECH) 30 MCG: CPT

## 2021-04-30 ENCOUNTER — OFFICE VISIT (OUTPATIENT)
Dept: GASTROENTEROLOGY | Facility: CLINIC | Age: 56
End: 2021-04-30
Payer: COMMERCIAL

## 2021-04-30 VITALS
HEART RATE: 90 BPM | HEIGHT: 67 IN | WEIGHT: 196 LBS | TEMPERATURE: 97.8 F | SYSTOLIC BLOOD PRESSURE: 146 MMHG | DIASTOLIC BLOOD PRESSURE: 91 MMHG | BODY MASS INDEX: 30.76 KG/M2

## 2021-04-30 DIAGNOSIS — R74.01 ELEVATED ALANINE AMINOTRANSFERASE (ALT) LEVEL: ICD-10-CM

## 2021-04-30 DIAGNOSIS — D12.6 TUBULAR ADENOMA OF COLON: ICD-10-CM

## 2021-04-30 DIAGNOSIS — R79.89 ABNORMAL LFTS: ICD-10-CM

## 2021-04-30 DIAGNOSIS — K75.81 STEATOHEPATITIS: Primary | ICD-10-CM

## 2021-04-30 DIAGNOSIS — K21.9 GASTROESOPHAGEAL REFLUX DISEASE WITHOUT ESOPHAGITIS: ICD-10-CM

## 2021-04-30 PROCEDURE — 3008F BODY MASS INDEX DOCD: CPT | Performed by: OTOLARYNGOLOGY

## 2021-04-30 PROCEDURE — 99214 OFFICE O/P EST MOD 30 MIN: CPT | Performed by: INTERNAL MEDICINE

## 2021-04-30 NOTE — H&P (VIEW-ONLY)
[facility]  Progress Note - Sourav Martin 63 y o  male MRN: [id number]    Unit/Bed#:  Encounter: [id number]    Assessment/Plan:    1  Elevated LFTs with mixed pattern, mild elevation of transaminases now with mild elevation of total bilirubin as well  Suspect NAFLD  -  Iron / TIBC ratio is normal, ferritin is normal, celiac serologies are normal, anti smooth muscle antibody, AMA, TOMI are all normal   -  Chronic hepatitis panel is normal     -Would recommend checking hepatitis B surface antibody and hepatitis a total antibody- if not immune, would recommend vaccination by the PCP   -  Given worsening liver functions, it may be reasonable to proceed with liver biopsy at this time  Patient was recommended weight loss and dietary modifications last year however despite this, he has not had any significant improvement in his LFTs  - would also recommend MRI / MRCP given mildly cholestatic picture  To assess for any biliary strictures or stones that may be causing mild elevation of total bilirubin  And to both assess the liver parenchyma  Patient does report family history of liver disease in uncles- but thinks that this may have been secondary to alcohol  Patient does not drink any alcohol  Denies any hepatotoxic medications  2   GERD- EGD without any evidence of Scruggs's esophagus, but did show some gastritis, no evidence of H pylori, duodenal biopsies did not show increased intraepithelial lymphocytes however celiac serologies were unremarkable  Patient has done well with omeprazole 40 mg on daily basis, can continue this  Would recommend open Simona switching to Pepcid, patient reports that he will like to work on weight loss now, if   Acid reflux symptoms are controlled with weight loss, can consider switching to H2 blocker next visit  3  Colon cancer screening- history of tubular adenomas, repeat colonoscopy in 2 years  Subjective:      This is a 58-year-old male with history of tubular adenomas, hepatic steatosis, who presents for follow-up  Patient reports that his weight has been the same as last year  He denies any hematemesis, coffee-ground emesis or melena  No abdominal distention, peripheral edema or jaundice  Denies any abdominal pain  He does report occasional dryness in the mouth and nose  He has been seeing ENT for this  Patient underwent EGD and colonoscopy in 2020  EGD was notable for moderate gastritis with gastric biopsies being negative for H pylori and intestinal metaplasia  Duodenal biopsies had shown increase in intraepithelial lymphocytes however subsequent celiac serologies were unremarkable  He also underwent colonoscopy at the same time which was notable for several tubular adenomatous polyps  He was recommended a repeat at 3 year interval     Liver function tests most recent labs are again elevated with , AST 39, bilirubin of 1 3  Ultrasound last year showed moderate fatty changes within the liver  He reports that his acid reflux symptoms are mostly controlled with omeprazole 40 mg on daily basis  Denies any dysphagia, odynophagia, loss of appetite or early satiety  Objective:     Vitals: Blood pressure 146/91, pulse 90, temperature 97 8 °F (36 6 °C), height 5' 7" (1 702 m), weight 88 9 kg (196 lb)  ,Body mass index is 30 7 kg/m²  [unfilled]    Physical Exam:    GEN: wn/wd, NAD  HEENT: MMM, no cervical or supraclavicular LAD, anciteric  CV: RRR, no m/r/g  CHEST: CTA b/l, no w/r/r  ABD: +BS, soft, NT/ND, no hepatosplenomegaly  EXT: no c/c/e  SKIN: no rashes  NEURO: aaox3      Invasive Devices     None                         Lab, Imaging and other studies:     No visits with results within 1 Day(s) from this visit     Latest known visit with results is:   Lab on 03/03/2021   Component Date Value    Cholesterol 03/03/2021 228*    Triglycerides 03/03/2021 147     HDL, Direct 03/03/2021 32*    LDL Calculated 03/03/2021 167*  Sodium 03/03/2021 138     Potassium 03/03/2021 3 9     Chloride 03/03/2021 103     CO2 03/03/2021 30     ANION GAP 03/03/2021 5     BUN 03/03/2021 14     Creatinine 03/03/2021 1 12     Glucose, Fasting 03/03/2021 100*    Calcium 03/03/2021 8 6     AST 03/03/2021 39     ALT 03/03/2021 117*    Alkaline Phosphatase 03/03/2021 93     Total Protein 03/03/2021 7 6     Albumin 03/03/2021 3 9     Total Bilirubin 03/03/2021 1 30*    eGFR 03/03/2021 73          I have personally reviewed pertinent films in PACS    No current facility-administered medications for this visit

## 2021-04-30 NOTE — LETTER
April 30, 2021     Ava Heller, [address] [address]    Patient: Hugo Hardwick   YOB: 1965   Date of Visit: 4/30/2021       Dear Dr Kalyani Gongora: Thank you for referring Kitty Sicard to me for evaluation  Below are my notes for this consultation  If you have questions, please do not hesitate to call me  I look forward to following your patient along with you  Sincerely,        Lety Brown MD        CC: No Recipients  Lety Brown MD  4/30/2021  4:49 PM  Sign when Signing Visit  [facility]  Progress Note - Hugo Hardwick 63 y o  male MRN: [id number]    Unit/Bed#:  Encounter: [id number]    Assessment/Plan:    1  Elevated LFTs with mixed pattern, mild elevation of transaminases now with mild elevation of total bilirubin as well  Suspect NAFLD  -  Iron / TIBC ratio is normal, ferritin is normal, celiac serologies are normal, anti smooth muscle antibody, AMA, TOMI are all normal   -  Chronic hepatitis panel is normal     -Would recommend checking hepatitis B surface antibody and hepatitis a total antibody- if not immune, would recommend vaccination by the PCP   -  Given worsening liver functions, it may be reasonable to proceed with liver biopsy at this time  Patient was recommended weight loss and dietary modifications last year however despite this, he has not had any significant improvement in his LFTs  - would also recommend MRI / MRCP given mildly cholestatic picture  To assess for any biliary strictures or stones that may be causing mild elevation of total bilirubin  And to both assess the liver parenchyma  Patient does report family history of liver disease in uncles- but thinks that this may have been secondary to alcohol  Patient does not drink any alcohol  Denies any hepatotoxic medications      2   GERD- EGD without any evidence of Scruggs's esophagus, but did show some gastritis, no evidence of H pylori, duodenal biopsies did not show increased intraepithelial lymphocytes however celiac serologies were unremarkable  Patient has done well with omeprazole 40 mg on daily basis, can continue this  Would recommend open Simona switching to Pepcid, patient reports that he will like to work on weight loss now, if   Acid reflux symptoms are controlled with weight loss, can consider switching to H2 blocker next visit  3  Colon cancer screening- history of tubular adenomas, repeat colonoscopy in 2 years  Subjective: This is a 58-year-old male with history of tubular adenomas, hepatic steatosis, who presents for follow-up  Patient reports that his weight has been the same as last year  He denies any hematemesis, coffee-ground emesis or melena  No abdominal distention, peripheral edema or jaundice  Denies any abdominal pain  He does report occasional dryness in the mouth and nose  He has been seeing ENT for this  Patient underwent EGD and colonoscopy in 2020  EGD was notable for moderate gastritis with gastric biopsies being negative for H pylori and intestinal metaplasia  Duodenal biopsies had shown increase in intraepithelial lymphocytes however subsequent celiac serologies were unremarkable  He also underwent colonoscopy at the same time which was notable for several tubular adenomatous polyps  He was recommended a repeat at 3 year interval     Liver function tests most recent labs are again elevated with , AST 39, bilirubin of 1 3  Ultrasound last year showed moderate fatty changes within the liver  He reports that his acid reflux symptoms are mostly controlled with omeprazole 40 mg on daily basis  Denies any dysphagia, odynophagia, loss of appetite or early satiety  Objective:     Vitals: Blood pressure 146/91, pulse 90, temperature 97 8 °F (36 6 °C), height 5' 7" (1 702 m), weight 88 9 kg (196 lb)  ,Body mass index is 30 7 kg/m²      [unfilled]    Physical Exam:    GEN: wn/wd, NAD  HEENT: MMM, no cervical or supraclavicular LAD, anciteric  CV: RRR, no m/r/g  CHEST: CTA b/l, no w/r/r  ABD: +BS, soft, NT/ND, no hepatosplenomegaly  EXT: no c/c/e  SKIN: no rashes  NEURO: aaox3      Invasive Devices     None                         Lab, Imaging and other studies:     No visits with results within 1 Day(s) from this visit  Latest known visit with results is:   Lab on 03/03/2021   Component Date Value    Cholesterol 03/03/2021 228*    Triglycerides 03/03/2021 147     HDL, Direct 03/03/2021 32*    LDL Calculated 03/03/2021 167*    Sodium 03/03/2021 138     Potassium 03/03/2021 3 9     Chloride 03/03/2021 103     CO2 03/03/2021 30     ANION GAP 03/03/2021 5     BUN 03/03/2021 14     Creatinine 03/03/2021 1 12     Glucose, Fasting 03/03/2021 100*    Calcium 03/03/2021 8 6     AST 03/03/2021 39     ALT 03/03/2021 117*    Alkaline Phosphatase 03/03/2021 93     Total Protein 03/03/2021 7 6     Albumin 03/03/2021 3 9     Total Bilirubin 03/03/2021 1 30*    eGFR 03/03/2021 73          I have personally reviewed pertinent films in PACS    No current facility-administered medications for this visit

## 2021-04-30 NOTE — PROGRESS NOTES
[facility]  Progress Note - Sally Nicole 63 y o  male MRN: [id number]    Unit/Bed#:  Encounter: [id number]    Assessment/Plan:    1  Elevated LFTs with mixed pattern, mild elevation of transaminases now with mild elevation of total bilirubin as well  Suspect NAFLD  -  Iron / TIBC ratio is normal, ferritin is normal, celiac serologies are normal, anti smooth muscle antibody, AMA, TOMI are all normal   -  Chronic hepatitis panel is normal     -Would recommend checking hepatitis B surface antibody and hepatitis a total antibody- if not immune, would recommend vaccination by the PCP   -  Given worsening liver functions, it may be reasonable to proceed with liver biopsy at this time  Patient was recommended weight loss and dietary modifications last year however despite this, he has not had any significant improvement in his LFTs  - would also recommend MRI / MRCP given mildly cholestatic picture  To assess for any biliary strictures or stones that may be causing mild elevation of total bilirubin  And to both assess the liver parenchyma  Patient does report family history of liver disease in uncles- but thinks that this may have been secondary to alcohol  Patient does not drink any alcohol  Denies any hepatotoxic medications  2   GERD- EGD without any evidence of Scruggs's esophagus, but did show some gastritis, no evidence of H pylori, duodenal biopsies did not show increased intraepithelial lymphocytes however celiac serologies were unremarkable  Patient has done well with omeprazole 40 mg on daily basis, can continue this  Would recommend open Simona switching to Pepcid, patient reports that he will like to work on weight loss now, if   Acid reflux symptoms are controlled with weight loss, can consider switching to H2 blocker next visit  3  Colon cancer screening- history of tubular adenomas, repeat colonoscopy in 2 years  Subjective:      This is a 62-year-old male with history of tubular adenomas, hepatic steatosis, who presents for follow-up  Patient reports that his weight has been the same as last year  He denies any hematemesis, coffee-ground emesis or melena  No abdominal distention, peripheral edema or jaundice  Denies any abdominal pain  He does report occasional dryness in the mouth and nose  He has been seeing ENT for this  Patient underwent EGD and colonoscopy in 2020  EGD was notable for moderate gastritis with gastric biopsies being negative for H pylori and intestinal metaplasia  Duodenal biopsies had shown increase in intraepithelial lymphocytes however subsequent celiac serologies were unremarkable  He also underwent colonoscopy at the same time which was notable for several tubular adenomatous polyps  He was recommended a repeat at 3 year interval     Liver function tests most recent labs are again elevated with , AST 39, bilirubin of 1 3  Ultrasound last year showed moderate fatty changes within the liver  He reports that his acid reflux symptoms are mostly controlled with omeprazole 40 mg on daily basis  Denies any dysphagia, odynophagia, loss of appetite or early satiety  Objective:     Vitals: Blood pressure 146/91, pulse 90, temperature 97 8 °F (36 6 °C), height 5' 7" (1 702 m), weight 88 9 kg (196 lb)  ,Body mass index is 30 7 kg/m²  [unfilled]    Physical Exam:    GEN: wn/wd, NAD  HEENT: MMM, no cervical or supraclavicular LAD, anciteric  CV: RRR, no m/r/g  CHEST: CTA b/l, no w/r/r  ABD: +BS, soft, NT/ND, no hepatosplenomegaly  EXT: no c/c/e  SKIN: no rashes  NEURO: aaox3      Invasive Devices     None                         Lab, Imaging and other studies:     No visits with results within 1 Day(s) from this visit     Latest known visit with results is:   Lab on 03/03/2021   Component Date Value    Cholesterol 03/03/2021 228*    Triglycerides 03/03/2021 147     HDL, Direct 03/03/2021 32*    LDL Calculated 03/03/2021 167*  Sodium 03/03/2021 138     Potassium 03/03/2021 3 9     Chloride 03/03/2021 103     CO2 03/03/2021 30     ANION GAP 03/03/2021 5     BUN 03/03/2021 14     Creatinine 03/03/2021 1 12     Glucose, Fasting 03/03/2021 100*    Calcium 03/03/2021 8 6     AST 03/03/2021 39     ALT 03/03/2021 117*    Alkaline Phosphatase 03/03/2021 93     Total Protein 03/03/2021 7 6     Albumin 03/03/2021 3 9     Total Bilirubin 03/03/2021 1 30*    eGFR 03/03/2021 73          I have personally reviewed pertinent films in PACS    No current facility-administered medications for this visit

## 2021-05-03 ENCOUNTER — PREP FOR PROCEDURE (OUTPATIENT)
Dept: INTERVENTIONAL RADIOLOGY/VASCULAR | Facility: CLINIC | Age: 56
End: 2021-05-03

## 2021-05-03 DIAGNOSIS — R79.89 ABNORMAL LFTS: Primary | ICD-10-CM

## 2021-05-03 RX ORDER — SODIUM CHLORIDE 9 MG/ML
75 INJECTION, SOLUTION INTRAVENOUS CONTINUOUS
Status: CANCELLED | OUTPATIENT
Start: 2021-05-03

## 2021-05-11 ENCOUNTER — TELEPHONE (OUTPATIENT)
Dept: RADIOLOGY | Facility: HOSPITAL | Age: 56
End: 2021-05-11

## 2021-05-11 NOTE — NURSING NOTE
Left message for patient to call back [facility] at [phone] to review pre procedure instructions for liver biopsy scheduled May 17

## 2021-05-13 ENCOUNTER — TELEPHONE (OUTPATIENT)
Dept: RADIOLOGY | Facility: HOSPITAL | Age: 56
End: 2021-05-13

## 2021-05-13 DIAGNOSIS — K21.9 GASTROESOPHAGEAL REFLUX DISEASE WITHOUT ESOPHAGITIS: Primary | ICD-10-CM

## 2021-05-13 NOTE — NURSING NOTE
Spoke with patient regarding liver biopsy scheduled for Monday, May 17  Pt to arrive at 0900 in [facility] after midnight  Pt will need a  home  Pt aware Covid testing needs to be done within 6 days of procedure and labe work needs to be done  Explained procedure and recovery time in hospital of 4 hours  Pt verbalized understanding of all instructions

## 2021-05-14 ENCOUNTER — TRANSCRIBE ORDERS (OUTPATIENT)
Dept: ADMINISTRATIVE | Facility: HOSPITAL | Age: 56
End: 2021-05-14

## 2021-05-14 ENCOUNTER — TELEPHONE (OUTPATIENT)
Dept: SURGERY | Facility: HOSPITAL | Age: 56
End: 2021-05-14

## 2021-05-14 ENCOUNTER — APPOINTMENT (OUTPATIENT)
Dept: LAB | Facility: HOSPITAL | Age: 56
End: 2021-05-14
Attending: INTERNAL MEDICINE
Payer: COMMERCIAL

## 2021-05-14 DIAGNOSIS — R79.89 ABNORMAL LFTS: ICD-10-CM

## 2021-05-14 DIAGNOSIS — K75.81 STEATOHEPATITIS: Primary | ICD-10-CM

## 2021-05-14 DIAGNOSIS — K21.9 GASTROESOPHAGEAL REFLUX DISEASE WITHOUT ESOPHAGITIS: ICD-10-CM

## 2021-05-14 DIAGNOSIS — K75.81 STEATOHEPATITIS: ICD-10-CM

## 2021-05-14 LAB
ALBUMIN SERPL BCP-MCNC: 4 G/DL (ref 3.5–5)
ALP SERPL-CCNC: 96 U/L (ref 46–116)
ALT SERPL W P-5'-P-CCNC: 113 U/L (ref 12–78)
ANION GAP SERPL CALCULATED.3IONS-SCNC: 7 MMOL/L (ref 4–13)
AST SERPL W P-5'-P-CCNC: 46 U/L (ref 5–45)
BASOPHILS # BLD AUTO: 0.03 THOUSANDS/ΜL (ref 0–0.1)
BASOPHILS NFR BLD AUTO: 1 % (ref 0–1)
BILIRUB SERPL-MCNC: 0.92 MG/DL (ref 0.2–1)
BUN SERPL-MCNC: 15 MG/DL (ref 5–25)
CALCIUM SERPL-MCNC: 8.9 MG/DL (ref 8.3–10.1)
CHLORIDE SERPL-SCNC: 106 MMOL/L (ref 100–108)
CO2 SERPL-SCNC: 29 MMOL/L (ref 21–32)
CREAT SERPL-MCNC: 0.96 MG/DL (ref 0.6–1.3)
EOSINOPHIL # BLD AUTO: 0.08 THOUSAND/ΜL (ref 0–0.61)
EOSINOPHIL NFR BLD AUTO: 1 % (ref 0–6)
ERYTHROCYTE [DISTWIDTH] IN BLOOD BY AUTOMATED COUNT: 11.6 % (ref 11.6–15.1)
GFR SERPL CREATININE-BSD FRML MDRD: 87 ML/MIN/1.73SQ M
GLUCOSE P FAST SERPL-MCNC: 96 MG/DL (ref 65–99)
HAV AB SER QL IA: REACTIVE
HBV SURFACE AB SER-ACNC: 3.27 MIU/ML
HCT VFR BLD AUTO: 46.6 % (ref 36.5–49.3)
HGB BLD-MCNC: 15.1 G/DL (ref 12–17)
IGA SERPL-MCNC: 300 MG/DL (ref 70–400)
IGG SERPL-MCNC: 995 MG/DL (ref 700–1600)
IGM SERPL-MCNC: 28 MG/DL (ref 40–230)
IMM GRANULOCYTES # BLD AUTO: 0.03 THOUSAND/UL (ref 0–0.2)
IMM GRANULOCYTES NFR BLD AUTO: 1 % (ref 0–2)
INR PPP: 1.03 (ref 0.84–1.19)
LYMPHOCYTES # BLD AUTO: 2.06 THOUSANDS/ΜL (ref 0.6–4.47)
LYMPHOCYTES NFR BLD AUTO: 31 % (ref 14–44)
MCH RBC QN AUTO: 29.7 PG (ref 26.8–34.3)
MCHC RBC AUTO-ENTMCNC: 32.4 G/DL (ref 31.4–37.4)
MCV RBC AUTO: 92 FL (ref 82–98)
MONOCYTES # BLD AUTO: 0.55 THOUSAND/ΜL (ref 0.17–1.22)
MONOCYTES NFR BLD AUTO: 8 % (ref 4–12)
NEUTROPHILS # BLD AUTO: 3.81 THOUSANDS/ΜL (ref 1.85–7.62)
NEUTS SEG NFR BLD AUTO: 58 % (ref 43–75)
NRBC BLD AUTO-RTO: 0 /100 WBCS
PLATELET # BLD AUTO: 182 THOUSANDS/UL (ref 149–390)
PMV BLD AUTO: 13.4 FL (ref 8.9–12.7)
POTASSIUM SERPL-SCNC: 3.9 MMOL/L (ref 3.5–5.3)
PROT SERPL-MCNC: 7.5 G/DL (ref 6.4–8.2)
PROTHROMBIN TIME: 13.4 SECONDS (ref 11.6–14.5)
RBC # BLD AUTO: 5.09 MILLION/UL (ref 3.88–5.62)
SODIUM SERPL-SCNC: 142 MMOL/L (ref 136–145)
WBC # BLD AUTO: 6.56 THOUSAND/UL (ref 4.31–10.16)

## 2021-05-14 PROCEDURE — 80053 COMPREHEN METABOLIC PANEL: CPT | Performed by: INTERNAL MEDICINE

## 2021-05-14 PROCEDURE — 85610 PROTHROMBIN TIME: CPT

## 2021-05-14 PROCEDURE — 86708 HEPATITIS A ANTIBODY: CPT

## 2021-05-14 PROCEDURE — 36415 COLL VENOUS BLD VENIPUNCTURE: CPT | Performed by: INTERNAL MEDICINE

## 2021-05-14 PROCEDURE — U0003 INFECTIOUS AGENT DETECTION BY NUCLEIC ACID (DNA OR RNA); SEVERE ACUTE RESPIRATORY SYNDROME CORONAVIRUS 2 (SARS-COV-2) (CORONAVIRUS DISEASE [COVID-19]), AMPLIFIED PROBE TECHNIQUE, MAKING USE OF HIGH THROUGHPUT TECHNOLOGIES AS DESCRIBED BY CMS-2020-01-R: HCPCS | Performed by: INTERNAL MEDICINE

## 2021-05-14 PROCEDURE — 82784 ASSAY IGA/IGD/IGG/IGM EACH: CPT

## 2021-05-14 PROCEDURE — 85025 COMPLETE CBC W/AUTO DIFF WBC: CPT | Performed by: INTERNAL MEDICINE

## 2021-05-14 PROCEDURE — U0005 INFEC AGEN DETEC AMPLI PROBE: HCPCS | Performed by: INTERNAL MEDICINE

## 2021-05-14 PROCEDURE — 86706 HEP B SURFACE ANTIBODY: CPT

## 2021-05-14 PROCEDURE — 82785 ASSAY OF IGE: CPT

## 2021-05-16 LAB — SARS-COV-2 RNA RESP QL NAA+PROBE: NEGATIVE

## 2021-05-17 ENCOUNTER — HOSPITAL ENCOUNTER (OUTPATIENT)
Dept: NON INVASIVE DIAGNOSTICS | Facility: HOSPITAL | Age: 56
Discharge: HOME | End: 2021-05-17
Admitting: RADIOLOGY
Payer: COMMERCIAL

## 2021-05-17 VITALS
DIASTOLIC BLOOD PRESSURE: 79 MMHG | OXYGEN SATURATION: 98 % | RESPIRATION RATE: 18 BRPM | TEMPERATURE: 98 F | HEART RATE: 70 BPM | WEIGHT: 196 LBS | HEIGHT: 67 IN | BODY MASS INDEX: 30.76 KG/M2 | SYSTOLIC BLOOD PRESSURE: 123 MMHG

## 2021-05-17 DIAGNOSIS — R79.89 ABNORMAL LFTS: ICD-10-CM

## 2021-05-17 LAB — TOTAL IGE SMQN RAST: 71.5 KU/L (ref 0–113)

## 2021-05-17 PROCEDURE — 88307 TISSUE EXAM BY PATHOLOGIST: CPT | Performed by: PATHOLOGY

## 2021-05-17 PROCEDURE — 47000 NEEDLE BIOPSY OF LIVER PERQ: CPT

## 2021-05-17 PROCEDURE — 88313 SPECIAL STAINS GROUP 2: CPT | Performed by: PATHOLOGY

## 2021-05-17 PROCEDURE — 76942 ECHO GUIDE FOR BIOPSY: CPT | Performed by: RADIOLOGY

## 2021-05-17 PROCEDURE — 47000 NEEDLE BIOPSY OF LIVER PERQ: CPT | Performed by: RADIOLOGY

## 2021-05-17 PROCEDURE — 99152 MOD SED SAME PHYS/QHP 5/>YRS: CPT

## 2021-05-17 PROCEDURE — 99152 MOD SED SAME PHYS/QHP 5/>YRS: CPT | Performed by: RADIOLOGY

## 2021-05-17 RX ORDER — LIDOCAINE WITH 8.4% SOD BICARB 0.9%(10ML)
SYRINGE (ML) INJECTION CODE/TRAUMA/SEDATION MEDICATION
Status: COMPLETED | OUTPATIENT
Start: 2021-05-17 | End: 2021-05-17

## 2021-05-17 RX ORDER — MIDAZOLAM HYDROCHLORIDE 2 MG/2ML
INJECTION, SOLUTION INTRAMUSCULAR; INTRAVENOUS CODE/TRAUMA/SEDATION MEDICATION
Status: COMPLETED | OUTPATIENT
Start: 2021-05-17 | End: 2021-05-17

## 2021-05-17 RX ORDER — SODIUM CHLORIDE 9 MG/ML
75 INJECTION, SOLUTION INTRAVENOUS CONTINUOUS
Status: DISCONTINUED | OUTPATIENT
Start: 2021-05-17 | End: 2021-05-18 | Stop reason: HOSPADM

## 2021-05-17 RX ORDER — FENTANYL CITRATE 50 UG/ML
INJECTION, SOLUTION INTRAMUSCULAR; INTRAVENOUS CODE/TRAUMA/SEDATION MEDICATION
Status: COMPLETED | OUTPATIENT
Start: 2021-05-17 | End: 2021-05-17

## 2021-05-17 RX ADMIN — MIDAZOLAM 1 MG: 1 INJECTION INTRAMUSCULAR; INTRAVENOUS at 10:25

## 2021-05-17 RX ADMIN — FENTANYL CITRATE 50 MCG: 50 INJECTION, SOLUTION INTRAMUSCULAR; INTRAVENOUS at 10:25

## 2021-05-17 RX ADMIN — FENTANYL CITRATE 50 MCG: 50 INJECTION, SOLUTION INTRAMUSCULAR; INTRAVENOUS at 10:19

## 2021-05-17 RX ADMIN — Medication 10 ML: at 10:20

## 2021-05-17 RX ADMIN — MIDAZOLAM 1 MG: 1 INJECTION INTRAMUSCULAR; INTRAVENOUS at 10:19

## 2021-05-17 RX ADMIN — SODIUM CHLORIDE 75 ML/HR: 0.9 INJECTION, SOLUTION INTRAVENOUS at 09:48

## 2021-05-17 NOTE — PERIOPERATIVE NURSING NOTE
Criteria met for discharge  IV removed  Instructions given to patient and wife, all questions answered

## 2021-05-17 NOTE — SEDATION DOCUMENTATION
Dstat to needle tract  Pt to go back to [facility] for 4 hour bedrest until 1430  Pt comfortable denies pain

## 2021-05-17 NOTE — PERIOPERATIVE NURSING NOTE
Received patient to [facility] from IR, awake but drowsy, VSS  Frequent vital signs started  BandAid on right flank dry and intact  No pain or discomfort reported at this time    Pt wishes to rest

## 2021-05-17 NOTE — BRIEF OP NOTE (RAD/CATH)
INTERVENTIONAL RADIOLOGY PROCEDURE NOTE    Date: 5/17/2021    Procedure: IR BIOPSY LIVER RANDOM    Preoperative diagnosis:   1  Abnormal LFTs         Postoperative diagnosis: Same  Surgeon: Landy Joshi MD     Assistant: None  No qualified resident was available  Blood loss: Minimal    Specimens: 3, 18 G cores  Findings: Non-target liver biopsy  D-stat used  Complications: None immediate      Anesthesia: conscious sedation

## 2021-05-17 NOTE — INTERVAL H&P NOTE
Update: (This section must be completed if the H&P was completed greater than 24 hrs to procedure or admission)    H&P reviewed  After examining the patient, I find no changed to the H&P since it had been written  Patient re-evaluated   Accept as history and physical     Adams Avitia MD/May 17, 2021/10:35 AM

## 2021-05-17 NOTE — DISCHARGE INSTRUCTIONS
Percutaneous Liver Biopsy   WHAT YOU NEED TO KNOW:   A PLB is a procedure to remove a sample of tissue from your liver  The sample can be sent to a lab and tested for liver disease, cancer, or infection  After the procedure you may have pain and bruising at the biopsy site  You may also have pain in your right shoulder  These symptoms should get better in 48 to 72 hours  DISCHARGE INSTRUCTIONS:     [address] and [place] patients,  Contact Interventional Radiology at [phone] PATIENTS: Contact Interventional Radiology at [phone]   [facility] PATIENTS: Contact Interventional Radiology at [phone] if:    · Fever greater than 101 or chills  · You have severe pain in your abdomen  · Your abdomen is larger than usual and feels hard  · Your neck is more swollen and you have trouble swallowing  · You feel weak or dizzy  · Your heart is beating faster than usual    · Your pain does not get better after you take pain medicine  · Your wound is red, swollen, or draining pus  · You have nausea or are vomiting  · Your skin is itchy, swollen, or you have a rash  · You have questions or concerns about your condition or care  Medicines:   · Acetaminophen decreases pain and fever  It is available without a doctor's order  Acetaminophen can cause liver damage if not taken correctly  · Take your home medicine as directed  · Resume your normal diet  Small sips of flat soda will help with mild nausea  Self-care:   · Rest as directed  Do not play sports, exercise, or lift anything heavier than 5 pounds for up to 1 week  · Apply firm, steady pressure if bleeding occurs  A small amount of bleeding from your wound is possible  Apply pressure with a clean gauze or towel for 5 to 10 minutes  Call 911 if bleeding becomes heavy or does not stop  · Ask your healthcare provider when to take your blood thinner or antiplatelet medicine   You may need to wait 24 to 72 hours to take your medicine  This will prevent bleeding  Follow up with your healthcare provider as directed: Write down your questions so you remember to ask them during your visits  Procedural Sedation   WHAT YOU NEED TO KNOW:   Procedural sedation is medicine used during procedures to help you feel relaxed and calm  You will remember little to none of the procedure  After sedation you may feel tired, weak, or unsteady on your feet  You may also have trouble concentrating or short-term memory loss  These symptoms should go away in 24 hours or less  DISCHARGE INSTRUCTIONS:     Call 911 or have someone else call for any of the following:   · You have sudden trouble breathing      · You cannot be woken  Contact Interventional Radiology at [phone]   [facility] PATIENTS: Contact Interventional Radiology at [phone]) De[facility] Mcdermott PATIENTS: Contact Interventional Radiology at [phone]) if any of the following occur:     · You have a severe headache or dizziness      · Your heart is beating faster than usual     · You have a fever or chills      · Your skin is itchy, swollen, or you have a rash      · You have nausea or are vomiting for more than 8 hours after the procedure       · You have questions or concerns about your condition or care  Self-care:   · Have someone stay with you for 24 hours  This person can drive you to errands and help you do things around the house  This person can also watch for problems       · Rest and do quiet activities for 24 hours  Do not exercise, ride a bike, or play sports  Stand up slowly to prevent dizziness and falls  Take short walks around the house with another person  Slowly return to your usual activities the next day       · Do not drive or use dangerous machines or tools for 24 hours  You may injure yourself or others  Examples include a lawnmower, saw, or drill   Do not return to work for 24 hours if you use dangerous machines or tools for work       · Do not make important decisions for 24 hours  For example, do not sign important papers or invest money       · Drink liquids as directed  Liquids help flush the sedation medicine out of your body  Ask how much liquid to drink each day and which liquids are best for you       · Eat small, frequent meals to prevent nausea and vomiting  Start with clear liquids such as juice or broth  If you do not vomit after clear liquids, you can eat your usual foods       · Do not drink alcohol or take medicines that make you drowsy  This includes medicines that help you sleep and anxiety medicines  Ask your healthcare provider if it is safe for you to take pain medicine  Follow up with your healthcare provider as directed: Write down your questions so you remember to ask them during your visits

## 2021-05-20 ENCOUNTER — TELEPHONE (OUTPATIENT)
Dept: GASTROENTEROLOGY | Facility: AMBULARY SURGERY CENTER | Age: 56
End: 2021-05-20

## 2021-05-20 NOTE — TELEPHONE ENCOUNTER
----- Message from Linda Isabel MD sent at 5/19/2021  3:13 PM EDT -----  Please inform the patient that the liver biopsy is consistent with nonalcoholic fatty liver disease  He has mild fibrosis, would recommend healthy weight loss at this time  Follow-up in the office after MRI as previously recommended

## 2021-05-20 NOTE — TELEPHONE ENCOUNTER
Pt aware of results and verbalized understanding  Pt has MRI on 05/27/21  Pt asked if his gallbladder is fine

## 2021-05-20 NOTE — TELEPHONE ENCOUNTER
Will get a good picture of his gallbladder at the time of his upcoming MRI; his liver biopsy was not intended to check on his gallbladder so it did not provide any information about his gallbladder  Based on previous imaging studies, if he is not having any abdominal pain or significant discomfort with eating, I do not suspect he is having a gallbladder issue at this time    Thank you

## 2021-05-21 ENCOUNTER — TELEPHONE (OUTPATIENT)
Dept: GASTROENTEROLOGY | Facility: AMBULARY SURGERY CENTER | Age: 56
End: 2021-05-21

## 2021-05-27 ENCOUNTER — HOSPITAL ENCOUNTER (OUTPATIENT)
Dept: RADIOLOGY | Facility: HOSPITAL | Age: 56
Discharge: HOME | End: 2021-05-27
Attending: INTERNAL MEDICINE
Payer: COMMERCIAL

## 2021-05-27 DIAGNOSIS — R79.89 ABNORMAL LFTS: ICD-10-CM

## 2021-05-27 DIAGNOSIS — K75.81 STEATOHEPATITIS: ICD-10-CM

## 2021-05-27 PROCEDURE — G1004 CDSM NDSC: HCPCS

## 2021-05-27 PROCEDURE — A9585 GADOBUTROL INJECTION: HCPCS | Performed by: INTERNAL MEDICINE

## 2021-05-27 PROCEDURE — 74183 MRI ABD W/O CNTR FLWD CNTR: CPT

## 2021-05-27 RX ADMIN — GADOBUTROL 9 ML: 604.72 INJECTION INTRAVENOUS at 09:20

## 2021-06-01 ENCOUNTER — TELEPHONE (OUTPATIENT)
Dept: SLEEP CENTER | Facility: CLINIC | Age: 56
End: 2021-06-01

## 2021-06-01 NOTE — TELEPHONE ENCOUNTER
----- Message from Diana Barros DO sent at 5/30/2021 10:30 PM EDT -----  approved  ----- Message -----  From: Willian Robertson MA  Sent: 5/6/2021  10:58 AM EDT  To: Sleep Medicine [facility] Provider    This sleep study needs approval      If approved please sign and return to clerical pool  If denied please include reasons why  Also provide alternative testing if warranted  Please sign and return to clerical pool

## 2021-06-04 ENCOUNTER — TELEPHONE (OUTPATIENT)
Dept: GASTROENTEROLOGY | Facility: CLINIC | Age: 56
End: 2021-06-04

## 2021-06-04 NOTE — TELEPHONE ENCOUNTER
----- Message from Luis Manuel Joseph MD sent at 6/3/2021  2:44 PM EDT -----  Please inform the patient that MRI is notable only for fatty infiltration of the liver, would recommend to continue to work on healthy weight loss  He also has a small tiny cyst in the liver which is benign  Pancreas appears unremarkable, pancreatic ducts appear unremarkable  No liver lesions seen  Follow-up in the office in 6-8 months with PA

## 2021-06-21 ENCOUNTER — TELEPHONE (OUTPATIENT)
Dept: GASTROENTEROLOGY | Facility: AMBULARY SURGERY CENTER | Age: 56
End: 2021-06-21

## 2021-07-21 ENCOUNTER — TELEPHONE (OUTPATIENT)
Dept: GASTROENTEROLOGY | Facility: AMBULARY SURGERY CENTER | Age: 56
End: 2021-07-21

## 2021-07-21 NOTE — TELEPHONE ENCOUNTER
Pt left voicemail to go over results for MRI  Called and spoke to pt, aware of results and verbalized understanding  Advised to call office with any questions or concerns

## 2021-08-24 ENCOUNTER — OFFICE VISIT (OUTPATIENT)
Dept: URGENT CARE | Facility: CLINIC | Age: 56
End: 2021-08-24
Payer: COMMERCIAL

## 2021-08-24 VITALS
BODY MASS INDEX: 30.76 KG/M2 | TEMPERATURE: 97.6 F | HEIGHT: 67 IN | RESPIRATION RATE: 18 BRPM | DIASTOLIC BLOOD PRESSURE: 86 MMHG | SYSTOLIC BLOOD PRESSURE: 135 MMHG | OXYGEN SATURATION: 98 % | HEART RATE: 77 BPM | WEIGHT: 196 LBS

## 2021-08-24 DIAGNOSIS — H60.312 ACUTE DIFFUSE OTITIS EXTERNA OF LEFT EAR: Primary | ICD-10-CM

## 2021-08-24 PROCEDURE — 99213 OFFICE O/P EST LOW 20 MIN: CPT | Performed by: FAMILY MEDICINE

## 2021-08-24 RX ORDER — NEOMYCIN SULFATE, POLYMYXIN B SULFATE AND HYDROCORTISONE 10; 3.5; 1 MG/ML; MG/ML; [USP'U]/ML
4 SUSPENSION/ DROPS AURICULAR (OTIC) 4 TIMES DAILY
Qty: 10 ML | Refills: 0 | Status: SHIPPED | OUTPATIENT
Start: 2021-08-24 | End: 2021-08-27 | Stop reason: SDUPTHER

## 2021-08-24 NOTE — PROGRESS NOTES
[facility]        NAME: Lucía Garcia is a 61 y o  male  : 1965    MRN: [id number]  DATE: 2021  TIME: 1:33 PM    Assessment and Plan   Acute diffuse otitis externa of left ear [H60 312]  1  Acute diffuse otitis externa of left ear  neomycin-polymyxin-hydrocortisone (CORTISPORIN) 0 35%-10,000 units/mL-1% otic suspension     Acute otitis externa per clinical evaluation  Antibiotic ear drops prescribed  May take OTC pain relievers as needed  Patient Instructions     Follow up with PCP in 3-5 days  Proceed to  ER if symptoms worsen  Chief Complaint     Chief Complaint   Patient presents with    Earache     Pt reports of left sided ear pain started approx 2-3 days ago  History of Present Illness     50-year-old male presents today due to 2 days of left otalgia  Denies any other surrounding symptoms  Of note, was found to be positive for COVID about 2 weeks ago and is currently asymptomatic  Was vaccinated in   Review of Systems   Review of Systems   Constitutional: Negative for chills and fever  HENT: Positive for ear pain (left)  Negative for congestion, rhinorrhea and sore throat  Respiratory: Negative for cough, shortness of breath and wheezing  Cardiovascular: Negative for chest pain  Gastrointestinal: Negative for abdominal pain and nausea  Skin: Negative for rash  Neurological: Negative for dizziness and headaches           Current Medications       Current Outpatient Medications:     ibuprofen (MOTRIN) 600 mg tablet, Take 1 tablet (600 mg total) by mouth every 6 (six) hours as needed for mild pain, Disp: 30 tablet, Rfl: 0    neomycin-polymyxin-hydrocortisone (CORTISPORIN) 0 35%-10,000 units/mL-1% otic suspension, Administer 4 drops into the left ear 4 (four) times a day for 5 days, Disp: 10 mL, Rfl: 0    omeprazole (PriLOSEC) 40 MG capsule, Take 1 capsule (40 mg total) by mouth daily, Disp: 30 capsule, Rfl: 3    sodium chloride (OCEAN) 0 65 % nasal spray, 1 spray into each nostril as needed for congestion or rhinitis, Disp: 30 mL, Rfl: 0    Current Allergies     Allergies as of 08/24/2021    (No Known Allergies)            The following portions of the patient's history were reviewed and updated as appropriate: allergies, current medications, past family history, past medical history, past social history, past surgical history and problem list      Past Medical History:   Diagnosis Date    Abnormal weight gain     Last assessed 3/24/2014    Allergic     Anxiety     Gastroesophageal reflux disease without esophagitis 3/2/2020    Hemorrhoids     History of gastroesophageal reflux (GERD)     Hyperlipidemia     Last assessed 3/24/2014     Sleep apnea     insurance not covering       Past Surgical History:   Procedure Laterality Date    COLONOSCOPY      IR BIOPSY LIVER RANDOM  5/17/2021    MI PALATOPHAYNGOPLASTY N/A 3/13/2020    Procedure: UVULOPALATOPHARYNGOPLASTY (UPPP); Surgeon: Fatimah Jiménez MD;  Location:  MAIN OR;  Service: ENT    TONSILLECTOMY  03/16/2020    UVULECTOMY  03/16/2020       Family History   Problem Relation Age of Onset    Hypertension Mother     No Known Problems Father     COPD Maternal Uncle     No Known Problems Sister     No Known Problems Brother          Medications have been verified  Objective   /86   Pulse 77   Temp 97 6 °F (36 4 °C)   Resp 18   Ht 5' 7" (1 702 m)   Wt 88 9 kg (196 lb)   SpO2 98%   BMI 30 70 kg/m²   No LMP for male patient  Physical Exam     Physical Exam  Vitals and nursing note reviewed  Constitutional:       General: He is in acute distress (Left otalgia)  Appearance: Normal appearance  He is normal weight  He is not ill-appearing, toxic-appearing or diaphoretic  HENT:      Head: Normocephalic and atraumatic  Right Ear: Tympanic membrane, ear canal and external ear normal  There is no impacted cerumen  Left Ear:  There is no impacted cerumen  Ears:      Comments: Pain reproduced with tugging the left ear  Ear canal swollen with yellow crusting  Mouth/Throat:      Mouth: Mucous membranes are moist       Pharynx: No posterior oropharyngeal erythema  Eyes:      General:         Right eye: No discharge  Left eye: No discharge  Conjunctiva/sclera: Conjunctivae normal    Pulmonary:      Effort: Pulmonary effort is normal    Skin:     General: Skin is warm  Findings: No erythema  Neurological:      General: No focal deficit present  Mental Status: He is alert and oriented to person, place, and time  Psychiatric:         Mood and Affect: Mood normal          Behavior: Behavior normal          Thought Content:  Thought content normal          Judgment: Judgment normal

## 2021-09-01 DIAGNOSIS — H60.312 ACUTE DIFFUSE OTITIS EXTERNA OF LEFT EAR: ICD-10-CM

## 2021-09-01 RX ORDER — NEOMYCIN SULFATE, POLYMYXIN B SULFATE AND HYDROCORTISONE 10; 3.5; 1 MG/ML; MG/ML; [USP'U]/ML
4 SUSPENSION/ DROPS AURICULAR (OTIC) 4 TIMES DAILY
Qty: 10 ML | Refills: 0 | Status: SHIPPED | OUTPATIENT
Start: 2021-09-01 | End: 2021-09-13 | Stop reason: SDUPTHER

## 2021-09-02 ENCOUNTER — TELEPHONE (OUTPATIENT)
Dept: URGENT CARE | Facility: CLINIC | Age: 56
End: 2021-09-02

## 2021-09-02 NOTE — TELEPHONE ENCOUNTER
Pt called saying his medication had not be approved/ the pharmacy did not have it  The medication was sent to the pharmacy 3 different times after the first visit

## 2021-09-13 ENCOUNTER — OFFICE VISIT (OUTPATIENT)
Dept: FAMILY MEDICINE CLINIC | Facility: CLINIC | Age: 56
End: 2021-09-13
Payer: COMMERCIAL

## 2021-09-13 VITALS
HEART RATE: 82 BPM | SYSTOLIC BLOOD PRESSURE: 144 MMHG | TEMPERATURE: 97 F | OXYGEN SATURATION: 97 % | HEIGHT: 67 IN | DIASTOLIC BLOOD PRESSURE: 88 MMHG | WEIGHT: 195.9 LBS | BODY MASS INDEX: 30.75 KG/M2 | RESPIRATION RATE: 18 BRPM

## 2021-09-13 DIAGNOSIS — H60.312 ACUTE DIFFUSE OTITIS EXTERNA OF LEFT EAR: ICD-10-CM

## 2021-09-13 DIAGNOSIS — R07.9 INTERMITTENT CHEST PAIN: Primary | ICD-10-CM

## 2021-09-13 DIAGNOSIS — Z11.4 ENCOUNTER FOR SCREENING FOR HIV: ICD-10-CM

## 2021-09-13 PROCEDURE — 99213 OFFICE O/P EST LOW 20 MIN: CPT | Performed by: FAMILY MEDICINE

## 2021-09-13 PROCEDURE — 1036F TOBACCO NON-USER: CPT | Performed by: FAMILY MEDICINE

## 2021-09-13 PROCEDURE — 3008F BODY MASS INDEX DOCD: CPT | Performed by: FAMILY MEDICINE

## 2021-09-13 RX ORDER — NEOMYCIN SULFATE, POLYMYXIN B SULFATE AND HYDROCORTISONE 10; 3.5; 1 MG/ML; MG/ML; [USP'U]/ML
4 SUSPENSION/ DROPS AURICULAR (OTIC) 4 TIMES DAILY
Qty: 10 ML | Refills: 0 | Status: SHIPPED | OUTPATIENT
Start: 2021-09-13 | End: 2022-03-31

## 2021-09-13 NOTE — PROGRESS NOTES
Victoriano Ferro  61 y o   09/13/21      CC:   Left ear pain  Care gaps addressed this visit:  HIV screening  Follow up:  As needed    Problem List Items Addressed This Visit     None      Visit Diagnoses     Intermittent chest pain    -  Primary    Relevant Orders    Ambulatory referral to Cardiology    Encounter for screening for HIV        Relevant Orders    Human Immunodeficiency Virus 1/2 Antigen / Antibody ( Fourth Generation) with Reflex Testing    Acute diffuse otitis externa of left ear        Relevant Medications    neomycin-polymyxin-hydrocortisone (CORTISPORIN) 0 35%-10,000 units/mL-1% otic suspension      ·    Patient recently seen in urgent care was unable to  topical antibiotics because of insurance reasons  I have sent in topical antibiotic drops to [address] [place] [address] and patient is to notify the office if there is any problems with this prescription  Subjective:       Patient also complains episodes of chest pressure that last for approximately 3 minutes  He states that these episodes happen every couple of days and are not provoked by any particular activities  He says sometimes they will happen when he is walking and sometimes they will happen when he is watching TV  He has never had a cardiac event nor has he seen a cardiologist in the past   He does not have chest pain currently  Earache   There is pain in the left ear  This is a new problem  The current episode started 1 to 4 weeks ago  The problem occurs constantly  The problem has been gradually improving  There has been no fever  The pain is at a severity of 7/10  The pain is moderate  Associated symptoms include coughing, ear discharge, headaches, neck pain and rhinorrhea  Pertinent negatives include no abdominal pain, diarrhea, hearing loss, rash, sore throat or vomiting   He has tried NSAIDs (Patient went to urgent care where he was prescribed topical ear drops however he was unable to get these from his pharmacy because of insurance reasons ) for the symptoms  The treatment provided mild relief  His past medical history is significant for a chronic ear infection  There is no history of hearing loss or a tympanostomy tube  uvula removal for sleep apnea       Review of Systems   HENT: Positive for ear discharge, ear pain and rhinorrhea  Negative for hearing loss and sore throat  Respiratory: Positive for cough  Gastrointestinal: Negative for abdominal pain, diarrhea and vomiting  Musculoskeletal: Positive for neck pain  Skin: Negative for rash  Neurological: Positive for headaches  The following portions of the patient's history were reviewed and updated as appropriate:  current medications, past family history, past medical history, past social history, past surgical history and problem list     Current Outpatient Medications on File Prior to Visit   Medication Sig Dispense Refill    ibuprofen (MOTRIN) 600 mg tablet Take 1 tablet (600 mg total) by mouth every 6 (six) hours as needed for mild pain 30 tablet 0    omeprazole (PriLOSEC) 40 MG capsule Take 1 capsule (40 mg total) by mouth daily 30 capsule 3    sodium chloride (OCEAN) 0 65 % nasal spray 1 spray into each nostril as needed for congestion or rhinitis (Patient not taking: Reported on 9/13/2021) 30 mL 0    [DISCONTINUED] neomycin-polymyxin-hydrocortisone (CORTISPORIN) 0 35%-10,000 units/mL-1% otic suspension Administer 4 drops into the left ear 4 (four) times a day for 5 days 10 mL 0     No current facility-administered medications on file prior to visit  Objective:    /88 (BP Location: Left arm, Patient Position: Sitting, Cuff Size: Large)   Pulse 82   Temp (!) 97 °F (36 1 °C) (Tympanic)   Resp 18   Ht 5' 7" (1 702 m)   Wt 88 9 kg (195 lb 14 4 oz)   SpO2 97%   BMI 30 68 kg/m²     Physical Exam  Constitutional:       General: He is not in acute distress  Appearance: He is not ill-appearing     HENT:      Right Ear: Tympanic membrane, ear canal and external ear normal       Left Ear: No decreased hearing noted  Swelling and tenderness present  No laceration or drainage  No middle ear effusion  There is no impacted cerumen  No foreign body  No mastoid tenderness  No PE tube  No hemotympanum  Tympanic membrane is not injected, scarred, perforated, erythematous, retracted or bulging  Tympanic membrane has normal mobility  Ears:      Comments:  Left external ear canal is swollen and there is a positive tug test      Mouth/Throat:      Comments: S/p   Tonsillectomy and uvulectomy  Cardiovascular:      Rate and Rhythm: Normal rate and regular rhythm  Pulmonary:      Effort: Pulmonary effort is normal       Breath sounds: Normal breath sounds  Neurological:      General: No focal deficit present  Mental Status: He is alert and oriented to person, place, and time  Psychiatric:         Attention and Perception: Attention normal          Mood and Affect: Mood normal          Speech: Speech normal          Behavior: Behavior normal  Behavior is cooperative  Thought Content: Thought content normal          Cognition and Memory: Cognition normal          Judgment: Judgment normal                    Some portions of this record may have been generated with voice recognition software  There may be translation, syntax, or grammatical errors  Occasional wrong word or "sound-a-like" substitutions may have occurred due to the inherent limitations of the voice recognition software       [address] [facility]   PGY3

## 2021-11-03 ENCOUNTER — HOSPITAL ENCOUNTER (OUTPATIENT)
Dept: SLEEP CENTER | Facility: CLINIC | Age: 56
Discharge: HOME | End: 2021-11-03
Payer: COMMERCIAL

## 2021-11-03 DIAGNOSIS — G47.33 OSA (OBSTRUCTIVE SLEEP APNEA): ICD-10-CM

## 2021-11-03 PROCEDURE — G0399 HOME SLEEP TEST/TYPE 3 PORTA: HCPCS

## 2021-11-03 PROCEDURE — 95806 SLEEP STUDY UNATT&RESP EFFT: CPT

## 2021-11-05 ENCOUNTER — TELEPHONE (OUTPATIENT)
Dept: SLEEP CENTER | Facility: CLINIC | Age: 56
End: 2021-11-05

## 2021-11-18 ENCOUNTER — OFFICE VISIT (OUTPATIENT)
Dept: OTOLARYNGOLOGY | Facility: CLINIC | Age: 56
End: 2021-11-18
Payer: COMMERCIAL

## 2021-11-18 VITALS
SYSTOLIC BLOOD PRESSURE: 126 MMHG | DIASTOLIC BLOOD PRESSURE: 84 MMHG | WEIGHT: 194 LBS | TEMPERATURE: 98 F | HEART RATE: 90 BPM | OXYGEN SATURATION: 90 % | HEIGHT: 67 IN | BODY MASS INDEX: 30.45 KG/M2

## 2021-11-18 DIAGNOSIS — H60.8X3 CHRONIC ECZEMATOUS OTITIS EXTERNA OF BOTH EARS: ICD-10-CM

## 2021-11-18 DIAGNOSIS — G47.33 OSA (OBSTRUCTIVE SLEEP APNEA): Primary | ICD-10-CM

## 2021-11-18 PROCEDURE — 99213 OFFICE O/P EST LOW 20 MIN: CPT | Performed by: OTOLARYNGOLOGY

## 2021-11-18 PROCEDURE — 1036F TOBACCO NON-USER: CPT | Performed by: OTOLARYNGOLOGY

## 2021-11-18 PROCEDURE — 3008F BODY MASS INDEX DOCD: CPT | Performed by: OTOLARYNGOLOGY

## 2021-11-26 ENCOUNTER — TELEPHONE (OUTPATIENT)
Dept: OTOLARYNGOLOGY | Facility: CLINIC | Age: 56
End: 2021-11-26

## 2021-12-02 ENCOUNTER — TELEPHONE (OUTPATIENT)
Dept: OTOLARYNGOLOGY | Facility: CLINIC | Age: 56
End: 2021-12-02

## 2022-03-28 ENCOUNTER — TELEPHONE (OUTPATIENT)
Dept: GASTROENTEROLOGY | Facility: CLINIC | Age: 57
End: 2022-03-28

## 2022-03-28 NOTE — TELEPHONE ENCOUNTER
Called patient  Left message labs were put into system and to return call if he had any questions or concerns

## 2022-03-28 NOTE — TELEPHONE ENCOUNTER
I have placed orders for liver function tests, PT/INR, and hepatitis B surface antibody/hepatitis A total antibody as Dr Arabella Trejo had mentioned at patient's last office visit

## 2022-03-28 NOTE — TELEPHONE ENCOUNTER
Patient came into office to schedule six month follow up with Dr Paulette Reyes  I scheduled him for April 21st  He is requesting lab orders to be entered into chart, in order to go to lab prior to visit   Thank you

## 2022-03-31 ENCOUNTER — OFFICE VISIT (OUTPATIENT)
Dept: FAMILY MEDICINE CLINIC | Facility: CLINIC | Age: 57
End: 2022-03-31
Payer: COMMERCIAL

## 2022-03-31 VITALS
RESPIRATION RATE: 16 BRPM | HEART RATE: 90 BPM | SYSTOLIC BLOOD PRESSURE: 134 MMHG | BODY MASS INDEX: 29.91 KG/M2 | WEIGHT: 190.6 LBS | DIASTOLIC BLOOD PRESSURE: 70 MMHG | OXYGEN SATURATION: 98 % | HEIGHT: 67 IN | TEMPERATURE: 97.5 F

## 2022-03-31 DIAGNOSIS — J31.0 CHRONIC RHINITIS: Primary | ICD-10-CM

## 2022-03-31 PROBLEM — R19.7 DIARRHEA: Status: RESOLVED | Noted: 2019-07-24 | Resolved: 2022-03-31

## 2022-03-31 PROBLEM — R79.89 ABNORMAL LFTS: Status: RESOLVED | Noted: 2020-03-02 | Resolved: 2022-03-31

## 2022-03-31 PROBLEM — R05.9 COUGH: Status: RESOLVED | Noted: 2019-10-01 | Resolved: 2022-03-31

## 2022-03-31 PROCEDURE — 3008F BODY MASS INDEX DOCD: CPT | Performed by: FAMILY MEDICINE

## 2022-03-31 PROCEDURE — 99213 OFFICE O/P EST LOW 20 MIN: CPT | Performed by: FAMILY MEDICINE

## 2022-03-31 RX ORDER — AMOXICILLIN AND CLAVULANATE POTASSIUM 875; 125 MG/1; MG/1
1 TABLET, FILM COATED ORAL EVERY 12 HOURS SCHEDULED
Qty: 20 TABLET | Refills: 0 | Status: SHIPPED | OUTPATIENT
Start: 2022-03-31 | End: 2022-04-10

## 2022-03-31 RX ORDER — LORATADINE 10 MG/1
10 TABLET ORAL DAILY
Qty: 30 TABLET | Refills: 1 | Status: SHIPPED | OUTPATIENT
Start: 2022-03-31 | End: 2022-06-06

## 2022-03-31 RX ORDER — FLUTICASONE PROPIONATE 50 MCG
1 SPRAY, SUSPENSION (ML) NASAL DAILY
Qty: 18.2 ML | Refills: 1 | Status: SHIPPED | OUTPATIENT
Start: 2022-03-31 | End: 2022-06-23 | Stop reason: SDUPTHER

## 2022-03-31 NOTE — PROGRESS NOTES
Maicol Mondragon 1965 male MRN: [id number]    Family Medicine Acute Visit    ASSESSMENT/PLAN  1  Chronic rhinitis  · Given persistent symptoms with sinus pressure and pain and greenish dark sputum production will treat with antibiotics and Flonase and Claritin  · Patient instructed to buy over-the-counter nasal irrigation system such as Neti pot  As helping flush out the mucus and maintaining the sinuses clean will help us prevent future infection  · RTO in 4 weeks for annual physical and recheck  - amoxicillin-clavulanate (Augmentin) 875-125 mg per tablet; Take 1 tablet by mouth every 12 (twelve) hours for 10 days  Dispense: 20 tablet; Refill: 0  - fluticasone (FLONASE) 50 mcg/act nasal spray; 1 spray into each nostril daily  Dispense: 18 2 mL; Refill: 1  - loratadine (CLARITIN) 10 mg tablet; Take 1 tablet (10 mg total) by mouth daily  Dispense: 30 tablet; Refill: 1         Future Appointments   Date Time Provider Jax Chambers   4/21/2022  3:20 PM Tova Leija MD [facility]          SUBJECTIVE  CC: Facial Pain (Reports 2 years ago he had a uvulectomy/tonsillectomy  Pt stated he had an intranasal infection treated with abx post-op by ENT  Now in past two month pt states he has what he calls sinus pain  Located on B/l sidfe of bridge of nose, as well as left cheek and through side of head  Pt reports excess mucus, usually dark dreen or yellow in color and "old-looking, like it was in there for a while (pointing to sinuses)")      HPI:  Maicol Mondragon is a 61 y o  male who presents for    Sinus Problem  This is a chronic problem  Episode onset: 2 months ago  The problem is unchanged  There has been no fever  Associated symptoms include congestion, headaches, sinus pressure (maxillary) and sneezing  Pertinent negatives include no coughing or shortness of breath  (Nasal septum scab that has been present for 3 years now  Post-nasal drip  Dry throat  Rhinorrhea   Eye itching, watering, and some redness Greenish dark purulent sputum from nose for the past month as well ) Past treatments include nothing  Review of Systems   HENT: Positive for congestion, sinus pressure (maxillary) and sneezing  Respiratory: Negative for cough and shortness of breath  Neurological: Positive for headaches  Historical Information   The patient history was reviewed as follows:  Past Medical History:   Diagnosis Date    Abnormal weight gain     Last assessed 3/24/2014    Allergic     Anxiety     Gastroesophageal reflux disease without esophagitis 3/2/2020    Hemorrhoids     History of gastroesophageal reflux (GERD)     Hyperlipidemia     Last assessed 3/24/2014     Sleep apnea     insurance not covering         Past Surgical History:   Procedure Laterality Date    COLONOSCOPY      IR BIOPSY LIVER RANDOM  5/17/2021    PA PALATOPHAYNGOPLASTY N/A 3/13/2020    Procedure: UVULOPALATOPHARYNGOPLASTY (UPPP);   Surgeon: Pamella Jose MD;  Location:  MAIN OR;  Service: ENT    TONSILLECTOMY  03/16/2020    UVULECTOMY  03/16/2020     Family History   Problem Relation Age of Onset    Hypertension Mother     No Known Problems Father     COPD Maternal Uncle     No Known Problems Sister     No Known Problems Brother       Social History   Social History     Substance and Sexual Activity   Alcohol Use No     Social History     Substance and Sexual Activity   Drug Use No     Social History     Tobacco Use   Smoking Status Never Smoker   Smokeless Tobacco Never Used       Medications:     Current Outpatient Medications:     ibuprofen (MOTRIN) 600 mg tablet, Take 1 tablet (600 mg total) by mouth every 6 (six) hours as needed for mild pain (Patient not taking: Reported on 3/31/2022 ), Disp: 30 tablet, Rfl: 0    neomycin-polymyxin-hydrocortisone (CORTISPORIN) 0 35%-10,000 units/mL-1% otic suspension, Administer 4 drops into the left ear 4 (four) times a day for 5 days, Disp: 10 mL, Rfl: 0    omeprazole (PriLOSEC) 40 MG capsule, Take 1 capsule (40 mg total) by mouth daily (Patient not taking: Reported on 3/31/2022 ), Disp: 30 capsule, Rfl: 3    sodium chloride (OCEAN) 0 65 % nasal spray, 1 spray into each nostril as needed for congestion or rhinitis (Patient not taking: Reported on 3/31/2022 ), Disp: 30 mL, Rfl: 0    No Known Allergies    OBJECTIVE  Vitals:   Vitals:    03/31/22 0939   BP: 134/70   Pulse: 90   Resp: 16   Temp: 97 5 °F (36 4 °C)   TempSrc: Tympanic   SpO2: 98%   Weight: 86 5 kg (190 lb 9 6 oz)   Height: 5' 7" (1 702 m)         Physical Exam  Vitals reviewed  Constitutional:       General: He is awake  He is not in acute distress  HENT:      Head: Normocephalic  Right Ear: Ear canal normal  A middle ear effusion is present  Left Ear: Ear canal normal  A middle ear effusion is present  Nose: Congestion and rhinorrhea present  Rhinorrhea is clear  Right Turbinates: Enlarged, swollen and pale  Left Turbinates: Enlarged, swollen and pale  Right Sinus: Maxillary sinus tenderness and frontal sinus tenderness present  Left Sinus: Maxillary sinus tenderness and frontal sinus tenderness present  Comments: Dry excoriation left nostril nasal septum  Eyes:      General: Lids are normal  Allergic shiner present  No scleral icterus  Neurological:      Mental Status: He is alert  Psychiatric:         Behavior: Behavior is cooperative              Milton Villalba, [address] [place] [address]   3/31/2022

## 2022-03-31 NOTE — PATIENT INSTRUCTIONS
Lavado nasal   LO QUE NECESITA SABER:   Un lavado nasal es el uso de [place] solución de agua salada para aflojar la mucosidad y limpiar godfrey nariz de irritantes y alérgenos  Los irritantes y alérgenos causan síntomas suzan congestión, flujo nasal o goteo posterior nasal  Un lavado o enjuague nasal puede servir para retirar la bacteria y virus que causan infecciones  También puede ser recomendado después de ciertas cirugías nasales para promover la recuperación  INSTRUCCIONES SOBRE [facility]:   Suministros necesarios: Usted puede comprar un kit de lavado nasal en la farmacia  Un kit de lavado nasal le proporciona todos los suministros que necesita  Usted también puede hacer la solución salina en casa  Para introducir la solución salina en godfrey nariz se puede usar tereso geno de [place], tereso botella de enjuague nasal o tereso olla Neti  Un aparato de irrigación nasal también se puede usar para colocar el agua salada en godfrey nariz  Pregúntele a godfrey médico cuál método es mejor para usted  La forma de preparar la solución salina en casa: [employer] las elizabeth con agua y jabón antes de empezar  Para preparar la solución salina casedioni yamile lo siguiente:  · Mezcle ½ cucharadita o 2 ml de sal sin yodo con 1 vaso de agua tibia de 8 oz  Solamente use agua destilada, agua estéril o agua filtrada  El agua debe ser filtrada con un filtro con el tamaño del poro inferior a 1 micrón  Usted también puede usar agua que ha sido hervida por 1 minuto y dejado enfriar  Si usted vive a 6,500 pies de elevación [facility] del mar, debe dejar hervir el agua por 3 minutos  · Añada ¼ de cucharadita de polvo para hornear a la solución y [facility] [employer] que se disuelva por completo  · Prepare tereso nueva mezcla de la solución salina cada vez que se vaya hacer el enjuague nasal     Cómo hacer un lavado nasal: Pregunte a godfrey médico con que frecuencia debe realizar un lavado nasal  Es posible que necesite hacer un lavado 1 o 2 veces al día    · Si usted tiene el kit de lavado nasal, siga las instrucciones del empaque  · Si usted preparó el remedio casero de la solución salina, coloque la solución en la taza nasal, la olla Neti o la botella de enjuague nasal  Llene la geno de goma, apriete la geno con suavidad dentro de la solución déjela que se llene al succionar el líquido  Si usted está usando un aparato irrigador nasal, llene el tanque de reserva con el agua salada  · [employer] sobre el lavamanos con godfrey macario [place] godfrey macario a un lado  · Para usar la perilla o la botella nasal , inserte la boquilla en la parta callie de godfrey fosa nasal  Apunte la boquilla hacia el lado contrario del centro de godfrey nariz  Respire por la boca  Apriete la geno o la botella hasta que suavemente salga un chorro de la solución salina al interior de godfrey nariz y salga por la otra fosa nasal inferior  Repita en la otra fosa nasal     · Para usar el aparato irrigador nasal , coloque el aparato en el presión más baja  Respire por la boca  Inserte la punta en la parta callie dentro de godfrey fosa nasal  Apunte la boquilla hacia el lado contrario del centro de godfrey nariz  Permita que el líquido fluya por godfrey Angeli Pier y salga por la fosa nasal inferior  Repita en la otra fosa nasal     · Para usar la olleta Neti o la copa nasal , coloque el dexter de la olleta en la parte callie de godfrey fosa nasal  Respire por la boca  Con cuidado yamile la irrigación de la solución salina hacia godfrey fosa nasal superior y permita que fluya por la fosa nasal inferior  Repita en la otra fosa nasal          · Suene suavemente la nariz para extraer cualquier exceso de líquido  · Bote todo el so de solución salina sin usar  Limpie los aditamentos nasales después de cada uso para evitar el crecimiento de bacteria  La limpieza de los productos de lavado nasal:  · Limpie la perrilla de goma  llenándola con agua y agitándola  Extraiga toda el agua   Después, aspire alcohol desinfectante dentro de la perilla de goma y agítelo  Vacíe la perilla  Coloque la geno en un vaso limpio y vacío con la boquilla apuntando hacia abajo para dejar que se seque por completo  · Limpie la botella de enjuague nasal  y todas christopher partes con tereso pequeña cantidad de agua y Kilo Cut[place]  Enjuague la botella y la tapa con agua  Si godfrey botella es de uso seguro en el microondas, llénela con agua y caliéntela en el horno microondas por 2 minutos  Permita que la botella y la tapa se sequen sobre tereso toalla limpia  Si la botella de enjuague y la tapa se malou descoloridos, se deben desinfectar  Para hacer esto, enjuáguelos con tereso solución de alcohol medicinal o vinagre  Para hacer la solución de vinagre, mezcle 1 parte de vinagre con 3 partes de agua  · Limpie la olleta Neti o la copa nasal  con agua y [place]  Déjelos abiertos para que se puedan secar al aire por completo  © Copyright [employer] 2022 Information is for End User's use only and may not be sold, redistributed or otherwise used for commercial purposes  All illustrations and images included in CareNotes® are the copyrighted property of [employer]  or [address] [place] [address] es sólo para uso en educación  Godfrey intención no es darle un consejo médico sobre enfermedades o tratamientos  Colsulte con godfrey Charna Heckler farmacéutico antes de seguir cualquier régimen médico para saber si es seguro y efectivo para usted

## 2022-04-21 ENCOUNTER — OFFICE VISIT (OUTPATIENT)
Dept: GASTROENTEROLOGY | Facility: CLINIC | Age: 57
End: 2022-04-21
Payer: COMMERCIAL

## 2022-04-21 VITALS
TEMPERATURE: 97.9 F | HEART RATE: 74 BPM | HEIGHT: 67 IN | WEIGHT: 191 LBS | DIASTOLIC BLOOD PRESSURE: 87 MMHG | OXYGEN SATURATION: 97 % | BODY MASS INDEX: 29.98 KG/M2 | SYSTOLIC BLOOD PRESSURE: 131 MMHG

## 2022-04-21 DIAGNOSIS — D12.6 TUBULAR ADENOMA OF COLON: ICD-10-CM

## 2022-04-21 DIAGNOSIS — K76.0 NAFLD (NONALCOHOLIC FATTY LIVER DISEASE): Primary | ICD-10-CM

## 2022-04-21 DIAGNOSIS — K21.9 GASTROESOPHAGEAL REFLUX DISEASE WITHOUT ESOPHAGITIS: ICD-10-CM

## 2022-04-21 PROCEDURE — 3008F BODY MASS INDEX DOCD: CPT | Performed by: INTERNAL MEDICINE

## 2022-04-21 PROCEDURE — 99214 OFFICE O/P EST MOD 30 MIN: CPT | Performed by: INTERNAL MEDICINE

## 2022-04-21 PROCEDURE — 1036F TOBACCO NON-USER: CPT | Performed by: INTERNAL MEDICINE

## 2022-04-21 RX ORDER — FAMOTIDINE 40 MG/1
40 TABLET, FILM COATED ORAL
Qty: 30 TABLET | Refills: 3 | Status: SHIPPED | OUTPATIENT
Start: 2022-04-21

## 2022-04-21 NOTE — LETTER
April 21, 2022     Beto Askew, [address] [address]    Patient: Luis Parada   YOB: 1965   Date of Visit: 4/21/2022       Dear Dr Bud Canavan: Thank you for referring Rubi Silveira to me for evaluation  Below are my notes for this consultation  If you have questions, please do not hesitate to call me  I look forward to following your patient along with you  Sincerely,        Andrew Montana MD        CC: No Recipients  Andrew Montana MD  4/21/2022  4:08 PM  Sign when Signing Visit  [facility]  Progress Note - Luis Parada 61 y o  male MRN: [id number]    Unit/Bed#:  Encounter: [id number]    Assessment/Plan:    1  NAFLD-no recent labs  Would recommend checking liver enzymes at this time  Continue to work on weight loss  2  Gastritis-reports that he has been inconsistently taking omeprazole 40 mg 2 or 3 times a week on as-needed basis  Given no evidence of Scruggs's esophagus or intestinal metaplasia on EGD last year, can hold off on continue with PPI  Will switch to Pepcid instead  Patient reports that his symptoms of acid reflux are typically in the evenings therefore would recommend famotidine 40 mg to be taken before dinner  His symptoms are predominantly when he eats late at night  3  Colon cancer screen-up-to-date, had several tubular adenomatous polyps, patient will be due for repeat colonoscopy in 2023      4  Follow-up in 6 months  Subjective:   26-year-old male with history of fatty liver disease, GERD, presents for follow-up  Patient was seen last year for elevated liver enzymes  He underwent liver biopsy which was notable for stat hepatitis with patchy periportal pericellular fibrosis  He has not had any blood work since last year  No recent liver enzymes    Patient reports that he has been doing fairly well from standpoint of acid reflux on most days however occasionally has symptoms of both acid reflux and left upper quadrant abdominal pain which is typically precipitated the eating late at night or with greasy foods  He reports that he takes omeprazole on as-needed basis, reports that he needs at least 2 or 3 times a week  He denies any dysphagia, odynophagia, loss of appetite or early satiety  No melena  Reports that his bowel movements are fairly regular  No other symptoms  EGD in 2020 was notable for moderate gastritis  Biopsies from the stomach were negative for H pylori and intestinal metaplasia  Duodenal biopsies were notable for increased intraepithelial lymphocytes however celiac serologies were unremarkable  He also underwent colonoscopy at the same time which was notable for several tubular adenomatous polyps  He was subsequently recommended repeat colonoscopy in 3 year interval   Liver biopsy last year was notable for steatohepatitis with patchy periportal pericellular fibrosis  He has not had any blood work for liver enzymes for almost 1 year  MRI of the last year was notable for mild hepatomegaly with loss of signal on out of phase consistent with fatty infiltration without any suspicious lesions  Pancreas appeared normal, no pancreatic ductal dilatation was noted  year  Objective:     Vitals: Blood pressure 131/87, pulse 74, temperature 97 9 °F (36 6 °C), height 5' 7" (1 702 m), weight 86 6 kg (191 lb), SpO2 97 %  ,Body mass index is 29 91 kg/m²  [unfilled]    Physical Exam:    GEN: wn/wd, NAD  HEENT: MMM, no cervical or supraclavicular LAD, anciteric  CV: RRR, no m/r/g  CHEST: CTA b/l, no w/r/r  ABD: +BS, soft, NT/ND, no hepatosplenomegaly  EXT: no c/c/e  SKIN: no rashes  NEURO: aaox3      Invasive Devices  Report    None                         Lab, Imaging and other studies:     No visits with results within 1 Day(s) from this visit     Latest known visit with results is:   Hospital Outpatient Visit on 05/17/2021   Component Date Value    Case Report 05/17/2021                      Value:Surgical Pathology Report                         Case: [id number]                                   Authorizing Provider:  Ken Mathur MD       Collected:           05/17/2021 1035              Ordering Location:     [address] [place] [address] Received:            05/17/2021 1051                                     Cardiac Cath Lab                                                             Pathologist:           Kellee Choe MD                                                                 Specimen:    Liver, random                                                                              Final Diagnosis 05/17/2021                      Value: This result contains rich text formatting which cannot be displayed here   Microscopic Description 05/17/2021                      Value: This result contains rich text formatting which cannot be displayed here   Note 05/17/2021                      Value: This result contains rich text formatting which cannot be displayed here   Additional Information 05/17/2021                      Value: This result contains rich text formatting which cannot be displayed here  [employer] Description 05/17/2021                      Value: This result contains rich text formatting which cannot be displayed here   Clinical Information 05/17/2021                      Value:increased LFTs         I have personally reviewed pertinent films in PACS    No current facility-administered medications for this visit

## 2022-04-21 NOTE — PROGRESS NOTES
[facility]  Progress Note - Felix Vincenzo 61 y o  male MRN: [id number]    Unit/Bed#:  Encounter: [id number]    Assessment/Plan:    1  NAFLD-no recent labs  Would recommend checking liver enzymes at this time  Continue to work on weight loss  2  Gastritis-reports that he has been inconsistently taking omeprazole 40 mg 2 or 3 times a week on as-needed basis  Given no evidence of Scruggs's esophagus or intestinal metaplasia on EGD last year, can hold off on continue with PPI  Will switch to Pepcid instead  Patient reports that his symptoms of acid reflux are typically in the evenings therefore would recommend famotidine 40 mg to be taken before dinner  His symptoms are predominantly when he eats late at night  3  Colon cancer screen-up-to-date, had several tubular adenomatous polyps, patient will be due for repeat colonoscopy in 2023      4  Follow-up in 6 months  Subjective:   62-year-old male with history of fatty liver disease, GERD, presents for follow-up  Patient was seen last year for elevated liver enzymes  He underwent liver biopsy which was notable for stat hepatitis with patchy periportal pericellular fibrosis  He has not had any blood work since last year  No recent liver enzymes  Patient reports that he has been doing fairly well from standpoint of acid reflux on most days however occasionally has symptoms of both acid reflux and left upper quadrant abdominal pain which is typically precipitated the eating late at night or with greasy foods  He reports that he takes omeprazole on as-needed basis, reports that he needs at least 2 or 3 times a week  He denies any dysphagia, odynophagia, loss of appetite or early satiety  No melena  Reports that his bowel movements are fairly regular  No other symptoms  EGD in 2020 was notable for moderate gastritis  Biopsies from the stomach were negative for H pylori and intestinal metaplasia    Duodenal biopsies were notable for increased intraepithelial lymphocytes however celiac serologies were unremarkable  He also underwent colonoscopy at the same time which was notable for several tubular adenomatous polyps  He was subsequently recommended repeat colonoscopy in 3 year interval   Liver biopsy last year was notable for steatohepatitis with patchy periportal pericellular fibrosis  He has not had any blood work for liver enzymes for almost 1 year  MRI of the last year was notable for mild hepatomegaly with loss of signal on out of phase consistent with fatty infiltration without any suspicious lesions  Pancreas appeared normal, no pancreatic ductal dilatation was noted  year  Objective:     Vitals: Blood pressure 131/87, pulse 74, temperature 97 9 °F (36 6 °C), height 5' 7" (1 702 m), weight 86 6 kg (191 lb), SpO2 97 %  ,Body mass index is 29 91 kg/m²  [unfilled]    Physical Exam:    GEN: wn/wd, NAD  HEENT: MMM, no cervical or supraclavicular LAD, anciteric  CV: RRR, no m/r/g  CHEST: CTA b/l, no w/r/r  ABD: +BS, soft, NT/ND, no hepatosplenomegaly  EXT: no c/c/e  SKIN: no rashes  NEURO: aaox3      Invasive Devices  Report    None                         Lab, Imaging and other studies:     No visits with results within 1 Day(s) from this visit     Latest known visit with results is:   Hospital Outpatient Visit on 05/17/2021   Component Date Value    Case Report 05/17/2021                      Value:Surgical Pathology Report                         Case: [id number]                                   Authorizing Provider:  Chicho Garay MD       Collected:           05/17/2021 1035              Ordering Location:     [address] [place] [address] Received:            05/17/2021 1051                                     Cardiac Cath Lab                                                             Pathologist:           Rohit Troncoso MD                                                                 Specimen:    Liver, random  Final Diagnosis 05/17/2021                      Value: This result contains rich text formatting which cannot be displayed here   Microscopic Description 05/17/2021                      Value: This result contains rich text formatting which cannot be displayed here   Note 05/17/2021                      Value: This result contains rich text formatting which cannot be displayed here   Additional Information 05/17/2021                      Value: This result contains rich text formatting which cannot be displayed here  [facility] Gross Description 05/17/2021                      Value: This result contains rich text formatting which cannot be displayed here   Clinical Information 05/17/2021                      Value:increased LFTs         I have personally reviewed pertinent films in PACS    No current facility-administered medications for this visit

## 2022-05-09 ENCOUNTER — OFFICE VISIT (OUTPATIENT)
Dept: FAMILY MEDICINE CLINIC | Facility: CLINIC | Age: 57
End: 2022-05-09
Payer: COMMERCIAL

## 2022-05-09 VITALS
DIASTOLIC BLOOD PRESSURE: 74 MMHG | TEMPERATURE: 97.3 F | HEART RATE: 85 BPM | BODY MASS INDEX: 29.85 KG/M2 | WEIGHT: 190.2 LBS | RESPIRATION RATE: 18 BRPM | HEIGHT: 67 IN | SYSTOLIC BLOOD PRESSURE: 130 MMHG | OXYGEN SATURATION: 97 %

## 2022-05-09 DIAGNOSIS — J34.89 NASAL MUCOSA DRY: ICD-10-CM

## 2022-05-09 DIAGNOSIS — K21.9 GASTROESOPHAGEAL REFLUX DISEASE WITHOUT ESOPHAGITIS: ICD-10-CM

## 2022-05-09 DIAGNOSIS — Z11.4 SCREENING FOR HIV (HUMAN IMMUNODEFICIENCY VIRUS): ICD-10-CM

## 2022-05-09 DIAGNOSIS — Z00.00 ANNUAL PHYSICAL EXAM: Primary | ICD-10-CM

## 2022-05-09 PROCEDURE — 1036F TOBACCO NON-USER: CPT | Performed by: FAMILY MEDICINE

## 2022-05-09 PROCEDURE — 3725F SCREEN DEPRESSION PERFORMED: CPT | Performed by: FAMILY MEDICINE

## 2022-05-09 PROCEDURE — 99396 PREV VISIT EST AGE 40-64: CPT | Performed by: FAMILY MEDICINE

## 2022-05-09 PROCEDURE — 3008F BODY MASS INDEX DOCD: CPT | Performed by: FAMILY MEDICINE

## 2022-05-09 NOTE — PATIENT INSTRUCTIONS
Wellness Visit for Adults   AMBULATORY CARE:   A wellness visit  is when you see your healthcare provider to get screened for health problems  Your healthcare provider will also give you advice on how to stay healthy  Write down your questions so you remember to ask them  Ask your healthcare provider how often you should have a wellness visit  What happens at a wellness visit:  Your healthcare provider will ask about your health, and your family history of health problems  This includes high blood pressure, heart disease, and cancer  He or she will ask if you have symptoms that concern you, if you smoke, and about your mood  You may also be asked about your intake of medicines, supplements, food, and alcohol  Any of the following may be done:  · Your weight  will be checked  Your height may also be checked so your body mass index (BMI) can be calculated  Your BMI shows if you are at a healthy weight  · Your blood pressure  and heart rate will be checked  Your temperature may also be checked  · Blood and urine tests  may be done  Blood tests may be done to check your cholesterol levels  Abnormal cholesterol levels increase your risk for heart disease and stroke  You may also need a blood or urine test to check for diabetes if you are at increased risk  Urine tests may be done to look for signs of an infection or kidney disease  · A physical exam  includes checking your heartbeat and lungs with a stethoscope  Your healthcare provider may also check your skin to look for sun damage  · Screening tests  may be recommended  A screening test is done to check for diseases that may not cause symptoms  The screening tests you may need depend on your age, gender, family history, and lifestyle habits  For example, colorectal screening may be recommended if you are 48years old or older  Screening tests you need if you are a woman:   · A Pap smear  is used to screen for cervical cancer   Pap smears are usually done every 3 to 5 years depending on your age  You may need them more often if you have had abnormal Pap smear test results in the past  Ask your healthcare provider how often you should have a Pap smear  · A mammogram  is an x-ray of your breasts to screen for breast cancer  Experts recommend mammograms every 2 years starting at age 48 years  You may need a mammogram at age 52 years or younger if you have an increased risk for breast cancer  Talk to your healthcare provider about when you should start having mammograms and how often you need them  Vaccines you may need:   · Get an influenza vaccine  every year  The influenza vaccine protects you from the flu  Several types of viruses cause the flu  The viruses change over time, so new vaccines are made each year  · Get a tetanus-diphtheria (Td) booster vaccine  every 10 years  This vaccine protects you against tetanus and diphtheria  Tetanus is a severe infection that may cause painful muscle spasms and lockjaw  Diphtheria is a severe bacterial infection that causes a thick covering in the back of your mouth and throat  · Get a human papillomavirus (HPV) vaccine  if you are female and aged 23 to 32 or male 23 to 24 and never received it  This vaccine protects you from HPV infection  HPV is the most common infection spread by sexual contact  HPV may also cause vaginal, penile, and anal cancers  · Get a pneumococcal vaccine  if you are aged 72 years or older  The pneumococcal vaccine is an injection given to protect you from pneumococcal disease  Pneumococcal disease is an infection caused by pneumococcal bacteria  The infection may cause pneumonia, meningitis, or an ear infection  · Get a shingles vaccine  if you are 60 or older, even if you have had shingles before  The shingles vaccine is an injection to protect you from the varicella-zoster virus  This is the same virus that causes chickenpox   Shingles is a painful rash that develops in people who had chickenpox or have been exposed to the virus  How to eat healthy:  My Plate is a model for planning healthy meals  It shows the types and amounts of foods that should go on your plate  Fruits and vegetables make up about half of your plate, and grains and protein make up the other half  A serving of dairy is included on the side of your plate  The amount of calories and serving sizes you need depends on your age, gender, weight, and height  Examples of healthy foods are listed below:  · Eat a variety of vegetables  such as dark green, red, and orange vegetables  You can also include canned vegetables low in sodium (salt) and frozen vegetables without added butter or sauces  · Eat a variety of fresh fruits , canned fruit in 100% juice, frozen fruit, and dried fruit  · Include whole grains  At least half of the grains you eat should be whole grains  Examples include whole-wheat bread, wheat pasta, brown rice, and whole-grain cereals such as oatmeal     · Eat a variety of protein foods such as seafood (fish and shellfish), lean meat, and poultry without skin (turkey and chicken)  Examples of lean meats include pork leg, shoulder, or tenderloin, and beef round, sirloin, tenderloin, and extra lean ground beef  Other protein foods include eggs and egg substitutes, beans, peas, soy products, nuts, and seeds  · Choose low-fat dairy products such as skim or 1% milk or low-fat yogurt, cheese, and cottage cheese  · Limit unhealthy fats  such as butter, hard margarine, and shortening  Exercise:  Exercise at least 30 minutes per day on most days of the week  Some examples of exercise include walking, biking, dancing, and swimming  You can also fit in more physical activity by taking the stairs instead of the elevator or parking farther away from stores  Include muscle strengthening activities 2 days each week  Regular exercise provides many health benefits   It helps you manage your weight, and decreases your risk for type 2 diabetes, heart disease, stroke, and high blood pressure  Exercise can also help improve your mood  Ask your healthcare provider about the best exercise plan for you  General health and safety guidelines:   · Do not smoke  Nicotine and other chemicals in cigarettes and cigars can cause lung damage  Ask your healthcare provider for information if you currently smoke and need help to quit  E-cigarettes or smokeless tobacco still contain nicotine  Talk to your healthcare provider before you use these products  · Limit alcohol  A drink of alcohol is 12 ounces of beer, 5 ounces of wine, or 1½ ounces of liquor  · Lose weight, if needed  Being overweight increases your risk of certain health conditions  These include heart disease, high blood pressure, type 2 diabetes, and certain types of cancer  · Protect your skin  Do not sunbathe or use tanning beds  Use sunscreen with a SPF 15 or higher  Apply sunscreen at least 15 minutes before you go outside  Reapply sunscreen every 2 hours  Wear protective clothing, hats, and sunglasses when you are outside  · Drive safely  Always wear your seatbelt  Make sure everyone in your car wears a seatbelt  A seatbelt can save your life if you are in an accident  Do not use your cell phone when you are driving  This could distract you and cause an accident  Pull over if you need to make a call or send a text message  · Practice safe sex  Use latex condoms if are sexually active and have more than one partner  Your healthcare provider may recommend screening tests for sexually transmitted infections (STIs)  · Wear helmets, lifejackets, and protective gear  Always wear a helmet when you ride a bike or motorcycle, go skiing, or play sports that could cause a head injury  Wear protective equipment when you play sports  Wear a lifejacket when you are on a boat or doing water sports      © Copyright [employer] 2022 Information is for End User's use only and may not be sold, redistributed or otherwise used for commercial purposes  All illustrations and images included in CareNotes® are the copyrighted property of [employer]  or [employer]  The above information is an  only  It is not intended as medical advice for individual conditions or treatments  Talk to your doctor, nurse or pharmacist before following any medical regimen to see if it is safe and effective for you  Weight Management   AMBULATORY CARE:   Why it is important to manage your weight:  Being overweight increases your risk of health conditions such as heart disease, high blood pressure, type 2 diabetes, and certain types of cancer  It can also increase your risk for osteoarthritis, sleep apnea, and other respiratory problems  Aim for a slow, steady weight loss  Even a small amount of weight loss can lower your risk of health problems  Risks of being overweight:  Extra weight can cause many health problems, including the following:  · Diabetes (high blood sugar level)    · High blood pressure or high cholesterol    · Heart disease    · Stroke    · Gallbladder or liver disease    · Cancer of the colon, breast, prostate, liver, or kidney    · Sleep apnea    · Arthritis or gout    Screening  is done to check for health conditions before you have signs or symptoms  If you are 28to 79years old, your blood sugar level may be checked every 3 years for signs of prediabetes or diabetes  Your healthcare provider will check your blood pressure at each visit  High blood pressure can lead to a stroke or other problems  Your provider may check for signs of heart disease, cancer, or other health problems  How to lose weight safely:  A safe and healthy way to lose weight is to eat fewer calories and get regular exercise  · You can lose up about 1 pound a week by decreasing the number of calories you eat by 500 calories each day   You can decrease calories by eating smaller portion sizes or by cutting out high-calorie foods  Read labels to find out how many calories are in the foods you eat  · You can also burn calories with exercise such as walking, swimming, or biking  You will be more likely to keep weight off if you make these changes part of your lifestyle  Exercise at least 30 minutes per day on most days of the week  You can also fit in more physical activity by taking the stairs instead of the elevator or parking farther away from stores  Ask your healthcare provider about the best exercise plan for you  Healthy meal plan for weight management:  A healthy meal plan includes a variety of foods, contains fewer calories, and helps you stay healthy  A healthy meal plan includes the following:     · Eat whole-grain foods more often  A healthy meal plan should contain fiber  Fiber is the part of grains, fruits, and vegetables that is not broken down by your body  Whole-grain foods are healthy and provide extra fiber in your diet  Some examples of whole-grain foods are whole-wheat breads and pastas, oatmeal, brown rice, and bulgur  · Eat a variety of vegetables every day  Include dark, leafy greens such as spinach, kale, jessica greens, and mustard greens  Eat yellow and orange vegetables such as carrots, sweet potatoes, and winter squash  · Eat a variety of fruits every day  Choose fresh or canned fruit (canned in its own juice or light syrup) instead of juice  Fruit juice has very little or no fiber  · Eat low-fat dairy foods  Drink fat-free (skim) milk or 1% milk  Eat fat-free yogurt and low-fat cottage cheese  Try low-fat cheeses such as mozzarella and other reduced-fat cheeses  · Choose meat and other protein foods that are low in fat  Choose beans or other legumes such as split peas or lentils  Choose fish, skinless poultry (chicken or turkey), or lean cuts of red meat (beef or pork)   Before you cook meat or poultry, cut off any visible fat  · Use less fat and oil  Try baking foods instead of frying them  Add less fat, such as margarine, sour cream, regular salad dressing and mayonnaise to foods  Eat fewer high-fat foods  Some examples of high-fat foods include french fries, doughnuts, ice cream, and cakes  · Eat fewer sweets  Limit foods and drinks that are high in sugar  This includes candy, cookies, regular soda, and sweetened drinks  Ways to decrease calories:   · Eat smaller portions  ? Use a small plate with smaller servings  ? Do not eat second helpings  ? When you eat at a restaurant, ask for a box and place half of your meal in the box before you eat  ? Share an entrée with someone else  · Replace high-calorie snacks with healthy, low-calorie snacks  ? Choose fresh fruit, vegetables, fat-free rice cakes, or air-popped popcorn instead of potato chips, nuts, or chocolate  ? Choose water or calorie-free drinks instead of soda or sweetened drinks  · Do not shop for groceries when you are hungry  You may be more likely to make unhealthy food choices  Take a grocery list of healthy foods and shop after you have eaten  · Eat regular meals  Do not skip meals  Skipping meals can lead to overeating later in the day  This can make it harder for you to lose weight  Eat a healthy snack in place of a meal if you do not have time to eat a regular meal  Talk with a dietitian to help you create a meal plan and schedule that is right for you  Other things to consider as you try to lose weight:   · Be aware of situations that may give you the urge to overeat, such as eating while watching television  Find ways to avoid these situations  For example, read a book, go for a walk, or do crafts  · Meet with a weight loss support group or friends who are also trying to lose weight  This may help you stay motivated to continue working on your weight loss goals      © Copyright [employer] 2022 Information is for End User's use only and may not be sold, redistributed or otherwise used for commercial purposes  All illustrations and images included in CareNotes® are the copyrighted property of [employer]  or [employer]  The above information is an  only  It is not intended as medical advice for individual conditions or treatments  Talk to your doctor, nurse or pharmacist before following any medical regimen to see if it is safe and effective for you  GERD (Gastroesophageal Reflux Disease)   WHAT YOU NEED TO KNOW:   Gastroesophageal reflux disease (GERD) is reflux that happens more than 2 times a week for a few weeks  Reflux means acid and food in your stomach back up into your esophagus  GERD can cause other health problems over time if it is not treated  DISCHARGE INSTRUCTIONS:   Call your local emergency number (911 in the [address] [facility]) if:   You have severe chest pain and sudden trouble breathing  Return to the emergency department if:   You have trouble breathing after you vomit  You have trouble swallowing, or pain with swallowing  Your bowel movements are black, bloody, or tarry-looking  Your vomit looks like coffee grounds or has blood in it  Call your doctor or gastroenterologist if:   You feel full and cannot burp or vomit  You vomit large amounts, or you vomit often  You are losing weight without trying  Your symptoms get worse or do not improve with treatment  You have questions or concerns about your condition or care  Medicines:   Medicines  are used to decrease stomach acid  Medicine may also be used to help your lower esophageal sphincter and stomach contract (tighten) more  Take your medicine as directed  Contact your healthcare provider if you think your medicine is not helping or if you have side effects  Tell him of her if you are allergic to any medicine  Keep a list of the medicines, vitamins, and herbs you take   Include the amounts, and when and why you take them  Bring the list or the pill bottles to follow-up visits  Carry your medicine list with you in case of an emergency  Manage GERD:       Do not have foods or drinks that may increase heartburn  These include chocolate, peppermint, fried or fatty foods, drinks that contain caffeine, or carbonated drinks (soda)  Other foods include spicy foods, onions, tomatoes, and tomato-based foods  Do not have foods or drinks that can irritate your esophagus, such as citrus fruits, juices, and alcohol  Do not eat large meals  When you eat a lot of food at one time, your stomach needs more acid to digest it  Eat 6 small meals each day instead of 3 large ones, and eat slowly  Do not eat meals 2 to 3 hours before bedtime  Elevate the head of your bed  Place 6-inch blocks under the head of your bed frame  You may also use more than one pillow under your head and shoulders while you sleep  Maintain a healthy weight  If you are overweight, weight loss may help relieve symptoms of GERD  Do not smoke  Smoking weakens the lower esophageal sphincter and increases the risk of GERD  Ask your healthcare provider for information if you currently smoke and need help to quit  E-cigarettes or smokeless tobacco still contain nicotine  Talk to your healthcare provider before you use these products  Do not put pressure on your abdomen  Pressure pushes acid up into your esophagus  Do not wear clothing that is tight around your waist  Do not bend over  Bend at the knees if you need to pick something up  Follow up with your doctor or gastroenterologist as directed:  Write down your questions so you remember to ask them during your visits  © Copyright [employer] 2022 Information is for End User's use only and may not be sold, redistributed or otherwise used for commercial purposes   All illustrations and images included in CareNotes® are the copyrighted property of [employer]  or [employer]  The above information is an  only  It is not intended as medical advice for individual conditions or treatments  Talk to your doctor, nurse or pharmacist before following any medical regimen to see if it is safe and effective for you

## 2022-05-09 NOTE — PROGRESS NOTES
[address] [facility]    NAME: Zainab Guzmán  AGE: 61 y o  SEX: male  : 1965     DATE: 2022     Assessment and Plan:     Problem List Items Addressed This Visit        Digestive    Gastroesophageal reflux disease without esophagitis      Other Visit Diagnoses     Annual physical exam    -  Primary    Screening for HIV (human immunodeficiency virus)        Relevant Orders    HIV 1/2 Antigen/Antibody (4th Generation) w Reflex [facility]    Nasal mucosa dry        Relevant Orders    Ambulatory Referral to Otolaryngology    BMI 29 0-29 9,adult        Relevant Orders    Comprehensive metabolic panel    Lipid panel        · GERD: no dysphagia, no weight loss, pain/burning sensation not present on exertion and only present when laying down, likely secondary to GERD/Reflux  Recommend use of Pepcid PRN when having symptoms  Avoid foods that trigger or worsen symptoms  Avoid eating late at night  Avoid laying down too soon after eating  RTO if symptoms worsen or do not improve  · Nasal mucosa dry: will refer to ENT as requested by patient  Immunizations and preventive care screenings were discussed with patient today  Appropriate education was printed on patient's after visit summary  Counseling:  Dental Health: discussed importance of regular tooth brushing, flossing, and dental visits  · Exercise: the importance of regular exercise/physical activity was discussed  Recommend exercise 3-5 times per week for at least 30 minutes  Return in 1 year (on 2023)  Chief Complaint:     Chief Complaint   Patient presents with    Physical Exam      History of Present Illness:     Adult Annual Physical   Patient here for a comprehensive physical exam  The patient reports the following:  Heartburn  He complains of belching, chest pain (burning/sharp sensation only when lying down) and heartburn   He reports no abdominal pain, no dysphagia, no early satiety or no nausea  burning sensation in throat/reflux  bloated sensation  no vomiting  no shortness of breath  This is a chronic problem  Episode onset: more than a year ago  Episode frequency: every 5 days  The problem has been unchanged  Heartburn duration: around 3 minutes  The heartburn is located in the substernum  The heartburn is of mild intensity  The heartburn does not wake him from sleep  The heartburn changes (resolves after standing from lying down) with position  The symptoms are aggravated by lying down (unsure if related to food  reports his burning sensation is not worsened nor caused by exertion  can walk up stairs and exert himself without chest pain)  Pertinent negatives include no weight loss  He has tried a histamine-2 antagonist, a PPI and head elevation (not taking PPI any longer, takes pepcid 40 HS PRN but takes it inconsistently, was told by the GI doctor to take it if he eats late at night, but he forgets to take it) for the symptoms  Improvement on treatment: pepcid helps, head elevation somewhat helps but hurts his neck and upper back  Past procedures include an EGD  Nasal irritation/dryness  Chronic, reports he has had irritation/dryness in his left nasal mucosa for more than a year  He has seen ENT before for this  He would like a referral to see them again  Diet and Physical Activity  · Diet/Nutrition: well balanced diet  · Exercise: no formal exercise  Depression Screening  PHQ-2/9 Depression Screening    Little interest or pleasure in doing things: 0 - not at all  Feeling down, depressed, or hopeless: 0 - not at all  PHQ-2 Score: 0  PHQ-2 Interpretation: Negative depression screen       General Health  · Sleep: sleeps well  · Hearing: normal - bilateral   · Vision: wears glasses  · Dental: regular dental visits and brushes teeth twice daily          Health  · Symptoms include: none     Review of Systems:     Review of Systems   Constitutional: Negative for weight loss  Cardiovascular: Positive for chest pain (burning/sharp sensation only when lying down)  Gastrointestinal: Positive for heartburn  Negative for abdominal pain, dysphagia and nausea  Past Medical History:     Past Medical History:   Diagnosis Date    Abnormal weight gain     Last assessed 3/24/2014    Allergic     Anxiety     Gastroesophageal reflux disease without esophagitis 3/2/2020    GERD (gastroesophageal reflux disease)     Hemorrhoids     History of gastroesophageal reflux (GERD)     Hyperlipidemia     Last assessed 3/24/2014     Sleep apnea     insurance not covering      Past Surgical History:     Past Surgical History:   Procedure Laterality Date    COLONOSCOPY      IR BIOPSY LIVER RANDOM  5/17/2021    IA PALATOPHAYNGOPLASTY N/A 3/13/2020    Procedure: UVULOPALATOPHARYNGOPLASTY (UPPP);   Surgeon: Zoie Nina MD;  Location: [facility];  Service: ENT    TONSILLECTOMY  03/16/2020    UVULECTOMY  03/16/2020      Family History:     Family History   Problem Relation Age of Onset    Hypertension Mother     No Known Problems Father     COPD Maternal Uncle     No Known Problems Sister     No Known Problems Brother       Social History:     Social History     Socioeconomic History    Marital status: /Civil Union     Spouse name: None    Number of children: None    Years of education: None    Highest education level: None   Occupational History    None   Tobacco Use    Smoking status: Never Smoker    Smokeless tobacco: Never Used   Vaping Use    Vaping Use: Never used   Substance and Sexual Activity    Alcohol use: No    Drug use: No    Sexual activity: Yes     Comment: Sexually active, high risk - As per [employer]    Other Topics Concern    None   Social History Narrative    None     Social Determinants of Health     Financial Resource Strain: Not on file   Food Insecurity: Not on file   Transportation Needs: Not on file   Physical Activity: Not on file Stress: Not on file   Social Connections: Not on file   Intimate Partner Violence: Not on file   Housing Stability: Not on file      Current Medications:     Current Outpatient Medications   Medication Sig Dispense Refill    famotidine (PEPCID) 40 MG tablet Take 1 tablet (40 mg total) by mouth daily at bedtime 30 tablet 3    fluticasone (FLONASE) 50 mcg/act nasal spray 1 spray into each nostril daily 18 2 mL 1    ibuprofen (MOTRIN) 600 mg tablet Take 1 tablet (600 mg total) by mouth every 6 (six) hours as needed for mild pain 30 tablet 0    loratadine (CLARITIN) 10 mg tablet Take 1 tablet (10 mg total) by mouth daily 30 tablet 1    omeprazole (PriLOSEC) 40 MG capsule Take 1 capsule (40 mg total) by mouth daily 30 capsule 3    sodium chloride (OCEAN) 0 65 % nasal spray 1 spray into each nostril as needed for congestion or rhinitis (Patient not taking: Reported on 3/31/2022 ) 30 mL 0     No current facility-administered medications for this visit  Allergies:     No Known Allergies   Physical Exam:     /74 (BP Location: Left arm, Patient Position: Sitting, Cuff Size: Adult)   Pulse 85   Temp (!) 97 3 °F (36 3 °C) (Tympanic)   Resp 18   Ht 5' 7" (1 702 m)   Wt 86 3 kg (190 lb 3 2 oz)   SpO2 97%   BMI 29 79 kg/m²     Physical Exam  Vitals and nursing note reviewed  Constitutional:       General: He is awake  He is not in acute distress  Appearance: He is well-developed  HENT:      Head: Normocephalic and atraumatic  Right Ear: Tympanic membrane and ear canal normal       Left Ear: Tympanic membrane and ear canal normal       Nose: No rhinorrhea  Right Nostril: No occlusion  Left Nostril: No occlusion  Right Turbinates: Not enlarged or pale  Left Turbinates: Not enlarged or pale  Comments: Nasal mucosa irritation/dryness on nasal midline of left nostril     Mouth/Throat:      Mouth: Mucous membranes are moist       Pharynx: Oropharynx is clear   No pharyngeal swelling, oropharyngeal exudate or posterior oropharyngeal erythema  Neck:      Thyroid: No thyroid mass, thyromegaly or thyroid tenderness  Vascular: No carotid bruit  Cardiovascular:      Rate and Rhythm: Normal rate and regular rhythm  Heart sounds: Normal heart sounds, S1 normal and S2 normal    Pulmonary:      Effort: Pulmonary effort is normal  No respiratory distress  Breath sounds: Normal breath sounds and air entry  No decreased breath sounds, wheezing, rhonchi or rales  Abdominal:      General: Abdomen is flat  Bowel sounds are normal       Palpations: Abdomen is soft  Tenderness: There is no abdominal tenderness  There is no guarding or rebound  Musculoskeletal:      Cervical back: Neck supple  Lymphadenopathy:      Cervical: No cervical adenopathy  Right cervical: No superficial or posterior cervical adenopathy  Left cervical: No superficial or posterior cervical adenopathy  Skin:     General: Skin is warm and dry  Neurological:      Mental Status: He is alert  Psychiatric:         Behavior: Behavior is cooperative  Maryse Dimas MD  [facility]  BMI Counseling: Body mass index is 29 79 kg/m²  The BMI is above normal  Nutrition recommendations include reducing portion sizes, decreasing overall calorie intake, 3-5 servings of fruits/vegetables daily, reducing fast food intake, consuming healthier snacks, decreasing soda and/or juice intake and moderation in carbohydrate intake  Exercise recommendations include moderate aerobic physical activity for 150 minutes/week and exercising 3-5 times per week

## 2022-05-12 ENCOUNTER — APPOINTMENT (OUTPATIENT)
Dept: LAB | Facility: HOSPITAL | Age: 57
End: 2022-05-12
Payer: COMMERCIAL

## 2022-05-12 DIAGNOSIS — K76.0 NAFLD (NONALCOHOLIC FATTY LIVER DISEASE): Primary | ICD-10-CM

## 2022-05-12 DIAGNOSIS — Z11.4 SCREENING FOR HIV (HUMAN IMMUNODEFICIENCY VIRUS): ICD-10-CM

## 2022-06-06 DIAGNOSIS — J31.0 CHRONIC RHINITIS: ICD-10-CM

## 2022-06-06 RX ORDER — BENZOYL PEROXIDE
KIT TOPICAL
Qty: 30 TABLET | Refills: 0 | Status: SHIPPED | OUTPATIENT
Start: 2022-06-06

## 2022-06-23 ENCOUNTER — OFFICE VISIT (OUTPATIENT)
Dept: OTOLARYNGOLOGY | Facility: CLINIC | Age: 57
End: 2022-06-23
Payer: COMMERCIAL

## 2022-06-23 VITALS — BODY MASS INDEX: 30.61 KG/M2 | HEIGHT: 67 IN | WEIGHT: 195 LBS | TEMPERATURE: 97.6 F

## 2022-06-23 DIAGNOSIS — J31.0 CHRONIC RHINITIS: ICD-10-CM

## 2022-06-23 DIAGNOSIS — J34.89 NASAL MUCOSA DRY: ICD-10-CM

## 2022-06-23 DIAGNOSIS — J34.89 SINUS PRESSURE: Primary | ICD-10-CM

## 2022-06-23 DIAGNOSIS — J34.2 DNS (DEVIATED NASAL SEPTUM): ICD-10-CM

## 2022-06-23 DIAGNOSIS — J34.89 NASAL VESTIBULITIS: ICD-10-CM

## 2022-06-23 PROCEDURE — 3008F BODY MASS INDEX DOCD: CPT | Performed by: OTOLARYNGOLOGY

## 2022-06-23 PROCEDURE — 1036F TOBACCO NON-USER: CPT | Performed by: OTOLARYNGOLOGY

## 2022-06-23 PROCEDURE — 31231 NASAL ENDOSCOPY DX: CPT | Performed by: OTOLARYNGOLOGY

## 2022-06-23 PROCEDURE — 99214 OFFICE O/P EST MOD 30 MIN: CPT | Performed by: OTOLARYNGOLOGY

## 2022-06-23 RX ORDER — FLUTICASONE PROPIONATE 50 MCG
2 SPRAY, SUSPENSION (ML) NASAL DAILY
Qty: 18.2 ML | Refills: 11 | Status: SHIPPED | OUTPATIENT
Start: 2022-06-23

## 2022-06-23 NOTE — PROGRESS NOTES
Assessment/Plan: It sounds like the patient is suffering from an unresolved nasal vestibulitis  Trial of Bactroban ointment  F/u 2 wks  No problem-specific Assessment & Plan notes found for this encounter  Diagnoses and all orders for this visit:    Sinus pressure    Nasal mucosa dry  -     Ambulatory Referral to Otolaryngology    Nasal vestibulitis  -     mupirocin (BACTROBAN) 2 % nasal ointment; into each nostril 2 (two) times a day    DNS (deviated nasal septum)          Subjective:      Patient ID: Arminda Lay is a 61 y o  male  Pt c/o chronic sinus pressure and yellow nasal discharge, mainly on the left  He also c/o chronic focal crusting inside his nostril on the left  He tried using Neosporin ointment in his nostrils x 3 months, without improvement  His last CT of the sinuses was 2 yrs ago, which showed mainly clear sinuses, though some scattered minor areas of mucosal thickening  The following portions of the patient's history were reviewed and updated as appropriate: allergies, current medications, past family history, past medical history, past social history, past surgical history and problem list     Review of Systems      Objective:      Temp 97 6 °F (36 4 °C) (Temporal)   Ht 5' 7" (1 702 m)   Wt 88 5 kg (195 lb)   BMI 30 54 kg/m²          Physical Exam  Constitutional:       Appearance: He is well-developed  HENT:      Head: Normocephalic and atraumatic  Right Ear: Tympanic membrane, ear canal and external ear normal  No drainage  No middle ear effusion  Left Ear: Tympanic membrane, ear canal and external ear normal  No drainage  No middle ear effusion  Nose: Nose normal       Mouth/Throat:      Pharynx: Uvula midline  No oropharyngeal exudate  Tonsils: 0 on the right  0 on the left  Neck:      Thyroid: No thyroid mass or thyromegaly  Trachea: Trachea normal  No tracheal deviation  Lymphadenopathy:      Cervical: No cervical adenopathy  Neurological:      Mental Status: He is alert  Flexible Fiberoptic Nasal Endoscopy Procedure Note:  Indication:  Sinus pressure  Verbal consent obtained  Surgeon: Toña Stallings MD  Anesthesia: 4% lidocaine, oxymetazoline  Scope passed through nasal cavities bilaterally    Nasopharynx: normal  Right Nasal Cavity:   Mucosa: normal   Secretions: clear  Left Nasal Cavity:   Mucosa: normal   Secretions: clear  Other findings = DNS  Patient tolerated procedure well without complications

## 2022-07-07 ENCOUNTER — OFFICE VISIT (OUTPATIENT)
Dept: FAMILY MEDICINE CLINIC | Facility: CLINIC | Age: 57
End: 2022-07-07

## 2022-07-07 ENCOUNTER — OFFICE VISIT (OUTPATIENT)
Dept: OTOLARYNGOLOGY | Facility: CLINIC | Age: 57
End: 2022-07-07
Payer: COMMERCIAL

## 2022-07-07 VITALS — HEIGHT: 67 IN | WEIGHT: 195 LBS | BODY MASS INDEX: 30.61 KG/M2 | TEMPERATURE: 97.5 F

## 2022-07-07 VITALS
WEIGHT: 193.2 LBS | TEMPERATURE: 97.8 F | BODY MASS INDEX: 30.32 KG/M2 | RESPIRATION RATE: 18 BRPM | OXYGEN SATURATION: 97 % | SYSTOLIC BLOOD PRESSURE: 112 MMHG | DIASTOLIC BLOOD PRESSURE: 72 MMHG | HEART RATE: 74 BPM | HEIGHT: 67 IN

## 2022-07-07 DIAGNOSIS — J34.89 NASAL CRUSTING: ICD-10-CM

## 2022-07-07 DIAGNOSIS — E78.2 MIXED HYPERLIPIDEMIA: ICD-10-CM

## 2022-07-07 DIAGNOSIS — J34.89 NASAL VESTIBULITIS: Primary | ICD-10-CM

## 2022-07-07 DIAGNOSIS — G56.03 BILATERAL CARPAL TUNNEL SYNDROME: Primary | ICD-10-CM

## 2022-07-07 PROCEDURE — 99213 OFFICE O/P EST LOW 20 MIN: CPT | Performed by: OTOLARYNGOLOGY

## 2022-07-07 PROCEDURE — 99213 OFFICE O/P EST LOW 20 MIN: CPT | Performed by: FAMILY MEDICINE

## 2022-07-07 PROCEDURE — 87186 SC STD MICRODIL/AGAR DIL: CPT | Performed by: NURSE PRACTITIONER

## 2022-07-07 PROCEDURE — 87205 SMEAR GRAM STAIN: CPT | Performed by: NURSE PRACTITIONER

## 2022-07-07 PROCEDURE — 87070 CULTURE OTHR SPECIMN AEROBIC: CPT | Performed by: NURSE PRACTITIONER

## 2022-07-07 RX ORDER — AMPICILLIN TRIHYDRATE 250 MG
600 CAPSULE ORAL DAILY
Qty: 30 CAPSULE | Refills: 0 | Status: SHIPPED | OUTPATIENT
Start: 2022-07-07 | End: 2022-08-06

## 2022-07-07 NOTE — PATIENT INSTRUCTIONS
Start taking Red yeast rice extract and coenzyme Q10 for cholesterol   Increase exercise and improve diet   Carpal Tunnel Syndrome Exercises   AMBULATORY CARE:   What you need to know about carpal tunnel syndrome (CTS) exercises:  CTS exercises can help relieve pain and increase your range of motion  Your healthcare provider or physical therapist can tell you how often to do the exercises  CTS exercises:   Finger extensions:  Hold your fingers and thumb close together  Keep them straight  Put a rubber band around the outside of your fingers and thumb  Spread your fingers apart  Then slowly bring them together without letting the rubber band fall off  Repeat 40 times  Wrist flexor stretch:  Hold your arm out straight  Grasp your fingers with your other hand  Slowly bend the fingers back (palm facing away) until you feel a stretch in your wrist  Hold for 10 seconds  Repeat 5 times  Wrist extensor stretch:  Hold your arm out straight  Grasp your fingers with your other hand  Slowly bend the fingers and hand down (palm facing you) until you feel a stretch on top of your hand  Hold for 10 seconds  Repeat 5 times  Wrist curls without weights:  Sit in a chair with your forearm resting on your thigh or a table  With your palm facing down, flex your wrist up 3 inches and slowly lower it  Turn your forearm over and repeat with your palm facing up  Do each exercise 20 times  Shrugs:  Stand with your arms by your side  Lift your shoulders up to your ears and hold for 1 second  Then pull your shoulders back, pinching your shoulder blades together  Hold for 1 second  Then relax your shoulders  Repeat 20 times  © Copyright [employer] 2022 Information is for End User's use only and may not be sold, redistributed or otherwise used for commercial purposes   All illustrations and images included in CareNotes® are the copyrighted property of [employer]  or [employer]  The above information is an  only  It is not intended as medical advice for individual conditions or treatments  Talk to your doctor, nurse or pharmacist before following any medical regimen to see if it is safe and effective for you

## 2022-07-07 NOTE — PROGRESS NOTES
Assessment/Plan:  The patient continues to c/o a small area of crusting on the left side of his septum  There does appear to be a small area of scar tissue there, but is is not at all crusted when I examine him today or before  I wanted to try an empiric trail of Bactroban ointment, but it was not covered by his insurance  Nasal culture taken today  If it is positive, then I will direct treatment accordingly  F/u in 2 wks  No problem-specific Assessment & Plan notes found for this encounter  Diagnoses and all orders for this visit:    Nasal vestibulitis  -     Nasal culture; Future    Nasal crusting          Subjective:      Patient ID: Jayy Pennington is a 61 y o  male  The patient was not able to get the Bactroban, because it was not covered by his insurance  He continues with the same complaints; he gets recurrent, daily crusting on a small spot on his left caudal septum  It began following his UP3 surgery (which did not involve his nose)  It has not improved with the use of Vaseline, Bacitracin ointment, or Neosporin ointment  The following portions of the patient's history were reviewed and updated as appropriate: allergies, current medications, past family history, past medical history, past social history, past surgical history and problem list     Review of Systems      Objective:      Temp 97 5 °F (36 4 °C) (Temporal)   Ht 5' 7" (1 702 m)   Wt 88 5 kg (195 lb)   BMI 30 54 kg/m²          Physical Exam  Constitutional:       Appearance: He is well-developed  HENT:      Head: Normocephalic and atraumatic  Right Ear: Tympanic membrane, ear canal and external ear normal  No drainage  No middle ear effusion  Left Ear: Tympanic membrane, ear canal and external ear normal  No drainage  No middle ear effusion  Nose: Nose normal         Mouth/Throat:      Pharynx: Uvula midline  No oropharyngeal exudate  Tonsils: 0 on the right  0 on the left       Neck:      Thyroid: No thyroid mass or thyromegaly  Trachea: Trachea normal  No tracheal deviation  Lymphadenopathy:      Cervical: No cervical adenopathy  Neurological:      Mental Status: He is alert

## 2022-07-07 NOTE — PROGRESS NOTES
Assessment/Plan:     Diagnoses and all orders for this visit:    Bilateral carpal tunnel syndrome  - pt has numbness and tingling in b/l first three fingers  - describes fling sign   - negative phalen and tinel sign   - provided exercises to try   B/l hand braces at night    - PT referral   - follow up in 1 month   -     Ambulatory Referral to Physical Therapy; Future    Mixed hyperlipidemia  - Pt has AsCVD score of 8 1%  -     Red Yeast Rice 600 MG CAPS; Take 1 capsule (600 mg total) by mouth daily  -     co-enzyme Q-10 30 MG capsule; Take 1 capsule (30 mg total) by mouth daily          Subjective:      Patient ID: Gilberto Alves is a 61 y o  male  HPI  61 yr old M presents with complaint of b/l numbness in his first three fingers  States it started 2 months ago  States that it has not gotten worse  States that it is not affected by sleep position  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  His job involves a lot of lifting  Denies any numbness and tingling in his feet  The following portions of the patient's history were reviewed and updated as appropriate: allergies, current medications, past family history, past medical history, past social history, past surgical history and problem list     Review of Systems   Constitutional: Negative for chills, fatigue and fever  HENT: Negative for congestion  Eyes: Negative for discharge, itching and visual disturbance  Respiratory: Negative for cough, shortness of breath and wheezing  Cardiovascular: Negative for chest pain  Gastrointestinal: Negative for abdominal pain, constipation, diarrhea, nausea and vomiting  Endocrine: Negative for polydipsia and polyphagia  Genitourinary: Negative for decreased urine volume, difficulty urinating and dysuria  Musculoskeletal: Negative for back pain and gait problem  Neurological: Positive for numbness  Negative for dizziness, tremors, weakness, light-headedness and headaches  Psychiatric/Behavioral: Negative for agitation, behavioral problems, self-injury, sleep disturbance and suicidal ideas  The patient is not nervous/anxious  Objective:      /72   Pulse 74   Temp 97 8 °F (36 6 °C) (Tympanic)   Resp 18   Ht 5' 7" (1 702 m)   Wt 87 6 kg (193 lb 3 2 oz)   SpO2 97%   BMI 30 26 kg/m²          Physical Exam  Vitals reviewed  Constitutional:       Appearance: Normal appearance  HENT:      Head: Normocephalic and atraumatic  Cardiovascular:      Rate and Rhythm: Normal rate and regular rhythm  Pulses: Normal pulses  Heart sounds: Normal heart sounds  Pulmonary:      Effort: Pulmonary effort is normal       Breath sounds: Normal breath sounds  Neurological:      General: No focal deficit present  Mental Status: He is alert        Comments: Negative phalen and tinel sign   Psychiatric:         Mood and Affect: Mood normal          Behavior: Behavior normal

## 2022-07-10 LAB
BACTERIA WND AEROBE CULT: ABNORMAL
GRAM STN SPEC: ABNORMAL
GRAM STN SPEC: ABNORMAL

## 2022-07-21 ENCOUNTER — OFFICE VISIT (OUTPATIENT)
Dept: OTOLARYNGOLOGY | Facility: CLINIC | Age: 57
End: 2022-07-21
Payer: COMMERCIAL

## 2022-07-21 VITALS
TEMPERATURE: 98.2 F | OXYGEN SATURATION: 97 % | DIASTOLIC BLOOD PRESSURE: 76 MMHG | RESPIRATION RATE: 18 BRPM | SYSTOLIC BLOOD PRESSURE: 118 MMHG | BODY MASS INDEX: 30.29 KG/M2 | HEART RATE: 79 BPM | WEIGHT: 193 LBS | HEIGHT: 67 IN

## 2022-07-21 DIAGNOSIS — Z22.322 MRSA NASAL COLONIZATION: Primary | ICD-10-CM

## 2022-07-21 PROCEDURE — 99213 OFFICE O/P EST LOW 20 MIN: CPT | Performed by: OTOLARYNGOLOGY

## 2022-08-11 ENCOUNTER — OFFICE VISIT (OUTPATIENT)
Dept: OTOLARYNGOLOGY | Facility: CLINIC | Age: 57
End: 2022-08-11
Payer: COMMERCIAL

## 2022-08-11 VITALS — TEMPERATURE: 97.5 F | BODY MASS INDEX: 29.82 KG/M2 | HEIGHT: 67 IN | WEIGHT: 190 LBS

## 2022-08-11 DIAGNOSIS — R05.9 COUGH: ICD-10-CM

## 2022-08-11 DIAGNOSIS — J34.89 NASAL VESTIBULITIS: Primary | ICD-10-CM

## 2022-08-11 PROCEDURE — 31575 DIAGNOSTIC LARYNGOSCOPY: CPT | Performed by: OTOLARYNGOLOGY

## 2022-08-11 PROCEDURE — 99213 OFFICE O/P EST LOW 20 MIN: CPT | Performed by: OTOLARYNGOLOGY

## 2022-08-11 NOTE — PROGRESS NOTES
Assessment/Plan:  Improving nasal vestibulitis  Continue Bactroban ointment another 2 wks  Normal endoscopy, no sign of any sinus drainage  F/u PRN  No problem-specific Assessment & Plan notes found for this encounter  Diagnoses and all orders for this visit:    Nasal vestibulitis    Cough          Subjective:      Patient ID: Maicol Mondragon is a 61 y o  male  Pt was able to start the Bactroban ointment  His nose is feeling better  He describes occasional episodes when he feels drainage in the left side of his throat, causing him to cough  The following portions of the patient's history were reviewed and updated as appropriate: allergies, current medications, past family history, past medical history, past social history, past surgical history and problem list     Review of Systems      Objective:      Temp 97 5 °F (36 4 °C) (Temporal)   Ht 5' 7" (1 702 m)   Wt 86 2 kg (190 lb)   BMI 29 76 kg/m²          Physical Exam  Constitutional:       Appearance: He is well-developed  HENT:      Head: Normocephalic and atraumatic  Right Ear: Tympanic membrane, ear canal and external ear normal  No drainage  No middle ear effusion  Left Ear: Tympanic membrane, ear canal and external ear normal  No drainage  No middle ear effusion  Nose: Nose normal         Mouth/Throat:      Pharynx: Uvula midline  No oropharyngeal exudate  Tonsils: 0 on the right  0 on the left  Neck:      Thyroid: No thyroid mass or thyromegaly  Trachea: Trachea normal  No tracheal deviation  Lymphadenopathy:      Cervical: No cervical adenopathy  Neurological:      Mental Status: He is alert  Flexible Fiberoptic Nasolaryngoscopy Procedure Note:  Indication:  cough  Verbal consent obtained  Surgeon: Mine Barrientos MD  Anesthesia: 4% lidocaine, oxymetazoline  Scope passed through nasal cavities bilaterally    Right Nasal Cavity:  Mucosa: normal  Secretions: clear  Left Nasal Cavity:  Mucosa: normal  Secretions: clear  Nasopharynx: unremarkable  Oropharynx: unremarkable  Hypopharynx/Larynx:   Vocal fold mobility = nl   Laryngeal edema  = mild PCE   Laryngeal erythema = none   Vocal folds = nl   Valleculae= clear   Piriform Sinuses= clear  Other findings = none  Patient tolerated procedure well without complications

## 2023-05-23 ENCOUNTER — HOSPITAL ENCOUNTER (EMERGENCY)
Facility: HOSPITAL | Age: 58
Discharge: HOME | End: 2023-05-23
Attending: EMERGENCY MEDICINE
Payer: COMMERCIAL

## 2023-05-23 VITALS
HEART RATE: 81 BPM | RESPIRATION RATE: 20 BRPM | SYSTOLIC BLOOD PRESSURE: 160 MMHG | DIASTOLIC BLOOD PRESSURE: 92 MMHG | TEMPERATURE: 96.8 F | WEIGHT: 201 LBS | BODY MASS INDEX: 31.48 KG/M2 | OXYGEN SATURATION: 96 %

## 2023-05-23 DIAGNOSIS — R05.9 COUGH: Primary | ICD-10-CM

## 2023-05-23 DIAGNOSIS — J31.0 CHRONIC RHINITIS: ICD-10-CM

## 2023-05-23 DIAGNOSIS — J40 BRONCHITIS: ICD-10-CM

## 2023-05-23 PROCEDURE — 99283 EMERGENCY DEPT VISIT LOW MDM: CPT

## 2023-05-23 RX ORDER — AZITHROMYCIN 250 MG/1
TABLET, FILM COATED ORAL
Qty: 6 TABLET | Refills: 0 | Status: SHIPPED | OUTPATIENT
Start: 2023-05-23 | End: 2023-05-27

## 2023-05-23 RX ORDER — ALBUTEROL SULFATE 90 UG/1
2 AEROSOL, METERED RESPIRATORY (INHALATION) EVERY 6 HOURS PRN
Qty: 8.5 G | Refills: 0 | Status: SHIPPED | OUTPATIENT
Start: 2023-05-23

## 2023-05-23 RX ORDER — FEXOFENADINE HCL AND PSEUDOEPHEDRINE HCI 60; 120 MG/1; MG/1
1 TABLET, EXTENDED RELEASE ORAL EVERY 12 HOURS
Qty: 30 TABLET | Refills: 0 | Status: SHIPPED | OUTPATIENT
Start: 2023-05-23

## 2023-05-23 RX ORDER — FLUTICASONE PROPIONATE 50 MCG
2 SPRAY, SUSPENSION (ML) NASAL DAILY
Qty: 18.2 ML | Refills: 0 | Status: SHIPPED | OUTPATIENT
Start: 2023-05-23

## 2023-05-23 NOTE — ED PROVIDER NOTES
History  Chief Complaint   Patient presents with   • Cough     Pt states post nasal drip and cough for a few days  Has a narrow throat and states it feels very irritated  Denies food bolus  No respiratory distress  61 yo male c/o sudden onset phlegm down back of throat and feeling it down the right side of his throat and in his chest which caused him to cough excessively to try to get it up and then he felt short of breath  Occurred just prior to arrival   He has history of this phlegm feeling, it is yellow and thick for months  He is s/p uvulectomy for narrowed throat in the past   No fever, vomiting, diarrhea  He wants something to dry up the phlegm and needs refill on his Flonase  History provided by:  Patient   used: No    Cough  Associated symptoms: shortness of breath    Associated symptoms: no fever        Prior to Admission Medications   Prescriptions Last Dose Informant Patient Reported?  Taking?   famotidine (PEPCID) 40 MG tablet   No No   Sig: Take 1 tablet (40 mg total) by mouth daily at bedtime   fluticasone (FLONASE) 50 mcg/act nasal spray   No No   Si sprays into each nostril daily   fluticasone (FLONASE) 50 mcg/act nasal spray   No Yes   Si sprays into each nostril daily   ibuprofen (MOTRIN) 600 mg tablet   No No   Sig: Take 1 tablet (600 mg total) by mouth every 6 (six) hours as needed for mild pain   mupirocin (BACTROBAN) 2 % nasal ointment   No No   Sig: into each nostril 2 (two) times a day   omeprazole (PriLOSEC) 40 MG capsule   No No   Sig: Take 1 capsule (40 mg total) by mouth daily   sodium chloride (OCEAN) 0 65 % nasal spray   No No   Si spray into each nostril as needed for congestion or rhinitis      Facility-Administered Medications: None       Past Medical History:   Diagnosis Date   • Abnormal weight gain     Last assessed 3/24/2014   • Allergic    • Anxiety    • Gastroesophageal reflux disease without esophagitis 3/2/2020   • GERD (gastroesophageal reflux disease)    • Hemorrhoids    • History of gastroesophageal reflux (GERD)    • Hyperlipidemia     Last assessed 3/24/2014    • Sleep apnea     insurance not covering       Past Surgical History:   Procedure Laterality Date   • COLONOSCOPY     • IR BIOPSY LIVER RANDOM  5/17/2021   • WA PALATOPHARYNGOPLASTY N/A 3/13/2020    Procedure: UVULOPALATOPHARYNGOPLASTY (UPPP); Surgeon: Cheryl Preston MD;  Location: [facility];  Service: ENT   • TONSILLECTOMY  03/16/2020   • UVULECTOMY  03/16/2020       Family History   Problem Relation Age of Onset   • Hypertension Mother    • No Known Problems Father    • COPD Maternal Uncle    • No Known Problems Sister    • No Known Problems Brother      I have reviewed and agree with the history as documented  E-Cigarette/Vaping   • E-Cigarette Use Never User      E-Cigarette/Vaping Substances   • Nicotine No    • THC No    • CBD No    • Flavoring No    • Other No    • Unknown No      Social History     Tobacco Use   • Smoking status: Never   • Smokeless tobacco: Never   Vaping Use   • Vaping Use: Never used   Substance Use Topics   • Alcohol use: No   • Drug use: No       Review of Systems   Constitutional: Negative for fever  HENT: Positive for congestion  Respiratory: Positive for cough and shortness of breath  Gastrointestinal: Negative for diarrhea and vomiting  Physical Exam  Physical Exam  Vitals and nursing note reviewed  Constitutional:       General: He is not in acute distress  Appearance: He is not ill-appearing  Comments: Speech normal, no drooling, no resp  Distress, pulse ox normal    HENT:      Head: Normocephalic and atraumatic  Right Ear: Tympanic membrane and ear canal normal       Left Ear: Tympanic membrane and ear canal normal       Nose: Nose normal       Mouth/Throat:      Mouth: Mucous membranes are moist       Pharynx: Posterior oropharyngeal erythema present  No oropharyngeal exudate     Eyes:      General: No scleral icterus  Conjunctiva/sclera: Conjunctivae normal    Cardiovascular:      Rate and Rhythm: Normal rate and regular rhythm  Heart sounds: Normal heart sounds  Pulmonary:      Effort: Pulmonary effort is normal  No respiratory distress  Breath sounds: Normal breath sounds  Musculoskeletal:         General: No deformity  Normal range of motion  Cervical back: Normal range of motion and neck supple  Lymphadenopathy:      Cervical: No cervical adenopathy  Skin:     General: Skin is warm and dry  Neurological:      General: No focal deficit present  Mental Status: He is alert and oriented to person, place, and time  Gait: Gait normal    Psychiatric:         Mood and Affect: Mood normal          Behavior: Behavior normal          Vital Signs  ED Triage Vitals [05/23/23 1609]   Temperature Pulse Respirations Blood Pressure SpO2   (!) 96 8 °F (36 °C) 81 20 160/92 96 %      Temp Source Heart Rate Source Patient Position - Orthostatic VS BP Location FiO2 (%)   Temporal Monitor Sitting Right arm --      Pain Score       No Pain           Vitals:    05/23/23 1609   BP: 160/92   Pulse: 81   Patient Position - Orthostatic VS: Sitting         Visual Acuity      ED Medications  Medications - No data to display    Diagnostic Studies  Results Reviewed     None                 No orders to display              Procedures  Procedures         ED Course                                             Medical Decision Making  Will treat for bronchitis and refill his Flonase  Advised follow up outpt  ENT  Risk  OTC drugs  Prescription drug management            Disposition  Final diagnoses:   Cough   Bronchitis   Chronic rhinitis     Time reflects when diagnosis was documented in both MDM as applicable and the Disposition within this note     Time User Action Codes Description Comment    2/67/5239  7:00 PM Mike Pea Add [H30 4] Cough     3/31/0048  7:91 PM Shana Lakhani Add [G10] Bronchitis     9/91/0812  3:79 PM Miller BOYCE Add [V63 3] Chronic rhinitis       ED Disposition     ED Disposition   Discharge    Condition   Stable    Date/Time   Tue May 23, 2023  4:32 PM    Comment   Corey Pretty discharge to home/self care  Follow-up Information     Follow up With Specialties Details Why Contact Info    your ENT specialist              Discharge Medication List as of 5/23/2023  4:36 PM      START taking these medications    Details   albuterol (ProAir HFA) 90 mcg/act inhaler Inhale 2 puffs every 6 (six) hours as needed (cough, trouble breathing), Starting Tue 5/23/2023, Normal      azithromycin (ZITHROMAX) 250 mg tablet Take 2 tablets today then 1 tablet daily x 4 days, Normal      fexofenadine-pseudoephedrine (ALLEGRA-D)  MG per tablet Take 1 tablet by mouth every 12 (twelve) hours, Starting Tue 5/23/2023, Normal         CONTINUE these medications which have CHANGED    Details   fluticasone (FLONASE) 50 mcg/act nasal spray 2 sprays into each nostril daily, Starting Tue 5/23/2023, Normal         CONTINUE these medications which have NOT CHANGED    Details   famotidine (PEPCID) 40 MG tablet Take 1 tablet (40 mg total) by mouth daily at bedtime, Starting Thu 4/21/2022, Normal      ibuprofen (MOTRIN) 600 mg tablet Take 1 tablet (600 mg total) by mouth every 6 (six) hours as needed for mild pain, Starting Wed 11/25/2020, Normal      mupirocin (BACTROBAN) 2 % nasal ointment into each nostril 2 (two) times a day, Starting Thu 7/21/2022, Normal      omeprazole (PriLOSEC) 40 MG capsule Take 1 capsule (40 mg total) by mouth daily, Starting Wed 2/5/2020, Normal      sodium chloride (OCEAN) 0 65 % nasal spray 1 spray into each nostril as needed for congestion or rhinitis, Starting Tue 3/9/2021, Normal             No discharge procedures on file      PDMP Review       Value Time User    PDMP Reviewed  Yes 3/13/2020  3:32 PM Eric Melgoza MD          ED Provider  Electronically Signed by           Grace Moses MD  92/27/97 [id number]

## 2023-10-24 ENCOUNTER — TELEPHONE (OUTPATIENT)
Age: 58
End: 2023-10-24

## 2023-10-24 ENCOUNTER — OFFICE VISIT (OUTPATIENT)
Dept: INTERNAL MEDICINE CLINIC | Facility: CLINIC | Age: 58
End: 2023-10-24
Payer: COMMERCIAL

## 2023-10-24 VITALS
WEIGHT: 197 LBS | HEART RATE: 90 BPM | DIASTOLIC BLOOD PRESSURE: 80 MMHG | SYSTOLIC BLOOD PRESSURE: 132 MMHG | HEIGHT: 67 IN | OXYGEN SATURATION: 99 % | BODY MASS INDEX: 30.92 KG/M2 | TEMPERATURE: 98 F

## 2023-10-24 DIAGNOSIS — E78.2 MIXED HYPERLIPIDEMIA: ICD-10-CM

## 2023-10-24 DIAGNOSIS — Z12.11 SCREENING FOR COLON CANCER: ICD-10-CM

## 2023-10-24 DIAGNOSIS — K21.9 GASTROESOPHAGEAL REFLUX DISEASE WITHOUT ESOPHAGITIS: ICD-10-CM

## 2023-10-24 DIAGNOSIS — G47.33 OSA (OBSTRUCTIVE SLEEP APNEA): Primary | ICD-10-CM

## 2023-10-24 DIAGNOSIS — H10.11 ALLERGIC CONJUNCTIVITIS OF RIGHT EYE: ICD-10-CM

## 2023-10-24 DIAGNOSIS — A59.9 TRICHOMONAS INFECTION: ICD-10-CM

## 2023-10-24 DIAGNOSIS — J31.0 CHRONIC RHINITIS: ICD-10-CM

## 2023-10-24 DIAGNOSIS — B37.9 CANDIDA ALBICANS INFECTION: ICD-10-CM

## 2023-10-24 DIAGNOSIS — D12.6 ADENOMATOUS POLYP OF COLON, UNSPECIFIED PART OF COLON: ICD-10-CM

## 2023-10-24 PROCEDURE — 99203 OFFICE O/P NEW LOW 30 MIN: CPT | Performed by: INTERNAL MEDICINE

## 2023-10-24 RX ORDER — KETOCONAZOLE 20 MG/G
CREAM TOPICAL DAILY
Qty: 30 G | Refills: 0 | Status: SHIPPED | OUTPATIENT
Start: 2023-10-24

## 2023-10-24 RX ORDER — FEXOFENADINE HCL AND PSEUDOEPHEDRINE HCI 60; 120 MG/1; MG/1
1 TABLET, EXTENDED RELEASE ORAL EVERY 12 HOURS
Qty: 30 TABLET | Refills: 3 | Status: SHIPPED | OUTPATIENT
Start: 2023-10-24

## 2023-10-24 RX ORDER — OLOPATADINE HYDROCHLORIDE 1 MG/ML
1 SOLUTION/ DROPS OPHTHALMIC 2 TIMES DAILY
Qty: 5 ML | Refills: 0 | Status: SHIPPED | OUTPATIENT
Start: 2023-10-24

## 2023-10-24 RX ORDER — METRONIDAZOLE 250 MG/1
250 TABLET ORAL EVERY 8 HOURS SCHEDULED
Qty: 21 TABLET | Refills: 0 | Status: SHIPPED | OUTPATIENT
Start: 2023-10-24 | End: 2023-10-31

## 2023-10-24 RX ORDER — FAMOTIDINE 40 MG/1
40 TABLET, FILM COATED ORAL
Qty: 30 TABLET | Refills: 5 | Status: SHIPPED | OUTPATIENT
Start: 2023-10-24

## 2023-10-24 NOTE — ASSESSMENT & PLAN NOTE
Complains of irritation pain redness of the tip of the penis suspected Candida infection advised Nizoral cream twice a day

## 2023-10-24 NOTE — PATIENT INSTRUCTIONS
Allergic Rhinitis   WHAT YOU NEED TO KNOW:   Allergic rhinitis, or hay fever, is swelling of the inside of your nose. The swelling is a reaction to allergens in the air. An allergen can be anything that causes an allergic reaction. Allergies to weeds, grass, trees, or mold often cause seasonal allergic rhinitis. Indoor dust mites, cockroaches, pet dander, or mold can also cause allergic rhinitis. DISCHARGE INSTRUCTIONS:   Call 911 for the following: You have chest pain or shortness of breath. Return to the emergency department if:   You have severe pain. You cough up blood. Contact your healthcare provider if:   You have a fever. You have ear or sinus pain, or a headache. Your symptoms get worse, even after treatment. You have yellow, green, brown, or bloody mucus coming from your nose. Your nose is bleeding or you have pain inside your nose. You have trouble sleeping because of your symptoms. You have questions or concerns about your condition or care. Medicines:   Medicines  help decrease your symptoms and clear your stuffy nose. Take your medicine as directed. Contact your healthcare provider if you think your medicine is not helping or if you have side effects. Tell him of her if you are allergic to any medicine. Keep a list of the medicines, vitamins, and herbs you take. Include the amounts, and when and why you take them. Bring the list or the pill bottles to follow-up visits. Carry your medicine list with you in case of an emergency. How to manage allergic rhinitis:  The best way to manage allergic rhinitis is to avoid allergens that can trigger your symptoms. Any of the following may help decrease your symptoms:  Rinse your nose and sinuses  with a salt water solution or use a salt water nasal spray. This will help thin the mucus in your nose and rinse away pollen and dirt. It will also help reduce swelling so you can breathe normally.  Ask your healthcare provider how often to rinse your nose. Reduce exposure to dust mites. Wash sheets and towels in hot water every week. Cover your pillows and mattresses with allergen-free covers. Limit the number of stuffed animals and soft toys your child has. Wash your child's toys in hot water regularly. Vacuum weekly and use a vacuum  with an air filter. If possible, get rid of carpets and curtains. These collect dust and dust mites. Reduce exposure to pollen. Keep windows and doors closed in your house and car. Stay inside when air pollution or the pollen count is high. Run your air conditioner on recycle, and change air filters often. Shower and wash your hair before bed every night to rinse away pollen. Reduce exposure to pet dander. If possible, do not keep cats, dogs, birds, or other pets. If you do keep pets in your home, keep them out of bedrooms and carpeted rooms. Bathe them often. Reduce exposure to mold. Do not spend time in basements. Choose artificial plants instead of live plants. Keep your home's humidity at less than 45%. Do not have ponds or standing water in your home or yard. Do not smoke. Avoid others who smoke. Ask your healthcare provider for information if you currently smoke and need help to quit. Follow up with your healthcare provider as directed: You may need to see an allergist often to control your symptoms. Write down your questions so you remember to ask them during your visits. © Copyright [employer] 2022 Information is for End User's use only and may not be sold, redistributed or otherwise used for commercial purposes. All illustrations and images included in CareNotes® are the copyrighted property of [employer] or  [address]  The above information is an  only. It is not intended as medical advice for individual conditions or treatments.  Talk to your doctor, nurse or pharmacist before following any medical regimen to see if it is safe and effective for you.

## 2023-10-24 NOTE — ASSESSMENT & PLAN NOTE
Complains of a burning during urination suspected contact with trichomonas we will treat with Flagyl if no response further work-up treatment Otezla Counseling: The side effects of Otezla were discussed with the patient, including but not limited to worsening or new depression, weight loss, diarrhea, nausea, upper respiratory tract infection, and headache. Patient instructed to call the office should any adverse effect occur.  The patient verbalized understanding of the proper use and possible adverse effects of Otezla.  All the patient's questions and concerns were addressed.

## 2023-10-24 NOTE — ASSESSMENT & PLAN NOTE
Post uvula removal March 3034 uncomplicated postop course since then patient feeling better sleep apnea symptoms resolved

## 2023-10-24 NOTE — PROGRESS NOTES
Dr. Lulu Brady Office Visit Note  10/24/23     Jorge Gongora 60 y.o. male MRN: [id number]  : 1965    Assessment:     1. MARIO (obstructive sleep apnea)    2. Chronic rhinitis  Assessment & Plan:  Followed by ENT continue Allegra-D and Flonase as recommended by ENT    Orders:  -     fexofenadine-pseudoephedrine (ALLEGRA-D)  MG per tablet; Take 1 tablet by mouth every 12 (twelve) hours    3. Gastroesophageal reflux disease without esophagitis  Assessment & Plan:  Continue Pepcid symptoms controlled stricter about the diet avoid NSAID    Orders:  -     famotidine (PEPCID) 40 MG tablet; Take 1 tablet (40 mg total) by mouth daily at bedtime    4. Trichomonas infection  Assessment & Plan:  Complains of a burning during urination suspected contact with trichomonas we will treat with Flagyl if no response further work-up treatment    Orders:  -     metroNIDAZOLE (FLAGYL) 250 mg tablet; Take 1 tablet (250 mg total) by mouth every 8 (eight) hours for 7 days    5. Candida albicans infection  Assessment & Plan:  Complains of irritation pain redness of the tip of the penis suspected Candida infection advised Nizoral cream twice a day    Orders:  -     ketoconazole (NIZORAL) 2 % cream; Apply topically daily Apply twice a day affected area    6. Adenomatous polyp of colon, unspecified part of colon  -     Ambulatory Referral to Gastroenterology; Future    7. Allergic conjunctivitis of right eye  -     olopatadine (PATANOL) 0.1 % ophthalmic solution; Administer 1 drop to the right eye 2 (two) times a day    8. Mixed hyperlipidemia    9. Screening for colon cancer  -     Ambulatory Referral to Gastroenterology; Future          Discussion Summary and Plan: Today's care plan and medications were reviewed with patient in detail and all their questions answered to their satisfaction.     Chief Complaint   Patient presents with   • New Patient Visit      Subjective:  Patient came in follow-up chronic medical condition listed under visit diagnosis complains of redness itching right eye and some burning on urination and suspected history come in contact with trichomonas and a refill for Allegra for chronic rhinitis due to the allergic rhinitis on Allegra and Flonase patient came in first time seen as a new patient        The following portions of the patient's history were reviewed and updated as appropriate: allergies, current medications, past family history, past medical history, past social history, past surgical history and problem list.    Review of Systems   Constitutional:  Negative for activity change, appetite change, chills, diaphoresis, fatigue, fever and unexpected weight change. HENT:  Positive for congestion and rhinorrhea. Negative for dental problem, drooling, ear discharge, ear pain, facial swelling, hearing loss, mouth sores, nosebleeds, postnasal drip, sinus pressure, sneezing, sore throat, tinnitus, trouble swallowing and voice change. Eyes:  Negative for photophobia, pain, discharge, redness, itching and visual disturbance. Respiratory:  Negative for apnea, cough, choking, chest tightness, shortness of breath, wheezing and stridor. Cardiovascular:  Negative for chest pain, palpitations and leg swelling. Gastrointestinal:  Negative for abdominal distention, abdominal pain, anal bleeding, blood in stool, constipation, diarrhea, nausea, rectal pain and vomiting. Endocrine: Negative for cold intolerance, heat intolerance, polydipsia, polyphagia and polyuria. Genitourinary:  Negative for decreased urine volume, difficulty urinating, dysuria, enuresis, flank pain, frequency, genital sores, hematuria and urgency. Musculoskeletal:  Positive for arthralgias. Negative for back pain, gait problem, joint swelling, myalgias, neck pain and neck stiffness. Skin:  Negative for color change, pallor, rash and wound. Allergic/Immunologic: Negative.   Negative for environmental allergies, food allergies and immunocompromised state. Neurological:  Negative for dizziness, tremors, seizures, syncope, facial asymmetry, speech difficulty, weakness, light-headedness, numbness and headaches. Psychiatric/Behavioral:  Negative for agitation, behavioral problems, confusion, decreased concentration, dysphoric mood, hallucinations, self-injury, sleep disturbance and suicidal ideas. The patient is not nervous/anxious and is not hyperactive. Historical Information   Patient Active Problem List   Diagnosis   • Daytime hypersomnolence   • MARIO (obstructive sleep apnea)   • Obstructive sleep apnea   • History of gastroesophageal reflux (GERD)   • Hyperlipidemia   • Chronic rhinitis   • Gastroesophageal reflux disease without esophagitis   • Steatohepatitis   • Obesity due to excess calories   • Tubular adenoma of colon   • NAFLD (nonalcoholic fatty liver disease)   • Trichomonas infection   • Candida albicans infection     Past Medical History:   Diagnosis Date   • Abnormal weight gain     Last assessed 3/24/2014   • Allergic    • Anxiety    • Gastroesophageal reflux disease without esophagitis 3/2/2020   • GERD (gastroesophageal reflux disease)    • Hemorrhoids    • History of gastroesophageal reflux (GERD)    • Hyperlipidemia     Last assessed 3/24/2014    • Sleep apnea     insurance not covering     Past Surgical History:   Procedure Laterality Date   • COLONOSCOPY     • IR BIOPSY LIVER RANDOM  5/17/2021   • LA PALATOPHARYNGOPLASTY N/A 3/13/2020    Procedure: UVULOPALATOPHARYNGOPLASTY (UPPP);   Surgeon: Efra Alvarez MD;  Location: [facility];  Service: ENT   • TONSILLECTOMY  03/16/2020   • UVULECTOMY  03/16/2020     Social History     Substance and Sexual Activity   Alcohol Use No     Social History     Substance and Sexual Activity   Drug Use No     Social History     Tobacco Use   Smoking Status Never   Smokeless Tobacco Never     Family History   Problem Relation Age of Onset   • Hypertension Mother    • No Known Problems Father    • COPD Maternal Uncle    • No Known Problems Sister    • No Known Problems Brother      Health Maintenance Due   Topic   • DTaP,Tdap,and Td Vaccines (1 - Tdap)   • COVID-19 Vaccine (4 - Pfizer series)   • BMI: Followup Plan    • Annual Physical    • Influenza Vaccine (1)      Meds/Allergies       Current Outpatient Medications:   •  albuterol (ProAir HFA) 90 mcg/act inhaler, Inhale 2 puffs every 6 (six) hours as needed (cough, trouble breathing), Disp: 8.5 g, Rfl: 0  •  famotidine (PEPCID) 40 MG tablet, Take 1 tablet (40 mg total) by mouth daily at bedtime, Disp: 30 tablet, Rfl: 5  •  fexofenadine-pseudoephedrine (ALLEGRA-D)  MG per tablet, Take 1 tablet by mouth every 12 (twelve) hours, Disp: 30 tablet, Rfl: 3  •  fluticasone (FLONASE) 50 mcg/act nasal spray, 2 sprays into each nostril daily, Disp: 18.2 mL, Rfl: 0  •  ibuprofen (MOTRIN) 600 mg tablet, Take 1 tablet (600 mg total) by mouth every 6 (six) hours as needed for mild pain, Disp: 30 tablet, Rfl: 0  •  ketoconazole (NIZORAL) 2 % cream, Apply topically daily Apply twice a day affected area, Disp: 30 g, Rfl: 0  •  metroNIDAZOLE (FLAGYL) 250 mg tablet, Take 1 tablet (250 mg total) by mouth every 8 (eight) hours for 7 days, Disp: 21 tablet, Rfl: 0  •  olopatadine (PATANOL) 0.1 % ophthalmic solution, Administer 1 drop to the right eye 2 (two) times a day, Disp: 5 mL, Rfl: 0      Objective:    Vitals:   /80   Pulse 90   Temp 98 °F (36.7 °C)   Ht 5' 7" (1.702 m)   Wt 89.4 kg (197 lb)   SpO2 99%   BMI 30.85 kg/m²   Body mass index is 30.85 kg/m². Vitals:    10/24/23 1436   Weight: 89.4 kg (197 lb)       Physical Exam  Constitutional:       General: He is not in acute distress. Appearance: He is well-developed. He is not ill-appearing, toxic-appearing or diaphoretic. HENT:      Head: Normocephalic and atraumatic.       Right Ear: External ear normal.      Left Ear: External ear normal.      Nose: Congestion and rhinorrhea present. Mouth/Throat:      Pharynx: No oropharyngeal exudate. Eyes:      General: Lids are normal. Lids are everted, no foreign bodies appreciated. No scleral icterus. Right eye: No discharge. Left eye: No discharge. Conjunctiva/sclera: Conjunctivae normal.      Pupils: Pupils are equal, round, and reactive to light. Neck:      Thyroid: No thyromegaly. Vascular: Normal carotid pulses. No carotid bruit, hepatojugular reflux or JVD. Trachea: No tracheal tenderness or tracheal deviation. Cardiovascular:      Rate and Rhythm: Normal rate and regular rhythm. Pulses: Normal pulses. Heart sounds: Normal heart sounds. No murmur heard. No friction rub. No gallop. Pulmonary:      Effort: Pulmonary effort is normal. No respiratory distress. Breath sounds: Normal breath sounds. No stridor. No wheezing or rales. Chest:      Chest wall: No tenderness. Abdominal:      General: Bowel sounds are normal. There is no distension. Palpations: Abdomen is soft. There is no mass. Tenderness: There is no abdominal tenderness. There is no guarding or rebound. Musculoskeletal:         General: No tenderness or deformity. Normal range of motion. Cervical back: Normal range of motion and neck supple. No edema, erythema or rigidity. No spinous process tenderness or muscular tenderness. Normal range of motion. Lymphadenopathy:      Head:      Right side of head: No submental, submandibular, tonsillar, preauricular or posterior auricular adenopathy. Left side of head: No submental, submandibular, tonsillar, preauricular, posterior auricular or occipital adenopathy. Cervical: No cervical adenopathy. Right cervical: No superficial, deep or posterior cervical adenopathy. Left cervical: No superficial, deep or posterior cervical adenopathy. Upper Body:      Right upper body: No pectoral adenopathy. Left upper body: No pectoral adenopathy. Skin:     General: Skin is warm and dry. Coloration: Skin is not pale. Findings: No erythema or rash. Neurological:      Mental Status: He is alert and oriented to person, place, and time. Cranial Nerves: No cranial nerve deficit. Sensory: No sensory deficit. Motor: No tremor, abnormal muscle tone or seizure activity. Coordination: Coordination normal.      Gait: Gait normal.      Deep Tendon Reflexes: Reflexes are normal and symmetric. Reflexes normal.   Psychiatric:         Behavior: Behavior normal.         Thought Content: Thought content normal.         Judgment: Judgment normal.         Lab Review   No visits with results within 2 Month(s) from this visit. Latest known visit with results is:   Office Visit on 07/07/2022   Component Date Value Ref Range Status   • Wound Culture 07/07/2022 4+ Growth of Staphylococcus aureus (A)   Final   • Gram Stain Result 07/07/2022 Rare Gram positive cocci in pairs (A)   Final   • Gram Stain Result 07/07/2022 Rare Polys (A)   Final         Patient Instructions   Allergic Rhinitis   WHAT YOU NEED TO KNOW:   Allergic rhinitis, or hay fever, is swelling of the inside of your nose. The swelling is a reaction to allergens in the air. An allergen can be anything that causes an allergic reaction. Allergies to weeds, grass, trees, or mold often cause seasonal allergic rhinitis. Indoor dust mites, cockroaches, pet dander, or mold can also cause allergic rhinitis. DISCHARGE INSTRUCTIONS:   Call 911 for the following: You have chest pain or shortness of breath. Return to the emergency department if:   You have severe pain. You cough up blood. Contact your healthcare provider if:   You have a fever. You have ear or sinus pain, or a headache. Your symptoms get worse, even after treatment. You have yellow, green, brown, or bloody mucus coming from your nose. Your nose is bleeding or you have pain inside your nose.      You have trouble sleeping because of your symptoms. You have questions or concerns about your condition or care. Medicines:   Medicines  help decrease your symptoms and clear your stuffy nose. Take your medicine as directed. Contact your healthcare provider if you think your medicine is not helping or if you have side effects. Tell him of her if you are allergic to any medicine. Keep a list of the medicines, vitamins, and herbs you take. Include the amounts, and when and why you take them. Bring the list or the pill bottles to follow-up visits. Carry your medicine list with you in case of an emergency. How to manage allergic rhinitis:  The best way to manage allergic rhinitis is to avoid allergens that can trigger your symptoms. Any of the following may help decrease your symptoms:  Rinse your nose and sinuses  with a salt water solution or use a salt water nasal spray. This will help thin the mucus in your nose and rinse away pollen and dirt. It will also help reduce swelling so you can breathe normally. Ask your healthcare provider how often to rinse your nose. Reduce exposure to dust mites. Wash sheets and towels in hot water every week. Cover your pillows and mattresses with allergen-free covers. Limit the number of stuffed animals and soft toys your child has. Wash your child's toys in hot water regularly. Vacuum weekly and use a vacuum  with an air filter. If possible, get rid of carpets and curtains. These collect dust and dust mites. Reduce exposure to pollen. Keep windows and doors closed in your house and car. Stay inside when air pollution or the pollen count is high. Run your air conditioner on recycle, and change air filters often. Shower and wash your hair before bed every night to rinse away pollen. Reduce exposure to pet dander. If possible, do not keep cats, dogs, birds, or other pets. If you do keep pets in your home, keep them out of bedrooms and carpeted rooms.  Bathe them often. Reduce exposure to mold. Do not spend time in basements. Choose artificial plants instead of live plants. Keep your home's humidity at less than 45%. Do not have ponds or standing water in your home or yard. Do not smoke. Avoid others who smoke. Ask your healthcare provider for information if you currently smoke and need help to quit. Follow up with your healthcare provider as directed: You may need to see an allergist often to control your symptoms. Write down your questions so you remember to ask them during your visits. © Copyright [employer] 2022 Information is for End User's use only and may not be sold, redistributed or otherwise used for commercial purposes. All illustrations and images included in CareNotes® are the copyrighted property of [employer] or [address] [place] [address]  The above information is an  only. It is not intended as medical advice for individual conditions or treatments. Talk to your doctor, nurse or pharmacist before following any medical regimen to see if it is safe and effective for you. Barbara Espinoza MD        "This note has been constructed using a voice recognition system. Therefore there may be syntax, spelling, and/or grammatical errors.  Please call if you have any questions. "

## 2023-10-25 NOTE — TELEPHONE ENCOUNTER
Nikko from [address] [place] [address] called and asked for instructions on keto cream.  I read per the script apply to affected area twice daily.

## 2023-10-27 ENCOUNTER — TELEPHONE (OUTPATIENT)
Age: 58
End: 2023-10-27

## 2023-10-27 DIAGNOSIS — N39.0 URINARY TRACT INFECTION WITH HEMATURIA, SITE UNSPECIFIED: Primary | ICD-10-CM

## 2023-10-27 DIAGNOSIS — R31.9 URINARY TRACT INFECTION WITH HEMATURIA, SITE UNSPECIFIED: Primary | ICD-10-CM

## 2023-10-27 RX ORDER — CIPROFLOXACIN 500 MG/1
500 TABLET, FILM COATED ORAL EVERY 12 HOURS SCHEDULED
COMMUNITY
End: 2023-10-27 | Stop reason: SDUPTHER

## 2023-10-27 RX ORDER — CIPROFLOXACIN 500 MG/1
500 TABLET, FILM COATED ORAL EVERY 12 HOURS SCHEDULED
Qty: 20 TABLET | Refills: 0 | Status: SHIPPED | OUTPATIENT
Start: 2023-10-27 | End: 2023-11-06

## 2023-10-27 NOTE — TELEPHONE ENCOUNTER
Patient called in post OV 10/24 and reports taking metroNIDAZOLE (FLAGYL) 250 mg tablet for infection with burning pain with urination. Patient called in today to report he is still experiencing burning pain with no improvement. Please follow up with patient.

## 2023-10-31 ENCOUNTER — APPOINTMENT (OUTPATIENT)
Dept: LAB | Facility: HOSPITAL | Age: 58
End: 2023-10-31
Payer: COMMERCIAL

## 2023-10-31 DIAGNOSIS — G47.33 OBSTRUCTIVE SLEEP APNEA (ADULT) (PEDIATRIC): ICD-10-CM

## 2023-10-31 DIAGNOSIS — K76.0 FATTY METAMORPHOSIS OF LIVER: ICD-10-CM

## 2023-10-31 DIAGNOSIS — K21.9 CHALASIA OF LOWER ESOPHAGEAL SPHINCTER: ICD-10-CM

## 2023-10-31 DIAGNOSIS — E78.2 MIXED HYPERLIPIDEMIA: ICD-10-CM

## 2023-10-31 DIAGNOSIS — Z12.5 SPECIAL SCREENING FOR MALIGNANT NEOPLASM OF PROSTATE: ICD-10-CM

## 2023-10-31 DIAGNOSIS — R73.01 IMPAIRED FASTING GLUCOSE: ICD-10-CM

## 2023-10-31 DIAGNOSIS — Z11.3 SCREENING EXAMINATION FOR VENEREAL DISEASE: ICD-10-CM

## 2023-10-31 DIAGNOSIS — Z76.89 MENSTRUAL EXTRACTION: ICD-10-CM

## 2023-10-31 LAB
ALBUMIN SERPL BCP-MCNC: 4.7 G/DL (ref 3.5–5)
ALP SERPL-CCNC: 82 U/L (ref 34–104)
ALT SERPL W P-5'-P-CCNC: 61 U/L (ref 7–52)
ANION GAP SERPL CALCULATED.3IONS-SCNC: 8 MMOL/L
AST SERPL W P-5'-P-CCNC: 28 U/L (ref 13–39)
BASOPHILS # BLD AUTO: 0.06 THOUSANDS/ÂΜL (ref 0–0.1)
BASOPHILS NFR BLD AUTO: 1 % (ref 0–1)
BILIRUB SERPL-MCNC: 1.01 MG/DL (ref 0.2–1)
BUN SERPL-MCNC: 11 MG/DL (ref 5–25)
CALCIUM SERPL-MCNC: 9 MG/DL (ref 8.4–10.2)
CHLORIDE SERPL-SCNC: 103 MMOL/L (ref 96–108)
CHOLEST SERPL-MCNC: 209 MG/DL
CO2 SERPL-SCNC: 29 MMOL/L (ref 21–32)
CREAT SERPL-MCNC: 0.95 MG/DL (ref 0.6–1.3)
CRP SERPL QL: 4.3 MG/L
EOSINOPHIL # BLD AUTO: 0.1 THOUSAND/ÂΜL (ref 0–0.61)
EOSINOPHIL NFR BLD AUTO: 1 % (ref 0–6)
ERYTHROCYTE [DISTWIDTH] IN BLOOD BY AUTOMATED COUNT: 11.8 % (ref 11.6–15.1)
EST. AVERAGE GLUCOSE BLD GHB EST-MCNC: 123 MG/DL
GFR SERPL CREATININE-BSD FRML MDRD: 87 ML/MIN/1.73SQ M
GLUCOSE P FAST SERPL-MCNC: 100 MG/DL (ref 65–99)
HBA1C MFR BLD: 5.9 %
HCT VFR BLD AUTO: 46.7 % (ref 36.5–49.3)
HDLC SERPL-MCNC: 33 MG/DL
HGB BLD-MCNC: 16.2 G/DL (ref 12–17)
IMM GRANULOCYTES # BLD AUTO: 0.09 THOUSAND/UL (ref 0–0.2)
IMM GRANULOCYTES NFR BLD AUTO: 1 % (ref 0–2)
LDLC SERPL CALC-MCNC: 137 MG/DL (ref 0–100)
LYMPHOCYTES # BLD AUTO: 2.95 THOUSANDS/ÂΜL (ref 0.6–4.47)
LYMPHOCYTES NFR BLD AUTO: 36 % (ref 14–44)
MCH RBC QN AUTO: 30.9 PG (ref 26.8–34.3)
MCHC RBC AUTO-ENTMCNC: 34.7 G/DL (ref 31.4–37.4)
MCV RBC AUTO: 89 FL (ref 82–98)
MONOCYTES # BLD AUTO: 0.71 THOUSAND/ÂΜL (ref 0.17–1.22)
MONOCYTES NFR BLD AUTO: 9 % (ref 4–12)
NEUTROPHILS # BLD AUTO: 4.32 THOUSANDS/ÂΜL (ref 1.85–7.62)
NEUTS SEG NFR BLD AUTO: 52 % (ref 43–75)
NONHDLC SERPL-MCNC: 176 MG/DL
NRBC BLD AUTO-RTO: 0 /100 WBCS
PLATELET # BLD AUTO: 194 THOUSANDS/UL (ref 149–390)
PMV BLD AUTO: 12.5 FL (ref 8.9–12.7)
POTASSIUM SERPL-SCNC: 3.7 MMOL/L (ref 3.5–5.3)
PROT SERPL-MCNC: 7.5 G/DL (ref 6.4–8.4)
PSA SERPL-MCNC: 0.53 NG/ML (ref 0–4)
RBC # BLD AUTO: 5.24 MILLION/UL (ref 3.88–5.62)
SODIUM SERPL-SCNC: 140 MMOL/L (ref 135–147)
TRIGL SERPL-MCNC: 196 MG/DL
TSH SERPL DL<=0.05 MIU/L-ACNC: 4.11 UIU/ML (ref 0.45–4.5)
WBC # BLD AUTO: 8.23 THOUSAND/UL (ref 4.31–10.16)

## 2023-10-31 PROCEDURE — 87591 N.GONORRHOEAE DNA AMP PROB: CPT

## 2023-10-31 PROCEDURE — 80061 LIPID PANEL: CPT

## 2023-10-31 PROCEDURE — 86140 C-REACTIVE PROTEIN: CPT

## 2023-10-31 PROCEDURE — 36415 COLL VENOUS BLD VENIPUNCTURE: CPT

## 2023-10-31 PROCEDURE — 87661 TRICHOMONAS VAGINALIS AMPLIF: CPT

## 2023-10-31 PROCEDURE — 85025 COMPLETE CBC W/AUTO DIFF WBC: CPT

## 2023-10-31 PROCEDURE — 87491 CHLMYD TRACH DNA AMP PROBE: CPT

## 2023-10-31 PROCEDURE — 83036 HEMOGLOBIN GLYCOSYLATED A1C: CPT

## 2023-10-31 PROCEDURE — G0103 PSA SCREENING: HCPCS

## 2023-10-31 PROCEDURE — 80053 COMPREHEN METABOLIC PANEL: CPT

## 2023-10-31 PROCEDURE — 93005 ELECTROCARDIOGRAM TRACING: CPT

## 2023-10-31 PROCEDURE — 84443 ASSAY THYROID STIM HORMONE: CPT

## 2023-11-01 LAB
C TRACH DNA SPEC QL NAA+PROBE: NEGATIVE
N GONORRHOEA DNA SPEC QL NAA+PROBE: NEGATIVE

## 2023-11-02 LAB
ATRIAL RATE: 65 BPM
P AXIS: -7 DEGREES
PR INTERVAL: 146 MS
QRS AXIS: 6 DEGREES
QRSD INTERVAL: 110 MS
QT INTERVAL: 400 MS
QTC INTERVAL: 416 MS
T VAGINALIS RRNA SPEC QL NAA+PROBE: NEGATIVE
T WAVE AXIS: 10 DEGREES
VENTRICULAR RATE: 65 BPM

## 2023-11-02 PROCEDURE — 93010 ELECTROCARDIOGRAM REPORT: CPT | Performed by: INTERNAL MEDICINE

## 2024-01-22 ENCOUNTER — RA CDI HCC (OUTPATIENT)
Dept: OTHER | Facility: HOSPITAL | Age: 59
End: 2024-01-22

## 2024-01-22 NOTE — PROGRESS NOTES
HCC coding opportunities       Chart reviewed, no opportunity found: CHART REVIEWED, NO OPPORTUNITY FOUND        Patients Insurance        Commercial Insurance: [employer]

## 2024-01-29 ENCOUNTER — TELEPHONE (OUTPATIENT)
Dept: GASTROENTEROLOGY | Facility: CLINIC | Age: 59
End: 2024-01-29

## 2024-01-29 ENCOUNTER — OFFICE VISIT (OUTPATIENT)
Dept: INTERNAL MEDICINE CLINIC | Facility: CLINIC | Age: 59
End: 2024-01-29
Payer: COMMERCIAL

## 2024-01-29 VITALS
WEIGHT: 202.8 LBS | OXYGEN SATURATION: 98 % | DIASTOLIC BLOOD PRESSURE: 80 MMHG | SYSTOLIC BLOOD PRESSURE: 122 MMHG | BODY MASS INDEX: 31.83 KG/M2 | TEMPERATURE: 98 F | HEIGHT: 67 IN | HEART RATE: 75 BPM

## 2024-01-29 DIAGNOSIS — K75.81 STEATOHEPATITIS: ICD-10-CM

## 2024-01-29 DIAGNOSIS — G47.33 OBSTRUCTIVE SLEEP APNEA: ICD-10-CM

## 2024-01-29 DIAGNOSIS — Z13.6 SCREENING FOR CARDIOVASCULAR CONDITION: ICD-10-CM

## 2024-01-29 DIAGNOSIS — Z13.0 SCREENING FOR DEFICIENCY ANEMIA: ICD-10-CM

## 2024-01-29 DIAGNOSIS — E78.2 MIXED HYPERLIPIDEMIA: Primary | ICD-10-CM

## 2024-01-29 DIAGNOSIS — J31.0 CHRONIC RHINITIS: ICD-10-CM

## 2024-01-29 DIAGNOSIS — K21.9 GASTROESOPHAGEAL REFLUX DISEASE WITHOUT ESOPHAGITIS: ICD-10-CM

## 2024-01-29 DIAGNOSIS — K76.0 NAFLD (NONALCOHOLIC FATTY LIVER DISEASE): ICD-10-CM

## 2024-01-29 DIAGNOSIS — R73.03 PREDIABETES: ICD-10-CM

## 2024-01-29 DIAGNOSIS — B35.1 ONYCHOMYCOSIS: ICD-10-CM

## 2024-01-29 PROCEDURE — 99214 OFFICE O/P EST MOD 30 MIN: CPT | Performed by: INTERNAL MEDICINE

## 2024-01-29 RX ORDER — ROSUVASTATIN CALCIUM 10 MG/1
10 TABLET, COATED ORAL DAILY
Qty: 90 TABLET | Refills: 3 | Status: SHIPPED | OUTPATIENT
Start: 2024-01-29

## 2024-01-29 RX ORDER — ROSUVASTATIN CALCIUM 10 MG/1
10 TABLET, COATED ORAL DAILY
Qty: 90 TABLET | Refills: 3 | Status: SHIPPED | OUTPATIENT
Start: 2024-01-29 | End: 2024-01-29

## 2024-01-29 NOTE — ASSESSMENT & PLAN NOTE
Lab Results   Component Value Date    SODIUM 140 10/31/2023    K 3.7 10/31/2023     10/31/2023    CO2 29 10/31/2023    AGAP 8 10/31/2023    BUN 11 10/31/2023    CREATININE 0.95 10/31/2023    GLUC 102 (H) 09/21/2019    GLUF 100 (H) 10/31/2023    CALCIUM 9.0 10/31/2023    AST 28 10/31/2023    ALT 61 (H) 10/31/2023    ALKPHOS 82 10/31/2023    TP 7.5 10/31/2023    TBILI 1.01 (H) 10/31/2023    EGFR 87 10/31/2023   Last ALT 61 very slightly high asymptomatic patient evaluated by GI    Avoid alcohol hepatotoxic drug    Repeat LFT before next visit stable

## 2024-01-29 NOTE — ASSESSMENT & PLAN NOTE
Moderate hepatic steatosis-mild elevation of transaminases.  -encourage weight loss.  -avoid hepatotoxic medications and limit alcohol use.    Seen by GI    As above reviewed    Labs reviewed as follows.lastp    Lab Results   Component Value Date    SODIUM 140 10/31/2023    K 3.7 10/31/2023     10/31/2023    CO2 29 10/31/2023    AGAP 8 10/31/2023    BUN 11 10/31/2023    CREATININE 0.95 10/31/2023    GLUC 102 (H) 09/21/2019    GLUF 100 (H) 10/31/2023    CALCIUM 9.0 10/31/2023    AST 28 10/31/2023    ALT 61 (H) 10/31/2023    ALKPHOS 82 10/31/2023    TP 7.5 10/31/2023    TBILI 1.01 (H) 10/31/2023    EGFR 87 10/31/2023   Above reviewed    Repeat CMP before next visit repeat LFT

## 2024-01-29 NOTE — ASSESSMENT & PLAN NOTE
Patient has a history of fatty liver and diagnosed onychomycosis cannot give antifungal for now patient with chooses to be seen by podiatrist podiatrist referral given

## 2024-01-29 NOTE — PATIENT INSTRUCTIONS
Hyperlipidemia   AMBULATORY CARE:   Hyperlipidemia  is a high level of lipids (fats) in your blood. These lipids include cholesterol or triglycerides. Lipids are made by your body. They also come from the foods you eat. Your body needs lipids to work properly, but high levels increase your risk for heart disease, heart attack, and stroke.  Call your local emergency number (911 in the [place]) or have someone call if:   You have any of the following signs of a heart attack:      Squeezing, pressure, or pain in your chest    You may  also have any of the following:     Discomfort or pain in your back, neck, jaw, stomach, or arm    Shortness of breath    Nausea or vomiting    Lightheadedness or a sudden cold sweat    You have any of the following signs of a stroke:      Numbness or drooping on one side of your face     Weakness in an arm or leg    Confusion or difficulty speaking    Dizziness, a severe headache, or vision loss    Call your doctor if:   You have questions or concerns about your condition or care.      Treatment  may first include lifestyle changes to help decrease your lipid levels. Your provider may recommend you work with a team to manage hyperlipidemia. The team may include medical experts such as a dietitian, an exercise or physical therapist, and a behavior therapist. Your family members may be included in helping you create lifestyle changes. You may also need to take medicine to lower your lipid levels. Some of the lifestyle changes you may need to make include the following:  Maintain a healthy weight.  Ask your healthcare provider what a healthy weight is for you. Ask your provider to help you create a weight loss plan, if needed. Weight loss can decrease your cholesterol and triglyceride levels.    Be physically active throughout the day.  Physical activity, such as exercise, lowers your cholesterol levels and helps you maintain a healthy weight. Get 30 minutes or more of aerobic exercise 4 to 6  days each week. You can split your exercise into four 10-minute workouts instead of 30 minutes at one time. Examples of aerobic exercises include walking briskly, swimming, or riding a bike. Work with your healthcare provider to plan the best exercise program for you. Also include strength training at least 2 times each week. Your healthcare providers can help you create a physical activity plan.            Do not smoke.  Nicotine and other chemicals in cigarettes and cigars can increase your risk for a heart attack and stroke. Ask your healthcare provider for information if you currently smoke and need help to quit. E-cigarettes or smokeless tobacco still contain nicotine. Talk to your healthcare provider before you use these products.    Eat heart-healthy foods.  A dietitian or your provider can give you more information on low-sodium plans or the DASH (Dietary Approaches to Stop Hypertension) eating plan. The DASH plan is low in sodium, processed sugar, unhealthy fats, and total fat. It is high in potassium, calcium, and fiber. It is high in potassium, calcium, and fiber. These can be found in vegetables, fruit, and whole-grain foods. The following are ways to get more heart-healthy foods:         Decrease the total amount of fat you eat.  Choose lean meats, fat-free or 1% fat milk, and low-fat dairy products, such as yogurt and cheese. Limit or do not eat red meat. Red meats are high in fat and cholesterol.    Replace unhealthy fats with healthy fats.  Unhealthy fats include saturated fat, trans fat, and cholesterol. Choose soft margarines that are low in saturated fat and have little or no trans fat. Monounsaturated fats are healthy fats. These are found in olive oil, canola oil, avocado, and nuts. Polyunsaturated fats are also healthy. These are found in fish, flaxseed, walnuts, and soybeans.    Eat 5 or more servings of fruits and vegetables every day.  They are low in calories and fat and a good source of  essential vitamins. Include dark green, red, and orange vegetables. Examples include spinach, kale, broccoli, and carrots.    Eat foods high in fiber.  Fiber can help lower your cholesterol levels. Choose whole grain, high-fiber foods. Good choices include whole-wheat breads or cereals, beans, peas, fruits, and vegetables.         Limit sodium (salt) as directed.  Too much sodium can affect your fluid balance and blood pressure. Your healthcare provider will tell you how much sodium and potassium are safe for you to have in a day. Your provider may recommend that you limit sodium to 2,300 mg a day. Your provider or a dietitian can help you find ways to limit sodium. For example, if you add salt while you cook, do not add more salt at the table. Check labels to find low-sodium or no-salt-added foods. Some low-sodium foods use potassium salts for flavor. Too much potassium can also cause health problems.       Ask your healthcare provider if it is okay for you to drink alcohol.  Alcohol can increase your cholesterol and triglyceride levels. Your provider can tell you how many drinks are okay to have within 24 hours and within 1 week. A drink of alcohol is 12 ounces of beer, 5 ounces of wine, or 1½ ounces of liquor.    Follow up with your doctor as directed:  You may need to return for more tests. Your healthcare provider may refer you to a dietitian. Write down your questions so you remember to ask them during your visits.  © Copyright [employer] 2023 Information is for End User's use only and may not be sold, redistributed or otherwise used for commercial purposes.  The above information is an  only. It is not intended as medical advice for individual conditions or treatments. Talk to your doctor, nurse or pharmacist before following any medical regimen to see if it is safe and effective for you.

## 2024-01-29 NOTE — TELEPHONE ENCOUNTER
Pt stopped in to schedule an appt with Dr Valero, pt is experiencing pain b& reflux issues. Next Anjum appt was pushed out pretty far, scheduled pt with JULI Batres tomorrow at 10:30

## 2024-01-29 NOTE — ASSESSMENT & PLAN NOTE
Complains of both nostril intermittent nasal congestion with redness and thick discharge being treated in the past with Bactroban cream we will refill Bactroban patient followed by ENT

## 2024-01-29 NOTE — ASSESSMENT & PLAN NOTE
Symptoms controlled no heartburn    Agree and continue medication management as follows    Pepcid 40 mg daily with diet instructed

## 2024-01-29 NOTE — ASSESSMENT & PLAN NOTE
Lab Results   Component Value Date    CHOLESTEROL 209 (H) 10/31/2023    CHOLESTEROL 201 (H) 05/12/2022    CHOLESTEROL 228 (H) 03/03/2021     Lab Results   Component Value Date    HDL 33 (L) 10/31/2023    HDL 33 (L) 05/12/2022    HDL 32 (L) 03/03/2021     Lab Results   Component Value Date    TRIG 196 (H) 10/31/2023    TRIG 203 (H) 05/12/2022    TRIG 147 03/03/2021     Lab Results   Component Value Date    NONHDLC 176 10/31/2023    NONHDLC 168 05/12/2022    NONHDLC 180 02/27/2020      Lab Results   Component Value Date    LDLCALC 137 (H) 10/31/2023   Above reviewed    Patient like to start statin discussed slightly abnormal LFT explained the side effects at length will repeat LFT and start Crestor 10 mg daily with a low-fat low-cholesterol diet

## 2024-01-29 NOTE — ASSESSMENT & PLAN NOTE
Lab Results   Component Value Date    HGBA1C 5.9 (H) 10/31/2023   Above reviewed    Repeat A1c before next visit    Instructed about the exercise low-calorie diet diabetic diet to lose weight exercise

## 2024-01-29 NOTE — PROGRESS NOTES
Dr. Iraheta's Office Visit Note  24     Ish Huggins 58 y.o. male MRN: [id number]  : 1965    Assessment:     1. Mixed hyperlipidemia  Assessment & Plan:  Lab Results   Component Value Date    CHOLESTEROL 209 (H) 10/31/2023    CHOLESTEROL 201 (H) 2022    CHOLESTEROL 228 (H) 2021     Lab Results   Component Value Date    HDL 33 (L) 10/31/2023    HDL 33 (L) 2022    HDL 32 (L) 2021     Lab Results   Component Value Date    TRIG 196 (H) 10/31/2023    TRIG 203 (H) 2022    TRIG 147 2021     Lab Results   Component Value Date    NONHDLC 176 10/31/2023    NONHDLC 168 2022    NONHDLC 180 2020      Lab Results   Component Value Date    LDLCALC 137 (H) 10/31/2023   Above reviewed    Patient like to start statin discussed slightly abnormal LFT explained the side effects at length will repeat LFT and start Crestor 10 mg daily with a low-fat low-cholesterol diet    Orders:  -     Comprehensive metabolic panel; Future  -     Lipid Panel with Direct LDL reflex; Future  -     rosuvastatin (CRESTOR) 10 MG tablet; Take 1 tablet (10 mg total) by mouth daily    2. Onychomycosis  Assessment & Plan:  Patient has a history of fatty liver and diagnosed onychomycosis cannot give antifungal for now patient with chooses to be seen by podiatrist podiatrist referral given    Orders:  -     Ambulatory Referral to Podiatry; Future  -     Comprehensive metabolic panel; Future    3. Screening for deficiency anemia  -     CBC and differential; Future    4. Prediabetes  Assessment & Plan:  Lab Results   Component Value Date    HGBA1C 5.9 (H) 10/31/2023   Above reviewed    Repeat A1c before next visit    Instructed about the exercise low-calorie diet diabetic diet to lose weight exercise    Orders:  -     Comprehensive metabolic panel; Future  -     Hemoglobin A1C; Future  -     Urinalysis with microscopic; Future    5. Screening for cardiovascular condition  -     Comprehensive metabolic  panel; Future    6. Chronic rhinitis  Assessment & Plan:  Complains of both nostril intermittent nasal congestion with redness and thick discharge being treated in the past with Bactroban cream we will refill Bactroban patient followed by ENT    Orders:  -     mupirocin (BACTROBAN) 2 % ointment; Apply topically 3 (three) times a day    7. Gastroesophageal reflux disease without esophagitis  Assessment & Plan:  Symptoms controlled no heartburn    Agree and continue medication management as follows    Pepcid 40 mg daily with diet instructed      8. Obstructive sleep apnea  Assessment & Plan:  Post uvula removal March 2020 uncomplicated postop course since then patient feeling better sleep apnea symptoms resolved  Above reviewed much improved advised follow-up sleep apnea study patient reluctant      9. Steatohepatitis  Assessment & Plan:  Moderate hepatic steatosis-mild elevation of transaminases.  -encourage weight loss.  -avoid hepatotoxic medications and limit alcohol use.    Seen by GI    As above reviewed    Labs reviewed as follows.[email or web address]    Lab Results   Component Value Date    SODIUM 140 10/31/2023    K 3.7 10/31/2023     10/31/2023    CO2 29 10/31/2023    AGAP 8 10/31/2023    BUN 11 10/31/2023    CREATININE 0.95 10/31/2023    GLUC 102 (H) 09/21/2019    GLUF 100 (H) 10/31/2023    CALCIUM 9.0 10/31/2023    AST 28 10/31/2023    ALT 61 (H) 10/31/2023    ALKPHOS 82 10/31/2023    TP 7.5 10/31/2023    TBILI 1.01 (H) 10/31/2023    EGFR 87 10/31/2023   Above reviewed    Repeat CMP before next visit repeat LFT      10. NAFLD (nonalcoholic fatty liver disease)  Assessment & Plan:  Lab Results   Component Value Date    SODIUM 140 10/31/2023    K 3.7 10/31/2023     10/31/2023    CO2 29 10/31/2023    AGAP 8 10/31/2023    BUN 11 10/31/2023    CREATININE 0.95 10/31/2023    GLUC 102 (H) 09/21/2019    GLUF 100 (H) 10/31/2023    CALCIUM 9.0 10/31/2023    AST 28 10/31/2023    ALT 61 (H) 10/31/2023    ALKPHOS 82  10/31/2023    TP 7.5 10/31/2023    TBILI 1.01 (H) 10/31/2023    EGFR 87 10/31/2023   Last ALT 61 very slightly high asymptomatic patient evaluated by GI    Avoid alcohol hepatotoxic drug    Repeat LFT before next visit stable            Discussion Summary and Plan:  Today's care plan and medications were reviewed with patient in detail and all their questions answered to their satisfaction.    Chief Complaint   Patient presents with   • Follow-up     Lab work was done.  Needs antibiotic cream for inside his nose. Thinks it was the bactroban onitment he was on.      Subjective:  Came in follow-up chronic medical condition listed under visit diagnosis history of fatty liver seen by GI in the past asymptomatic and like to be treated for onychomycosis due to the abnormal LFTs refer to podiatrist also all the labs reviewed started treatment for hyperlipidemia explained the side effects at length mainly abnormal LFT with patient has very mild abnormal ALT reviewed with the patient explained the risk at length repeat labs before the next visit        The following portions of the patient's history were reviewed and updated as appropriate: allergies, current medications, past family history, past medical history, past social history, past surgical history and problem list.    Review of Systems   Constitutional:  Positive for activity change. Negative for appetite change, chills, diaphoresis, fatigue, fever and unexpected weight change.   HENT:  Negative for congestion, dental problem, drooling, ear discharge, ear pain, facial swelling, hearing loss, mouth sores, nosebleeds, postnasal drip, rhinorrhea, sinus pressure, sneezing, sore throat, tinnitus, trouble swallowing and voice change.    Eyes:  Negative for photophobia, pain, discharge, redness, itching and visual disturbance.   Respiratory:  Negative for apnea, cough, choking, chest tightness, shortness of breath, wheezing and stridor.    Cardiovascular:  Negative for  chest pain, palpitations and leg swelling.   Gastrointestinal:  Negative for abdominal distention, abdominal pain, anal bleeding, blood in stool, constipation, diarrhea, nausea, rectal pain and vomiting.   Endocrine: Negative for cold intolerance, heat intolerance, polydipsia, polyphagia and polyuria.   Genitourinary:  Negative for decreased urine volume, difficulty urinating, dysuria, enuresis, flank pain, frequency, genital sores, hematuria and urgency.   Musculoskeletal:  Positive for arthralgias. Negative for back pain, gait problem, joint swelling, myalgias, neck pain and neck stiffness.   Skin:  Negative for color change, pallor, rash and wound.   Allergic/Immunologic: Negative.  Negative for environmental allergies, food allergies and immunocompromised state.   Neurological:  Negative for dizziness, tremors, seizures, syncope, facial asymmetry, speech difficulty, weakness, light-headedness, numbness and headaches.   Psychiatric/Behavioral:  Negative for agitation, behavioral problems, confusion, decreased concentration, dysphoric mood, hallucinations, self-injury, sleep disturbance and suicidal ideas. The patient is not nervous/anxious and is not hyperactive.          Historical Information   Patient Active Problem List   Diagnosis   • Daytime hypersomnolence   • MARIO (obstructive sleep apnea)   • Obstructive sleep apnea   • History of gastroesophageal reflux (GERD)   • Hyperlipidemia   • Chronic rhinitis   • Gastroesophageal reflux disease without esophagitis   • Steatohepatitis   • Obesity due to excess calories   • Tubular adenoma of colon   • NAFLD (nonalcoholic fatty liver disease)   • Trichomonas infection   • Candida albicans infection   • Onychomycosis   • Prediabetes   • Screening for cardiovascular condition     Past Medical History:   Diagnosis Date   • Abnormal weight gain     Last assessed 3/24/2014   • Allergic    • Anxiety    • Gastroesophageal reflux disease without esophagitis 3/2/2020   •  GERD (gastroesophageal reflux disease)    • Hemorrhoids    • History of gastroesophageal reflux (GERD)    • Hyperlipidemia     Last assessed 3/24/2014    • Sleep apnea     insurance not covering     Past Surgical History:   Procedure Laterality Date   • COLONOSCOPY     • IR BIOPSY LIVER RANDOM  5/17/2021   • MI PALATOPHARYNGOPLASTY N/A 3/13/2020    Procedure: UVULOPALATOPHARYNGOPLASTY (UPPP);  Surgeon: Antonio Olivia MD;  Location: [facility];  Service: ENT   • TONSILLECTOMY  03/16/2020   • UVULECTOMY  03/16/2020     Social History     Substance and Sexual Activity   Alcohol Use No     Social History     Substance and Sexual Activity   Drug Use No     Social History     Tobacco Use   Smoking Status Never   Smokeless Tobacco Never     Family History   Problem Relation Age of Onset   • Hypertension Mother    • No Known Problems Father    • COPD Maternal Uncle    • No Known Problems Sister    • No Known Problems Brother      Health Maintenance Due   Topic   • DTaP,Tdap,and Td Vaccines (1 - Tdap)   • Zoster Vaccine (1 of 2)   • Annual Physical    • Influenza Vaccine (1)   • COVID-19 Vaccine (4 - 2023-24 season)      Meds/Allergies       Current Outpatient Medications:   •  albuterol (ProAir HFA) 90 mcg/act inhaler, Inhale 2 puffs every 6 (six) hours as needed (cough, trouble breathing), Disp: 8.5 g, Rfl: 0  •  famotidine (PEPCID) 40 MG tablet, Take 1 tablet (40 mg total) by mouth daily at bedtime, Disp: 30 tablet, Rfl: 5  •  fexofenadine-pseudoephedrine (ALLEGRA-D)  MG per tablet, Take 1 tablet by mouth every 12 (twelve) hours, Disp: 30 tablet, Rfl: 3  •  fluticasone (FLONASE) 50 mcg/act nasal spray, 2 sprays into each nostril daily, Disp: 18.2 mL, Rfl: 0  •  ibuprofen (MOTRIN) 600 mg tablet, Take 1 tablet (600 mg total) by mouth every 6 (six) hours as needed for mild pain, Disp: 30 tablet, Rfl: 0  •  ketoconazole (NIZORAL) 2 % cream, Apply topically daily Apply twice a day affected area, Disp: 30 g, Rfl: 0  •   "mupirocin (BACTROBAN) 2 % ointment, Apply topically 3 (three) times a day, Disp: 22 g, Rfl: 2  •  olopatadine (PATANOL) 0.1 % ophthalmic solution, Administer 1 drop to the right eye 2 (two) times a day, Disp: 5 mL, Rfl: 0  •  rosuvastatin (CRESTOR) 10 MG tablet, Take 1 tablet (10 mg total) by mouth daily, Disp: 90 tablet, Rfl: 3      Objective:    Vitals:   /80   Pulse 75   Temp 98 °F (36.7 °C)   Ht 5' 7\" (1.702 m)   Wt 92 kg (202 lb 12.8 oz)   SpO2 98%   BMI 31.76 kg/m²   Body mass index is 31.76 kg/m².  Vitals:    01/29/24 0843   Weight: 92 kg (202 lb 12.8 oz)       Physical Exam  Vitals and nursing note reviewed.   Constitutional:       General: He is not in acute distress.     Appearance: He is well-developed. He is not ill-appearing, toxic-appearing or diaphoretic.   HENT:      Head: Normocephalic and atraumatic.      Right Ear: External ear normal.      Left Ear: External ear normal.      Nose: Nose normal.      Mouth/Throat:      Pharynx: No oropharyngeal exudate.   Eyes:      General: Lids are normal. Lids are everted, no foreign bodies appreciated. No scleral icterus.        Right eye: No discharge.         Left eye: No discharge.      Conjunctiva/sclera: Conjunctivae normal.      Pupils: Pupils are equal, round, and reactive to light.   Neck:      Thyroid: No thyromegaly.      Vascular: Normal carotid pulses. No carotid bruit, hepatojugular reflux or JVD.      Trachea: No tracheal tenderness or tracheal deviation.   Cardiovascular:      Rate and Rhythm: Normal rate and regular rhythm.      Pulses: Normal pulses.      Heart sounds: Normal heart sounds. No murmur heard.     No friction rub. No gallop.   Pulmonary:      Effort: Pulmonary effort is normal. No respiratory distress.      Breath sounds: Normal breath sounds. No stridor. No wheezing or rales.   Chest:      Chest wall: No tenderness.   Abdominal:      General: Bowel sounds are normal. There is no distension.      Palpations: Abdomen is " soft. There is no mass.      Tenderness: There is no abdominal tenderness. There is no guarding or rebound.   Musculoskeletal:         General: No tenderness or deformity. Normal range of motion.      Cervical back: Normal range of motion and neck supple. No edema, erythema or rigidity. No spinous process tenderness or muscular tenderness. Normal range of motion.   Lymphadenopathy:      Head:      Right side of head: No submental, submandibular, tonsillar, preauricular or posterior auricular adenopathy.      Left side of head: No submental, submandibular, tonsillar, preauricular, posterior auricular or occipital adenopathy.      Cervical: No cervical adenopathy.      Right cervical: No superficial, deep or posterior cervical adenopathy.     Left cervical: No superficial, deep or posterior cervical adenopathy.      Upper Body:      Right upper body: No pectoral adenopathy.      Left upper body: No pectoral adenopathy.   Skin:     General: Skin is warm and dry.      Coloration: Skin is not pale.      Findings: No erythema or rash.   Neurological:      General: No focal deficit present.      Mental Status: He is alert and oriented to person, place, and time.      Cranial Nerves: No cranial nerve deficit.      Sensory: No sensory deficit.      Motor: No tremor, abnormal muscle tone or seizure activity.      Coordination: Coordination normal.      Gait: Gait normal.      Deep Tendon Reflexes: Reflexes are normal and symmetric. Reflexes normal.   Psychiatric:         Behavior: Behavior normal.         Thought Content: Thought content normal.         Judgment: Judgment normal.         Lab Review   No visits with results within 2 Month(s) from this visit.   Latest known visit with results is:   Appointment on 10/31/2023   Component Date Value Ref Range Status   • Ventricular Rate 10/31/2023 65  BPM Final   • Atrial Rate 10/31/2023 65  BPM Final   • WY Interval 10/31/2023 146  ms Final   • QRSD Interval 10/31/2023 110  ms  Final   • QT Interval 10/31/2023 400  ms Final   • QTC Interval 10/31/2023 416  ms Final   • P Axis 10/31/2023 -7  degrees Final   • QRS Axis 10/31/2023 6  degrees Final   • T Wave Axis 10/31/2023 10  degrees Final         Patient Instructions   Hyperlipidemia   AMBULATORY CARE:   Hyperlipidemia  is a high level of lipids (fats) in your blood. These lipids include cholesterol or triglycerides. Lipids are made by your body. They also come from the foods you eat. Your body needs lipids to work properly, but high levels increase your risk for heart disease, heart attack, and stroke.  Call your local emergency number (911 in the [place]) or have someone call if:   You have any of the following signs of a heart attack:      Squeezing, pressure, or pain in your chest    You may  also have any of the following:     Discomfort or pain in your back, neck, jaw, stomach, or arm    Shortness of breath    Nausea or vomiting    Lightheadedness or a sudden cold sweat    You have any of the following signs of a stroke:      Numbness or drooping on one side of your face     Weakness in an arm or leg    Confusion or difficulty speaking    Dizziness, a severe headache, or vision loss    Call your doctor if:   You have questions or concerns about your condition or care.      Treatment  may first include lifestyle changes to help decrease your lipid levels. Your provider may recommend you work with a team to manage hyperlipidemia. The team may include medical experts such as a dietitian, an exercise or physical therapist, and a behavior therapist. Your family members may be included in helping you create lifestyle changes. You may also need to take medicine to lower your lipid levels. Some of the lifestyle changes you may need to make include the following:  Maintain a healthy weight.  Ask your healthcare provider what a healthy weight is for you. Ask your provider to help you create a weight loss plan, if needed. Weight loss can decrease  your cholesterol and triglyceride levels.    Be physically active throughout the day.  Physical activity, such as exercise, lowers your cholesterol levels and helps you maintain a healthy weight. Get 30 minutes or more of aerobic exercise 4 to 6 days each week. You can split your exercise into four 10-minute workouts instead of 30 minutes at one time. Examples of aerobic exercises include walking briskly, swimming, or riding a bike. Work with your healthcare provider to plan the best exercise program for you. Also include strength training at least 2 times each week. Your healthcare providers can help you create a physical activity plan.            Do not smoke.  Nicotine and other chemicals in cigarettes and cigars can increase your risk for a heart attack and stroke. Ask your healthcare provider for information if you currently smoke and need help to quit. E-cigarettes or smokeless tobacco still contain nicotine. Talk to your healthcare provider before you use these products.    Eat heart-healthy foods.  A dietitian or your provider can give you more information on low-sodium plans or the DASH (Dietary Approaches to Stop Hypertension) eating plan. The DASH plan is low in sodium, processed sugar, unhealthy fats, and total fat. It is high in potassium, calcium, and fiber. It is high in potassium, calcium, and fiber. These can be found in vegetables, fruit, and whole-grain foods. The following are ways to get more heart-healthy foods:         Decrease the total amount of fat you eat.  Choose lean meats, fat-free or 1% fat milk, and low-fat dairy products, such as yogurt and cheese. Limit or do not eat red meat. Red meats are high in fat and cholesterol.    Replace unhealthy fats with healthy fats.  Unhealthy fats include saturated fat, trans fat, and cholesterol. Choose soft margarines that are low in saturated fat and have little or no trans fat. Monounsaturated fats are healthy fats. These are found in olive oil,  canola oil, avocado, and nuts. Polyunsaturated fats are also healthy. These are found in fish, flaxseed, walnuts, and soybeans.    Eat 5 or more servings of fruits and vegetables every day.  They are low in calories and fat and a good source of essential vitamins. Include dark green, red, and orange vegetables. Examples include spinach, kale, broccoli, and carrots.    Eat foods high in fiber.  Fiber can help lower your cholesterol levels. Choose whole grain, high-fiber foods. Good choices include whole-wheat breads or cereals, beans, peas, fruits, and vegetables.         Limit sodium (salt) as directed.  Too much sodium can affect your fluid balance and blood pressure. Your healthcare provider will tell you how much sodium and potassium are safe for you to have in a day. Your provider may recommend that you limit sodium to 2,300 mg a day. Your provider or a dietitian can help you find ways to limit sodium. For example, if you add salt while you cook, do not add more salt at the table. Check labels to find low-sodium or no-salt-added foods. Some low-sodium foods use potassium salts for flavor. Too much potassium can also cause health problems.       Ask your healthcare provider if it is okay for you to drink alcohol.  Alcohol can increase your cholesterol and triglyceride levels. Your provider can tell you how many drinks are okay to have within 24 hours and within 1 week. A drink of alcohol is 12 ounces of beer, 5 ounces of wine, or 1½ ounces of liquor.    Follow up with your doctor as directed:  You may need to return for more tests. Your healthcare provider may refer you to a dietitian. Write down your questions so you remember to ask them during your visits.  © Copyright [employer] 2023 Information is for End User's use only and may not be sold, redistributed or otherwise used for commercial purposes.  The above information is an  only. It is not intended as medical advice for individual conditions  "or treatments. Talk to your doctor, nurse or pharmacist before following any medical regimen to see if it is safe and effective for you.       Vasquez Iraheta MD        \"This note has been constructed using a voice recognition system.Therefore there may be syntax, spelling, and/or grammatical errors. Please call if you have any questions. \"  "

## 2024-01-29 NOTE — ASSESSMENT & PLAN NOTE
Post uvula removal March 2020 uncomplicated postop course since then patient feeling better sleep apnea symptoms resolved  Above reviewed much improved advised follow-up sleep apnea study patient reluctant

## 2024-01-30 ENCOUNTER — OFFICE VISIT (OUTPATIENT)
Dept: GASTROENTEROLOGY | Facility: CLINIC | Age: 59
End: 2024-01-30
Payer: COMMERCIAL

## 2024-01-30 VITALS
WEIGHT: 201.8 LBS | HEART RATE: 75 BPM | HEIGHT: 67 IN | DIASTOLIC BLOOD PRESSURE: 80 MMHG | SYSTOLIC BLOOD PRESSURE: 122 MMHG | BODY MASS INDEX: 31.67 KG/M2

## 2024-01-30 DIAGNOSIS — K21.9 GASTROESOPHAGEAL REFLUX DISEASE, UNSPECIFIED WHETHER ESOPHAGITIS PRESENT: Primary | ICD-10-CM

## 2024-01-30 DIAGNOSIS — Z86.010 HISTORY OF COLON POLYPS: ICD-10-CM

## 2024-01-30 DIAGNOSIS — R13.19 ESOPHAGEAL DYSPHAGIA: ICD-10-CM

## 2024-01-30 DIAGNOSIS — R79.89 ELEVATED LFTS: ICD-10-CM

## 2024-01-30 DIAGNOSIS — K76.0 FATTY LIVER: ICD-10-CM

## 2024-01-30 PROCEDURE — 99214 OFFICE O/P EST MOD 30 MIN: CPT | Performed by: PHYSICIAN ASSISTANT

## 2024-01-30 RX ORDER — TAMSULOSIN HYDROCHLORIDE 0.4 MG/1
0.8 CAPSULE ORAL DAILY
COMMUNITY
Start: 2024-01-10

## 2024-01-30 RX ORDER — OMEPRAZOLE 40 MG/1
40 CAPSULE, DELAYED RELEASE ORAL DAILY
Qty: 30 CAPSULE | Refills: 3 | Status: SHIPPED | OUTPATIENT
Start: 2024-01-30

## 2024-01-30 NOTE — PROGRESS NOTES
Assessment and Plan    #1. GERD with esophageal dysphagia: worsening GERD symptoms recently, dry throat, bile taste in mouth, substernal chest discomfort, worse at night, is taking pepcid 40 mg before bed, does admit to eating close to bedtime, some solid food dysphagia which is new.  -start omeprazole 40 mg in AM, can continue pepcid in PM  -antireflux diet and measures discussed, discussed to try to avoid eating within 3 hours of bedtime  -plan for EGD, rule out esophagitis, fuentes's, esophageal stricture or web due to dysphagia    #2. History of colon polyps: last colonoscopy in 2020, repeat recommended in 3 years.  -plan for colonoscopy at same time as EGD  -discussed procedure, risks including perforation, infection, and bleeding, and benefits in detail with patient   -clenpiq ordered  -no diabetes or blood thinners     #3. Fatty liver disease with elevated LFTs: recent labs with AST 61, t bili 1.01. had MRI in 2021 with fatty liver, biopsy of liver in 2021 with fatty liver and mild fibrosis.   -recommend US elastography at this time  -Discussed low carb/low fat diet, weight loss, exercise, limiting alcohol use, controlling blood sugar and cholesterol management    --------------------------------------------------------------------------------------------------------------------    Chief Complaint: f/u GERD, colon polyps     HPI: Ish Huggins is a 58 y.o. male with GERD, fatty liver disease, history of colon polyps, sleep apnea, HLD, prediabetes who presents today for follow up. He reports worsening reflux symptoms recently. Has a dry throat, bile acid taste in mouth and substernal chest discomfort. This has been worse at night. He admits to eating close to bedtime. He also reports more recent solid food esophageal dysphagia which clears with drinking liquids. Denies regurgitation, nausea, or vomiting. No abdominal pain. His bowels have been normal for the most part except for some urgency when he tried a  probiotic a week ago, which has resolved since stopping it. Denies blood in stool or black stools. No weight loss. Denies excessive alcohol use. Occasionally gets gas pains.    Last EGD and colonoscopy was in 2020. EGD was notable for gastritis. Colonoscopy was notable for polyps which were removed. He was recommended repeat colonoscopy in 3 years.     Review of Systems:   General: negative for fatigue, fever, night sweats or unexpected weight loss  Psychological: negative for anxiety or depression  Ophthalmic: negative for blurry vision or scleral icterus  ENT: negative for headaches, oral lesions, sore throat, vocal changes; dry throat, dysphagia  Hematological and Lymphatic: negative for pallor or swollen lymph nodes  Respiratory: negative for cough, shortness of breath or wheezing  Cardiovascular: negative for chest pain, edema or murmur  Gastrointestinal: as mentioned in HPI  Genito-Urinary: negative for dysuria or incontinence  Musculoskeletal: negative for joint pain, joint stiffness or joint swelling  Dermatological: negative for pruritus, rash, or jaundice    Current Medications  Current Outpatient Medications   Medication Sig Dispense Refill    albuterol (ProAir HFA) 90 mcg/act inhaler Inhale 2 puffs every 6 (six) hours as needed (cough, trouble breathing) 8.5 g 0    famotidine (PEPCID) 40 MG tablet Take 1 tablet (40 mg total) by mouth daily at bedtime 30 tablet 5    fexofenadine-pseudoephedrine (ALLEGRA-D)  MG per tablet Take 1 tablet by mouth every 12 (twelve) hours 30 tablet 3    fluticasone (FLONASE) 50 mcg/act nasal spray 2 sprays into each nostril daily 18.2 mL 0    ibuprofen (MOTRIN) 600 mg tablet Take 1 tablet (600 mg total) by mouth every 6 (six) hours as needed for mild pain 30 tablet 0    ketoconazole (NIZORAL) 2 % cream Apply topically daily Apply twice a day affected area 30 g 0    mupirocin (BACTROBAN) 2 % ointment Apply topically 3 (three) times a day 22 g 2    olopatadine (PATANOL)  0.1 % ophthalmic solution Administer 1 drop to the right eye 2 (two) times a day 5 mL 0    omeprazole (PriLOSEC) 40 MG capsule Take 1 capsule (40 mg total) by mouth daily 30 capsule 3    rosuvastatin (CRESTOR) 10 MG tablet Take 1 tablet (10 mg total) by mouth daily 90 tablet 3    sodium picosulfate, magnesium oxide, citric acid (Clenpiq) oral solution Take 350 mL by mouth 1 (one) time for 1 dose Per office instructions 350 mL 0    tamsulosin (FLOMAX) 0.4 mg Take 0.8 mg by mouth daily       No current facility-administered medications for this visit.       Past Medical History  Past Medical History:   Diagnosis Date    Abnormal weight gain     Last assessed 3/24/2014    Allergic     Anxiety     Gastroesophageal reflux disease without esophagitis 3/2/2020    GERD (gastroesophageal reflux disease)     Hemorrhoids     History of gastroesophageal reflux (GERD)     Hyperlipidemia     Last assessed 3/24/2014     Sleep apnea     insurance not covering       Past Surgical History  Past Surgical History:   Procedure Laterality Date    COLONOSCOPY      IR BIOPSY LIVER RANDOM  5/17/2021    [place] PALATOPHARYNGOPLASTY N/A 3/13/2020    Procedure: UVULOPALATOPHARYNGOPLASTY (UPPP);  Surgeon: Antonio Olivia MD;  Location: [facility];  Service: ENT    TONSILLECTOMY  03/16/2020    UVULECTOMY  03/16/2020       Past Social History   Social History     Socioeconomic History    Marital status: /Civil Union     Spouse name: None    Number of children: None    Years of education: None    Highest education level: None   Occupational History    None   Tobacco Use    Smoking status: Never    Smokeless tobacco: Never   Vaping Use    Vaping status: Never Used   Substance and Sexual Activity    Alcohol use: No    Drug use: No    Sexual activity: Yes     Comment: Sexually active, high risk - As per [employer]    Other Topics Concern    None   Social History Narrative    None     Social Determinants of Health     Financial Resource Strain: Low Risk  " (3/17/2021)    Overall Financial Resource Strain (CARDIA)     Difficulty of Paying Living Expenses: Not hard at all   Food Insecurity: No Food Insecurity (3/17/2021)    Hunger Vital Sign     Worried About Running Out of Food in the Last Year: Never true     Ran Out of Food in the Last Year: Never true   Transportation Needs: No Transportation Needs (3/17/2021)    PRAPARE - Transportation     Lack of Transportation (Medical): No     Lack of Transportation (Non-Medical): No   Physical Activity: Not on file   Stress: Not on file   Social Connections: Unknown (3/17/2021)    Social Connection and Isolation Panel [NHANES]     Frequency of Communication with Friends and Family: More than three times a week     Frequency of Social Gatherings with Friends and Family: More than three times a week     Attends Mandaen Services: Not on file     Active Member of Clubs or Organizations: Not on file     Attends Club or Organization Meetings: Not on file     Marital Status: Not on file   Intimate Partner Violence: Not on file   Housing Stability: Not on file       The following portions of the patient's history were reviewed and updated as appropriate: allergies, current medications, past family history, past medical history, past social history, past surgical history, and problem list.    Vital Signs  Vitals:    01/30/24 1039   BP: 122/80   BP Location: Left arm   Patient Position: Sitting   Cuff Size: Standard   Pulse: 75   Weight: 91.5 kg (201 lb 12.8 oz)   Height: 5' 7\" (1.702 m)       Physical Exam:  General appearance: alert, cooperative, no distress  HEENT: normocephalic, anicteric, no eye erythema or discharge, no oropharyngeal thrush  Neck: supple, trachea midline, no adenopathy  Lungs: CTA b/l, no rales, rhonchi, or wheezing, unlabored respirations  Heart: RRR, no murmur, rubs, or gallops  Abdomen: soft, non-tender, obese abdomen, non-distended, normal bowel sounds, no masses or organomegaly  Rectal: " deferred  Extremities: no cyanosis, clubbing, or edema  Musculoskeletal: normal gait  Skin: color and texture normal, no jaundice, no rashes or lesions  Psychiatric: alert and oriented, normal affect and behavior

## 2024-01-30 NOTE — PATIENT INSTRUCTIONS
GERD (Gastroesophageal Reflux Disease)   AMBULATORY CARE:   Gastroesophageal reflux disease (GERD)  is reflux that happens more than 2 times a week for a few weeks. Reflux means acid and food in your stomach back up into your esophagus. GERD can cause other health problems over time if it is not treated.       Common causes of GERD:  GERD often happens because the lower muscle (sphincter) of the esophagus does not close properly. The sphincter normally opens to let food into the stomach. It then closes to keep food and stomach acid in the stomach. If the sphincter does not close properly, stomach acid and food back up (reflux) into the esophagus. The following may increase your risk for GERD:  Certain foods such as spicy foods, chocolate, foods that contain caffeine, peppermint, and fried foods    Hiatal hernia    Certain medicines such as calcium channel blockers (used to treat high blood pressure), allergy medicines, sedatives, or antidepressants    Pregnancy, obesity, or scleroderma    Lying down after a meal    Drinking alcohol or smoking cigarettes    Signs and symptoms:   Heartburn (burning pain in your chest)    Pain after meals that spreads to your neck, jaw, or shoulder    Pain that gets better when you change positions    Bitter or acid taste in your mouth    A dry cough    Trouble swallowing or pain with swallowing    Hoarseness or a sore throat    Burping or hiccups    Feeling full soon after you start eating    Call your local emergency number (911 in the [place]) if:   You have severe chest pain and sudden trouble breathing.      Seek care immediately if:   You have trouble breathing after you vomit.    You have trouble swallowing, or pain with swallowing.    Your bowel movements are black, bloody, or tarry-looking.    Your vomit looks like coffee grounds or has blood in it.    Call your doctor or gastroenterologist if:   You feel full and cannot burp or vomit.    You vomit large amounts, or you vomit  often.    You are losing weight without trying.    Your symptoms get worse or do not improve with treatment.    You have questions or concerns about your condition or care.    Treatment for GERD:   Medicines  are used to decrease stomach acid. Medicine may also be used to help your lower esophageal sphincter and stomach contract (tighten) more.    Surgery  is done to wrap the upper part of the stomach around the esophageal sphincter. This will strengthen the sphincter and prevent reflux.    Manage GERD:       Do not have foods or drinks that may increase heartburn.  These include chocolate, peppermint, fried or fatty foods, drinks that contain caffeine, or carbonated drinks (soda). Other foods include spicy foods, onions, tomatoes, and tomato-based foods. Do not have foods or drinks that can irritate your esophagus, such as citrus fruits, juices, and alcohol.    Do not eat large meals.  When you eat a lot of food at one time, your stomach needs more acid to digest it. Eat 6 small meals each day instead of 3 large meals, and eat slowly. Do not eat meals 2 to 3 hours before bedtime.    Elevate the head of your bed.  Place 6-inch blocks under the head of your bed frame. You may also use more than one pillow under your head and shoulders while you sleep.    Maintain a healthy weight.  If you are overweight, weight loss may help relieve symptoms of GERD.    Do not smoke.  Smoking weakens the lower esophageal sphincter and increases the risk of GERD. Ask your healthcare provider for information if you currently smoke and need help to quit. E-cigarettes or smokeless tobacco still contain nicotine. Talk to your healthcare provider before you use these products.    Do not put pressure on your abdomen.  Pressure pushes acid up into your esophagus. Do not wear clothing that is tight around your waist. Do not bend over. Bend at the knees if you need to pick something up.  Follow up with your doctor or gastroenterologist as  directed:  Write down your questions so you remember to ask them during your visits.  © Copyright [employer] 2023 Information is for End User's use only and may not be sold, redistributed or otherwise used for commercial purposes.  The above information is an  only. It is not intended as medical advice for individual conditions or treatments. Talk to your doctor, nurse or pharmacist before following any medical regimen to see if it is safe and effective for you.    Scheduled date of EGD/colonoscopy (as of today): 3/11/24  Physician performing EGD/colonoscopy: [facility]  Location of EGD/colonoscopy: [facility]  Desired bowel prep reviewed with patient: Clenpiq  Instructions reviewed with patient by: Blaire  Clearances:  none

## 2024-01-30 NOTE — LETTER
January 30, 2024     Vasquez Iraheta MD  [address]  [address]  [place] [address]    Patient: Ish Huggins   YOB: 1965   Date of Visit: 1/30/2024       Dear Dr. Iraheta:    Thank you for referring Ish Huggins to me for evaluation. Below are my notes for this consultation.    If you have questions, please do not hesitate to call me. I look forward to following your patient along with you.         Sincerely,        Neha Batres PA-C        CC: No Recipients    Neha Batres PA-C  1/30/2024 11:26 AM  Sign when Signing Visit  Assessment and Plan    #1. GERD with esophageal dysphagia: worsening GERD symptoms recently, dry throat, bile taste in mouth, substernal chest discomfort, worse at night, is taking pepcid 40 mg before bed, does admit to eating close to bedtime, some solid food dysphagia which is new.  -start omeprazole 40 mg in AM, can continue pepcid in PM  -antireflux diet and measures discussed, discussed to try to avoid eating within 3 hours of bedtime  -plan for EGD, rule out esophagitis, fuentes's, esophageal stricture or web due to dysphagia    #2. History of colon polyps: last colonoscopy in 2020, repeat recommended in 3 years.  -plan for colonoscopy at same time as EGD  -discussed procedure, risks including perforation, infection, and bleeding, and benefits in detail with patient   -clenpiq ordered  -no diabetes or blood thinners     #3. Fatty liver disease with elevated LFTs: recent labs with AST 61, t bili 1.01. had MRI in 2021 with fatty liver, biopsy of liver in 2021 with fatty liver and mild fibrosis.   -recommend US elastography at this time  -Discussed low carb/low fat diet, weight loss, exercise, limiting alcohol use, controlling blood sugar and cholesterol management    --------------------------------------------------------------------------------------------------------------------    Chief Complaint: f/u GERD, colon polyps     HPI: Ish Huggins is a 58  y.o. male with GERD, fatty liver disease, history of colon polyps, sleep apnea, HLD, prediabetes who presents today for follow up. He reports worsening reflux symptoms recently. Has a dry throat, bile acid taste in mouth and substernal chest discomfort. This has been worse at night. He admits to eating close to bedtime. He also reports more recent solid food esophageal dysphagia which clears with drinking liquids. Denies regurgitation, nausea, or vomiting. No abdominal pain. His bowels have been normal for the most part except for some urgency when he tried a probiotic a week ago, which has resolved since stopping it. Denies blood in stool or black stools. No weight loss. Denies excessive alcohol use. Occasionally gets gas pains.    Last EGD and colonoscopy was in 2020. EGD was notable for gastritis. Colonoscopy was notable for polyps which were removed. He was recommended repeat colonoscopy in 3 years.     Review of Systems:   General: negative for fatigue, fever, night sweats or unexpected weight loss  Psychological: negative for anxiety or depression  Ophthalmic: negative for blurry vision or scleral icterus  ENT: negative for headaches, oral lesions, sore throat, vocal changes; dry throat, dysphagia  Hematological and Lymphatic: negative for pallor or swollen lymph nodes  Respiratory: negative for cough, shortness of breath or wheezing  Cardiovascular: negative for chest pain, edema or murmur  Gastrointestinal: as mentioned in HPI  Genito-Urinary: negative for dysuria or incontinence  Musculoskeletal: negative for joint pain, joint stiffness or joint swelling  Dermatological: negative for pruritus, rash, or jaundice    Current Medications  Current Outpatient Medications   Medication Sig Dispense Refill   • albuterol (ProAir HFA) 90 mcg/act inhaler Inhale 2 puffs every 6 (six) hours as needed (cough, trouble breathing) 8.5 g 0   • famotidine (PEPCID) 40 MG tablet Take 1 tablet (40 mg total) by mouth daily at  bedtime 30 tablet 5   • fexofenadine-pseudoephedrine (ALLEGRA-D)  MG per tablet Take 1 tablet by mouth every 12 (twelve) hours 30 tablet 3   • fluticasone (FLONASE) 50 mcg/act nasal spray 2 sprays into each nostril daily 18.2 mL 0   • ibuprofen (MOTRIN) 600 mg tablet Take 1 tablet (600 mg total) by mouth every 6 (six) hours as needed for mild pain 30 tablet 0   • ketoconazole (NIZORAL) 2 % cream Apply topically daily Apply twice a day affected area 30 g 0   • mupirocin (BACTROBAN) 2 % ointment Apply topically 3 (three) times a day 22 g 2   • olopatadine (PATANOL) 0.1 % ophthalmic solution Administer 1 drop to the right eye 2 (two) times a day 5 mL 0   • omeprazole (PriLOSEC) 40 MG capsule Take 1 capsule (40 mg total) by mouth daily 30 capsule 3   • rosuvastatin (CRESTOR) 10 MG tablet Take 1 tablet (10 mg total) by mouth daily 90 tablet 3   • sodium picosulfate, magnesium oxide, citric acid (Clenpiq) oral solution Take 350 mL by mouth 1 (one) time for 1 dose Per office instructions 350 mL 0   • tamsulosin (FLOMAX) 0.4 mg Take 0.8 mg by mouth daily       No current facility-administered medications for this visit.       Past Medical History  Past Medical History:   Diagnosis Date   • Abnormal weight gain     Last assessed 3/24/2014   • Allergic    • Anxiety    • Gastroesophageal reflux disease without esophagitis 3/2/2020   • GERD (gastroesophageal reflux disease)    • Hemorrhoids    • History of gastroesophageal reflux (GERD)    • Hyperlipidemia     Last assessed 3/24/2014    • Sleep apnea     insurance not covering       Past Surgical History  Past Surgical History:   Procedure Laterality Date   • COLONOSCOPY     • IR BIOPSY LIVER RANDOM  5/17/2021   • [place] PALATOPHARYNGOPLASTY N/A 3/13/2020    Procedure: UVULOPALATOPHARYNGOPLASTY (UPPP);  Surgeon: Antonio Olivia MD;  Location: [facility];  Service: ENT   • TONSILLECTOMY  03/16/2020   • UVULECTOMY  03/16/2020       Past Social History   Social History      Socioeconomic History   • Marital status: /Civil Union     Spouse name: None   • Number of children: None   • Years of education: None   • Highest education level: None   Occupational History   • None   Tobacco Use   • Smoking status: Never   • Smokeless tobacco: Never   Vaping Use   • Vaping status: Never Used   Substance and Sexual Activity   • Alcohol use: No   • Drug use: No   • Sexual activity: Yes     Comment: Sexually active, high risk - As per [employer]    Other Topics Concern   • None   Social History Narrative   • None     Social Determinants of Health     Financial Resource Strain: Low Risk  (3/17/2021)    Overall Financial Resource Strain (CARDIA)    • Difficulty of Paying Living Expenses: Not hard at all   Food Insecurity: No Food Insecurity (3/17/2021)    Hunger Vital Sign    • Worried About Running Out of Food in the Last Year: Never true    • Ran Out of Food in the Last Year: Never true   Transportation Needs: No Transportation Needs (3/17/2021)    PRAPARE - Transportation    • Lack of Transportation (Medical): No    • Lack of Transportation (Non-Medical): No   Physical Activity: Not on file   Stress: Not on file   Social Connections: Unknown (3/17/2021)    Social Connection and Isolation Panel [NHANES]    • Frequency of Communication with Friends and Family: More than three times a week    • Frequency of Social Gatherings with Friends and Family: More than three times a week    • Attends Jew Services: Not on file    • Active Member of Clubs or Organizations: Not on file    • Attends Club or Organization Meetings: Not on file    • Marital Status: Not on file   Intimate Partner Violence: Not on file   Housing Stability: Not on file       The following portions of the patient's history were reviewed and updated as appropriate: allergies, current medications, past family history, past medical history, past social history, past surgical history, and problem list.    Vital Signs  Vitals:  "   01/30/24 1039   BP: 122/80   BP Location: Left arm   Patient Position: Sitting   Cuff Size: Standard   Pulse: 75   Weight: 91.5 kg (201 lb 12.8 oz)   Height: 5' 7\" (1.702 m)       Physical Exam:  General appearance: alert, cooperative, no distress  HEENT: normocephalic, anicteric, no eye erythema or discharge, no oropharyngeal thrush  Neck: supple, trachea midline, no adenopathy  Lungs: CTA b/l, no rales, rhonchi, or wheezing, unlabored respirations  Heart: RRR, no murmur, rubs, or gallops  Abdomen: soft, non-tender, obese abdomen, non-distended, normal bowel sounds, no masses or organomegaly  Rectal: deferred  Extremities: no cyanosis, clubbing, or edema  Musculoskeletal: normal gait  Skin: color and texture normal, no jaundice, no rashes or lesions  Psychiatric: alert and oriented, normal affect and behavior                                                                  "

## 2024-02-05 ENCOUNTER — OFFICE VISIT (OUTPATIENT)
Age: 59
End: 2024-02-05
Payer: COMMERCIAL

## 2024-02-05 VITALS
WEIGHT: 201 LBS | BODY MASS INDEX: 31.55 KG/M2 | HEART RATE: 86 BPM | DIASTOLIC BLOOD PRESSURE: 81 MMHG | SYSTOLIC BLOOD PRESSURE: 159 MMHG | HEIGHT: 67 IN

## 2024-02-05 DIAGNOSIS — B35.1 ONYCHOMYCOSIS: ICD-10-CM

## 2024-02-05 DIAGNOSIS — B35.3 TINEA PEDIS OF BOTH FEET: Primary | ICD-10-CM

## 2024-02-05 PROCEDURE — 88305 TISSUE EXAM BY PATHOLOGIST: CPT | Performed by: STUDENT IN AN ORGANIZED HEALTH CARE EDUCATION/TRAINING PROGRAM

## 2024-02-05 PROCEDURE — 99203 OFFICE O/P NEW LOW 30 MIN: CPT | Performed by: STUDENT IN AN ORGANIZED HEALTH CARE EDUCATION/TRAINING PROGRAM

## 2024-02-05 PROCEDURE — 88312 SPECIAL STAINS GROUP 1: CPT | Performed by: STUDENT IN AN ORGANIZED HEALTH CARE EDUCATION/TRAINING PROGRAM

## 2024-02-05 RX ORDER — SODIUM PICOSULFATE, MAGNESIUM OXIDE, AND ANHYDROUS CITRIC ACID 12; 3.5; 1 G/175ML; G/175ML; MG/175ML
LIQUID ORAL
COMMUNITY
Start: 2024-01-30

## 2024-02-05 NOTE — PROGRESS NOTES
"This patient was seen on 2/5/2024.    My role is Foot , Ankle, and Wound Specialist    ASSESSMENT     Diagnoses and all orders for this visit:    Onychomycosis  -     Ambulatory Referral to Podiatry  -     Hepatic function panel; Future  -     Tissue Exam    Other orders  -     Clenpiq oral solution;  (Patient not taking: Reported on 2/5/2024)         Problem List Items Addressed This Visit          Musculoskeletal and Integument    Onychomycosis    Relevant Orders    Hepatic function panel    Tissue Exam     PLAN  -Patient was educated regarding their condition  -Biopsy of nail sent for PAS stain  -If the cultures come back positive, we will pursue a course of PO terbinafine  -F/u hepatic function panel in anticipation of terbinafine treatment  -RTC in 6-months    SUBJECTIVE    Chief Complaint:  Right big toenail discoloration     Patient ID: Ish Huggins     2/5/2024: Ish is a pleasant 58-year-old male who presents today with discoloration to his toenails of his right foot.  He states that this been going on for approximately 3 years.  He states that he has only attempted over-the-counter treatments and is currently using tea tree oil.        The following portions of the patient's history were reviewed and updated as appropriate: allergies, current medications, past family history, past medical history, past social history, past surgical history and problem list.    Review of Systems   Constitutional: Negative.    Respiratory: Negative.     Cardiovascular: Negative.    Gastrointestinal: Negative.    Genitourinary: Negative.    Musculoskeletal: Negative.    Skin:  Positive for color change.         OBJECTIVE      /81   Pulse 86   Ht 5' 7\" (1.702 m)   Wt 91.2 kg (201 lb)   BMI 31.48 kg/m²        Physical Exam  Constitutional:       Appearance: Normal appearance.   HENT:      Head: Normocephalic and atraumatic.   Eyes:      General:         Right eye: No discharge.         Left eye: No discharge. "   Cardiovascular:      Rate and Rhythm: Normal rate and regular rhythm.      Pulses:           Dorsalis pedis pulses are 2+ on the right side and 2+ on the left side.        Posterior tibial pulses are 2+ on the right side and 2+ on the left side.   Pulmonary:      Effort: Pulmonary effort is normal.      Breath sounds: Normal breath sounds.   Skin:     General: Skin is warm.      Capillary Refill: Capillary refill takes less than 2 seconds.   Neurological:      Mental Status: He is alert and oriented to person, place, and time.      Sensory: Sensation is intact. No sensory deficit.   Psychiatric:         Mood and Affect: Mood normal.         Vascular:  -DP and PT pulses intact b/l  -Capillary refill time <2 sec b/l    MSK:  -No pain on palpation noted  -MMT is 5/5 to all muscle compartments of the lower extremity  -No gross deformity noted    Neuro:  -Light sensation intact bilaterally  -Protective sensation intact bilaterally    Derm:  -I note maceration, fissuring, erythema, vesicular rash with peeling of the bilateral lower extremity. This is consistent with tinea pedis  -No callus formation noted on exam  -Patient's toe nails x3 are elongated, thickened, discolored, and dystrophic with subungual debris: These findings are consistent with onychomycosis

## 2024-02-07 PROCEDURE — 88312 SPECIAL STAINS GROUP 1: CPT | Performed by: STUDENT IN AN ORGANIZED HEALTH CARE EDUCATION/TRAINING PROGRAM

## 2024-02-07 PROCEDURE — 88305 TISSUE EXAM BY PATHOLOGIST: CPT | Performed by: STUDENT IN AN ORGANIZED HEALTH CARE EDUCATION/TRAINING PROGRAM

## 2024-02-08 ENCOUNTER — TELEPHONE (OUTPATIENT)
Age: 59
End: 2024-02-08

## 2024-02-14 ENCOUNTER — APPOINTMENT (OUTPATIENT)
Dept: LAB | Facility: HOSPITAL | Age: 59
End: 2024-02-14
Payer: COMMERCIAL

## 2024-02-14 DIAGNOSIS — B35.1 ONYCHOMYCOSIS: ICD-10-CM

## 2024-02-14 DIAGNOSIS — R73.03 PREDIABETES: ICD-10-CM

## 2024-02-14 DIAGNOSIS — Z13.6 SCREENING FOR CARDIOVASCULAR CONDITION: ICD-10-CM

## 2024-02-14 DIAGNOSIS — E78.2 MIXED HYPERLIPIDEMIA: ICD-10-CM

## 2024-02-14 DIAGNOSIS — Z13.0 SCREENING FOR DEFICIENCY ANEMIA: ICD-10-CM

## 2024-02-14 LAB
ALBUMIN SERPL BCP-MCNC: 4.6 G/DL (ref 3.5–5)
ALP SERPL-CCNC: 68 U/L (ref 34–104)
ALT SERPL W P-5'-P-CCNC: 46 U/L (ref 7–52)
ANION GAP SERPL CALCULATED.3IONS-SCNC: 8 MMOL/L
AST SERPL W P-5'-P-CCNC: 29 U/L (ref 13–39)
BACTERIA UR QL AUTO: NORMAL /HPF
BASOPHILS # BLD AUTO: 0.05 THOUSANDS/ÂΜL (ref 0–0.1)
BASOPHILS NFR BLD AUTO: 1 % (ref 0–1)
BILIRUB DIRECT SERPL-MCNC: 0.24 MG/DL (ref 0–0.2)
BILIRUB SERPL-MCNC: 1.38 MG/DL (ref 0.2–1)
BILIRUB UR QL STRIP: NEGATIVE
BUN SERPL-MCNC: 13 MG/DL (ref 5–25)
CALCIUM SERPL-MCNC: 9.1 MG/DL (ref 8.4–10.2)
CHLORIDE SERPL-SCNC: 102 MMOL/L (ref 96–108)
CHOLEST SERPL-MCNC: 147 MG/DL
CLARITY UR: CLEAR
CO2 SERPL-SCNC: 27 MMOL/L (ref 21–32)
COLOR UR: YELLOW
CREAT SERPL-MCNC: 0.9 MG/DL (ref 0.6–1.3)
EOSINOPHIL # BLD AUTO: 0.05 THOUSAND/ÂΜL (ref 0–0.61)
EOSINOPHIL NFR BLD AUTO: 1 % (ref 0–6)
ERYTHROCYTE [DISTWIDTH] IN BLOOD BY AUTOMATED COUNT: 11.6 % (ref 11.6–15.1)
EST. AVERAGE GLUCOSE BLD GHB EST-MCNC: 123 MG/DL
GFR SERPL CREATININE-BSD FRML MDRD: 93 ML/MIN/1.73SQ M
GLUCOSE P FAST SERPL-MCNC: 94 MG/DL (ref 65–99)
GLUCOSE UR STRIP-MCNC: NEGATIVE MG/DL
HBA1C MFR BLD: 5.9 %
HCT VFR BLD AUTO: 46.2 % (ref 36.5–49.3)
HDLC SERPL-MCNC: 33 MG/DL
HGB BLD-MCNC: 16.6 G/DL (ref 12–17)
HGB UR QL STRIP.AUTO: NEGATIVE
IMM GRANULOCYTES # BLD AUTO: 0.05 THOUSAND/UL (ref 0–0.2)
IMM GRANULOCYTES NFR BLD AUTO: 1 % (ref 0–2)
KETONES UR STRIP-MCNC: NEGATIVE MG/DL
LDLC SERPL CALC-MCNC: 83 MG/DL (ref 0–100)
LEUKOCYTE ESTERASE UR QL STRIP: NEGATIVE
LYMPHOCYTES # BLD AUTO: 2.16 THOUSANDS/ÂΜL (ref 0.6–4.47)
LYMPHOCYTES NFR BLD AUTO: 26 % (ref 14–44)
MCH RBC QN AUTO: 31.4 PG (ref 26.8–34.3)
MCHC RBC AUTO-ENTMCNC: 35.9 G/DL (ref 31.4–37.4)
MCV RBC AUTO: 88 FL (ref 82–98)
MONOCYTES # BLD AUTO: 0.54 THOUSAND/ÂΜL (ref 0.17–1.22)
MONOCYTES NFR BLD AUTO: 7 % (ref 4–12)
NEUTROPHILS # BLD AUTO: 5.49 THOUSANDS/ÂΜL (ref 1.85–7.62)
NEUTS SEG NFR BLD AUTO: 64 % (ref 43–75)
NITRITE UR QL STRIP: NEGATIVE
NON-SQ EPI CELLS URNS QL MICRO: NORMAL /HPF
NRBC BLD AUTO-RTO: 0 /100 WBCS
PH UR STRIP.AUTO: 7 [PH]
PLATELET # BLD AUTO: 171 THOUSANDS/UL (ref 149–390)
PMV BLD AUTO: 13.3 FL (ref 8.9–12.7)
POTASSIUM SERPL-SCNC: 3.9 MMOL/L (ref 3.5–5.3)
PROT SERPL-MCNC: 7.5 G/DL (ref 6.4–8.4)
PROT UR STRIP-MCNC: NEGATIVE MG/DL
RBC # BLD AUTO: 5.28 MILLION/UL (ref 3.88–5.62)
RBC #/AREA URNS AUTO: NORMAL /HPF
SODIUM SERPL-SCNC: 137 MMOL/L (ref 135–147)
SP GR UR STRIP.AUTO: 1.02 (ref 1–1.03)
TRIGL SERPL-MCNC: 154 MG/DL
UROBILINOGEN UR QL STRIP.AUTO: 1 E.U./DL
WBC # BLD AUTO: 8.34 THOUSAND/UL (ref 4.31–10.16)
WBC #/AREA URNS AUTO: NORMAL /HPF

## 2024-02-14 PROCEDURE — 85025 COMPLETE CBC W/AUTO DIFF WBC: CPT

## 2024-02-14 PROCEDURE — 82248 BILIRUBIN DIRECT: CPT

## 2024-02-14 PROCEDURE — 80061 LIPID PANEL: CPT

## 2024-02-14 PROCEDURE — 36415 COLL VENOUS BLD VENIPUNCTURE: CPT

## 2024-02-14 PROCEDURE — 83036 HEMOGLOBIN GLYCOSYLATED A1C: CPT

## 2024-02-14 PROCEDURE — 80053 COMPREHEN METABOLIC PANEL: CPT

## 2024-02-14 PROCEDURE — 81001 URINALYSIS AUTO W/SCOPE: CPT

## 2024-02-15 ENCOUNTER — TELEPHONE (OUTPATIENT)
Dept: WOUND CARE | Facility: HOSPITAL | Age: 59
End: 2024-02-15

## 2024-02-15 DIAGNOSIS — B35.1 ONYCHOMYCOSIS: Primary | ICD-10-CM

## 2024-02-15 RX ORDER — TERBINAFINE HYDROCHLORIDE 250 MG/1
250 TABLET ORAL DAILY
Qty: 84 TABLET | Refills: 0 | Status: SHIPPED | OUTPATIENT
Start: 2024-02-15 | End: 2024-05-09

## 2024-02-21 PROBLEM — Z13.6 SCREENING FOR CARDIOVASCULAR CONDITION: Status: RESOLVED | Noted: 2024-01-29 | Resolved: 2024-02-21

## 2024-02-25 ENCOUNTER — ANESTHESIA (OUTPATIENT)
Dept: ANESTHESIOLOGY | Facility: HOSPITAL | Age: 59
End: 2024-02-25

## 2024-02-25 ENCOUNTER — ANESTHESIA EVENT (OUTPATIENT)
Dept: ANESTHESIOLOGY | Facility: HOSPITAL | Age: 59
End: 2024-02-25

## 2024-03-04 ENCOUNTER — TELEPHONE (OUTPATIENT)
Dept: GASTROENTEROLOGY | Facility: AMBULARY SURGERY CENTER | Age: 59
End: 2024-03-04

## 2024-03-08 ENCOUNTER — TELEPHONE (OUTPATIENT)
Age: 59
End: 2024-03-08

## 2024-03-08 DIAGNOSIS — Z86.010 HISTORY OF COLON POLYPS: Primary | ICD-10-CM

## 2024-03-08 RX ORDER — SODIUM PICOSULFATE, MAGNESIUM OXIDE, AND ANHYDROUS CITRIC ACID 12; 3.5; 1 G/175ML; G/175ML; MG/175ML
LIQUID ORAL
Qty: 350 ML | Refills: 0 | Status: SHIPPED | OUTPATIENT
Start: 2024-03-08

## 2024-03-08 NOTE — TELEPHONE ENCOUNTER
Patients GI provider:       Number to return call: ( [phone]    Reason for call: Pt calling pt need   Clenpiq oral solution reorded pt states not at pharm please advise    Scheduled procedure/appointment date if applicable: Apt/procedure

## 2024-03-08 NOTE — TELEPHONE ENCOUNTER
Clenpiq Medication was ordered at his last OV with JULI Batres on 01/30/2024 can this please be resent?    Thank you!

## 2024-03-11 ENCOUNTER — ANESTHESIA EVENT (OUTPATIENT)
Dept: GASTROENTEROLOGY | Facility: AMBULARY SURGERY CENTER | Age: 59
End: 2024-03-11

## 2024-03-11 ENCOUNTER — ANESTHESIA (OUTPATIENT)
Dept: GASTROENTEROLOGY | Facility: AMBULARY SURGERY CENTER | Age: 59
End: 2024-03-11

## 2024-03-11 ENCOUNTER — HOSPITAL ENCOUNTER (OUTPATIENT)
Dept: GASTROENTEROLOGY | Facility: AMBULARY SURGERY CENTER | Age: 59
Setting detail: OUTPATIENT SURGERY
Discharge: HOME | End: 2024-03-11
Attending: INTERNAL MEDICINE | Admitting: INTERNAL MEDICINE
Payer: COMMERCIAL

## 2024-03-11 VITALS
OXYGEN SATURATION: 97 % | TEMPERATURE: 97.7 F | DIASTOLIC BLOOD PRESSURE: 74 MMHG | HEART RATE: 67 BPM | SYSTOLIC BLOOD PRESSURE: 121 MMHG | RESPIRATION RATE: 18 BRPM

## 2024-03-11 DIAGNOSIS — R13.19 ESOPHAGEAL DYSPHAGIA: ICD-10-CM

## 2024-03-11 DIAGNOSIS — Z86.010 HISTORY OF COLON POLYPS: ICD-10-CM

## 2024-03-11 DIAGNOSIS — K21.9 GASTROESOPHAGEAL REFLUX DISEASE, UNSPECIFIED WHETHER ESOPHAGITIS PRESENT: ICD-10-CM

## 2024-03-11 PROBLEM — E66.811 CLASS 1 OBESITY IN ADULT: Status: ACTIVE | Noted: 2020-03-31

## 2024-03-11 PROBLEM — E66.9 CLASS 1 OBESITY IN ADULT: Status: ACTIVE | Noted: 2020-03-31

## 2024-03-11 PROCEDURE — 43239 EGD BIOPSY SINGLE/MULTIPLE: CPT | Performed by: INTERNAL MEDICINE

## 2024-03-11 PROCEDURE — 88305 TISSUE EXAM BY PATHOLOGIST: CPT | Performed by: STUDENT IN AN ORGANIZED HEALTH CARE EDUCATION/TRAINING PROGRAM

## 2024-03-11 PROCEDURE — 45385 COLONOSCOPY W/LESION REMOVAL: CPT | Performed by: INTERNAL MEDICINE

## 2024-03-11 RX ORDER — LIDOCAINE HYDROCHLORIDE 20 MG/ML
INJECTION, SOLUTION EPIDURAL; INFILTRATION; INTRACAUDAL; PERINEURAL AS NEEDED
Status: DISCONTINUED | OUTPATIENT
Start: 2024-03-11 | End: 2024-03-11

## 2024-03-11 RX ORDER — PROPOFOL 10 MG/ML
INJECTION, EMULSION INTRAVENOUS CONTINUOUS PRN
Status: DISCONTINUED | OUTPATIENT
Start: 2024-03-11 | End: 2024-03-11

## 2024-03-11 RX ORDER — LIDOCAINE HYDROCHLORIDE 10 MG/ML
0.5 INJECTION, SOLUTION EPIDURAL; INFILTRATION; INTRACAUDAL; PERINEURAL ONCE AS NEEDED
Status: CANCELLED | OUTPATIENT
Start: 2024-03-11

## 2024-03-11 RX ORDER — PROPOFOL 10 MG/ML
INJECTION, EMULSION INTRAVENOUS AS NEEDED
Status: DISCONTINUED | OUTPATIENT
Start: 2024-03-11 | End: 2024-03-11

## 2024-03-11 RX ORDER — SODIUM CHLORIDE, SODIUM LACTATE, POTASSIUM CHLORIDE, CALCIUM CHLORIDE 600; 310; 30; 20 MG/100ML; MG/100ML; MG/100ML; MG/100ML
INJECTION, SOLUTION INTRAVENOUS CONTINUOUS PRN
Status: DISCONTINUED | OUTPATIENT
Start: 2024-03-11 | End: 2024-03-11

## 2024-03-11 RX ORDER — SODIUM CHLORIDE, SODIUM LACTATE, POTASSIUM CHLORIDE, CALCIUM CHLORIDE 600; 310; 30; 20 MG/100ML; MG/100ML; MG/100ML; MG/100ML
125 INJECTION, SOLUTION INTRAVENOUS CONTINUOUS
Status: CANCELLED | OUTPATIENT
Start: 2024-03-11

## 2024-03-11 RX ADMIN — PROPOFOL 50 MG: 10 INJECTION, EMULSION INTRAVENOUS at 12:32

## 2024-03-11 RX ADMIN — PROPOFOL 50 MG: 10 INJECTION, EMULSION INTRAVENOUS at 12:30

## 2024-03-11 RX ADMIN — PROPOFOL 140 MCG/KG/MIN: 10 INJECTION, EMULSION INTRAVENOUS at 12:35

## 2024-03-11 RX ADMIN — SODIUM CHLORIDE, SODIUM LACTATE, POTASSIUM CHLORIDE, AND CALCIUM CHLORIDE: .6; .31; .03; .02 INJECTION, SOLUTION INTRAVENOUS at 12:22

## 2024-03-11 RX ADMIN — PROPOFOL 100 MG: 10 INJECTION, EMULSION INTRAVENOUS at 12:28

## 2024-03-11 RX ADMIN — PROPOFOL 50 MG: 10 INJECTION, EMULSION INTRAVENOUS at 12:31

## 2024-03-11 RX ADMIN — PROPOFOL 50 MG: 10 INJECTION, EMULSION INTRAVENOUS at 12:34

## 2024-03-11 RX ADMIN — PROPOFOL 50 MG: 10 INJECTION, EMULSION INTRAVENOUS at 12:29

## 2024-03-11 RX ADMIN — LIDOCAINE HYDROCHLORIDE 100 MG: 20 INJECTION, SOLUTION EPIDURAL; INFILTRATION; INTRACAUDAL; PERINEURAL at 12:28

## 2024-03-11 NOTE — ANESTHESIA PREPROCEDURE EVALUATION
Procedure:  COLONOSCOPY  EGD    Relevant Problems   ANESTHESIA (within normal limits)      CARDIO   (+) Hyperlipidemia      GI/HEPATIC   (+) Gastroesophageal reflux disease without esophagitis   (+) NAFLD (nonalcoholic fatty liver disease)   (+) Steatohepatitis      PULMONARY   (+) MARIO (obstructive sleep apnea)   (+) Obstructive sleep apnea      Other   (+) Class 1 obesity in adult   (+) History of gastroesophageal reflux (GERD)   (+) Prediabetes        Physical Exam    Airway    Mallampati score: II  TM Distance: >3 FB  Neck ROM: full     Dental   No notable dental hx     Cardiovascular      Pulmonary      Other Findings        Anesthesia Plan  ASA Score- 2     Anesthesia Type- IV sedation with anesthesia with ASA Monitors.         Additional Monitors:     Airway Plan:            Plan Factors-Exercise tolerance (METS): >4 METS.    Chart reviewed. EKG reviewed.  Existing labs reviewed. Patient summary reviewed.    Patient is not a current smoker.              Induction-     Postoperative Plan-     Informed Consent- Anesthetic plan and risks discussed with patient.  I personally reviewed this patient with the CRNA. Discussed and agreed on the Anesthesia Plan with the CRNA..

## 2024-03-11 NOTE — H&P
History and Physical - [facility]  Ish Huggins 58 y.o. male MRN: [id number]    HPI: Ish Huggins is a 58 y.o. year old male who presents for evaluation of GERD/dysphagia and for history of colon polyps.      Review of Systems    Historical Information   Past Medical History:   Diagnosis Date    Abnormal weight gain     Last assessed 3/24/2014    Allergic     Anxiety     Gastroesophageal reflux disease without esophagitis 3/2/2020    GERD (gastroesophageal reflux disease)     Hemorrhoids     History of gastroesophageal reflux (GERD)     Hyperlipidemia     Last assessed 3/24/2014     Sleep apnea     insurance not covering     Past Surgical History:   Procedure Laterality Date    COLONOSCOPY      IR BIOPSY LIVER RANDOM  5/17/2021    ND PALATOPHARYNGOPLASTY N/A 3/13/2020    Procedure: UVULOPALATOPHARYNGOPLASTY (UPPP);  Surgeon: Antonio Olivia MD;  Location:  MAIN OR;  Service: ENT    TONSILLECTOMY  03/16/2020    UVULECTOMY  03/16/2020     Social History   Social History     Substance and Sexual Activity   Alcohol Use No     Social History     Substance and Sexual Activity   Drug Use No     Social History     Tobacco Use   Smoking Status Never   Smokeless Tobacco Never     Family History   Problem Relation Age of Onset    Hypertension Mother     No Known Problems Father     COPD Maternal Uncle     No Known Problems Sister     No Known Problems Brother        Meds/Allergies     (Not in a hospital admission)      No Known Allergies    Objective     /78   Pulse 67   Temp 97.7 °F (36.5 °C) (Skin)   Resp 18   SpO2 94%       PHYSICAL EXAM    Gen: NAD  CV: RRR  CHEST: Clear  ABD: soft, NT/ND  EXT: no edema  Neuro: AAO      ASSESSMENT/PLAN:  This is a 58 y.o. year old male here for evaluation of history of colon polyps, GERD and dysphagia symptoms.    PLAN:   Procedure: EGD and colonoscopy.

## 2024-03-11 NOTE — ANESTHESIA POSTPROCEDURE EVALUATION
Post-Op Assessment Note    CV Status:  Stable  Pain Score: 0    Pain management: adequate       Mental Status:  Sleepy   Hydration Status:  Stable   PONV Controlled:  None   Airway Patency:  Patent     Post Op Vitals Reviewed: Yes    No anethesia notable event occurred.    Staff: Anesthesiologist, CRNA               BP   120/67   Temp      Pulse  70   Resp   14   SpO2   98

## 2024-03-13 PROCEDURE — 88305 TISSUE EXAM BY PATHOLOGIST: CPT | Performed by: STUDENT IN AN ORGANIZED HEALTH CARE EDUCATION/TRAINING PROGRAM

## 2024-04-02 ENCOUNTER — TELEPHONE (OUTPATIENT)
Age: 59
End: 2024-04-02

## 2024-04-02 ENCOUNTER — OFFICE VISIT (OUTPATIENT)
Dept: INTERNAL MEDICINE CLINIC | Facility: CLINIC | Age: 59
End: 2024-04-02
Payer: COMMERCIAL

## 2024-04-02 VITALS
HEIGHT: 67 IN | BODY MASS INDEX: 31.23 KG/M2 | HEART RATE: 82 BPM | SYSTOLIC BLOOD PRESSURE: 136 MMHG | OXYGEN SATURATION: 99 % | WEIGHT: 199 LBS | DIASTOLIC BLOOD PRESSURE: 78 MMHG

## 2024-04-02 DIAGNOSIS — J31.0 CHRONIC RHINITIS: ICD-10-CM

## 2024-04-02 DIAGNOSIS — R73.03 PREDIABETES: ICD-10-CM

## 2024-04-02 DIAGNOSIS — H60.313 ACUTE DIFFUSE OTITIS EXTERNA OF BOTH EARS: Primary | ICD-10-CM

## 2024-04-02 DIAGNOSIS — E78.2 MIXED HYPERLIPIDEMIA: ICD-10-CM

## 2024-04-02 DIAGNOSIS — K21.9 GASTROESOPHAGEAL REFLUX DISEASE WITHOUT ESOPHAGITIS: ICD-10-CM

## 2024-04-02 PROCEDURE — 99214 OFFICE O/P EST MOD 30 MIN: CPT | Performed by: INTERNAL MEDICINE

## 2024-04-02 RX ORDER — FEXOFENADINE HCL 60 MG/1
60 TABLET, FILM COATED ORAL DAILY
Qty: 180 TABLET | Refills: 1 | Status: SHIPPED | OUTPATIENT
Start: 2024-04-02

## 2024-04-02 RX ORDER — FLUTICASONE PROPIONATE 50 MCG
2 SPRAY, SUSPENSION (ML) NASAL DAILY
Qty: 18.2 ML | Refills: 0 | Status: SHIPPED | OUTPATIENT
Start: 2024-04-02

## 2024-04-02 RX ORDER — FEXOFENADINE HCL AND PSEUDOEPHEDRINE HCI 60; 120 MG/1; MG/1
1 TABLET, EXTENDED RELEASE ORAL EVERY 12 HOURS
Qty: 30 TABLET | Refills: 3 | Status: SHIPPED | OUTPATIENT
Start: 2024-04-02 | End: 2024-04-02

## 2024-04-02 RX ORDER — NEOMYCIN SULFATE, POLYMYXIN B SULFATE AND HYDROCORTISONE 10; 3.5; 1 MG/ML; MG/ML; [USP'U]/ML
SUSPENSION/ DROPS AURICULAR (OTIC)
Qty: 10 ML | Refills: 0 | Status: SHIPPED | OUTPATIENT
Start: 2024-04-02

## 2024-04-02 NOTE — ASSESSMENT & PLAN NOTE
Lab Results   Component Value Date    HGBA1C 5.9 (H) 02/14/2024   Reviewed borderline advised diabetic diet will monitor closely

## 2024-04-02 NOTE — ASSESSMENT & PLAN NOTE
Some pain both ear and on exam external canal red swollen tender with self likely discharge    Will treat with Cortisporin ear suspension 3 drops 3 times a day for 1 week

## 2024-04-02 NOTE — PROGRESS NOTES
Dr. Iraheta's Office Visit Note  24     Ish HALL Joseluis 58 y.o. male MRN: [id number]  : 1965    Assessment:     1. Acute diffuse otitis externa of both ears  Assessment & Plan:  Some pain both ear and on exam external canal red swollen tender with self likely discharge    Will treat with Cortisporin ear suspension 3 drops 3 times a day for 1 week    Orders:  -     neomycin-polymyxin-hydrocortisone (CORTISPORIN) 0.35%-10,000 units/mL-1% otic suspension; 3 drops 3 times a day posterior for 1 week    2. Chronic rhinitis  Assessment & Plan:  Also complains of persistent dryness thick discharge will change Allegra-D to only plain Allegra 60 twice a day with continue Nasonex    Orders:  -     fluticasone (FLONASE) 50 mcg/act nasal spray; 2 sprays into each nostril daily  -     fexofenadine (ALLEGRA) 60 MG tablet; Take 1 tablet (60 mg total) by mouth daily    3. Gastroesophageal reflux disease without esophagitis  Assessment & Plan:  Evaluated by GI upper endoscopy done results reviewed    Agree continue manage medication follow-up    Prilosec 40 mg daily      4. Mixed hyperlipidemia  Assessment & Plan:  LDL controlled    Lab Results   Component Value Date    LDLCALC 83 2024      Lab Results   Component Value Date    ALT 46 2024    ALT 47 (H) 2019        Agree continue management medication as follows    Crestor 10 mg daily with low-fat low-cholesterol diet      5. Prediabetes  Assessment & Plan:  Lab Results   Component Value Date    HGBA1C 5.9 (H) 2024   Reviewed borderline advised diabetic diet will monitor closely            Discussion Summary and Plan:  Today's care plan and medications were reviewed with patient in detail and all their questions answered to their satisfaction.    No chief complaint on file.     Subjective:  Came in follow-up chronic medical condition listed visit diagnosis complaining of pain both years and dryness in the nose with thick discharge labs reviewed  patient seen by GI upper endoscopy done results reviewed on Prilosec symptoms controlled        The following portions of the patient's history were reviewed and updated as appropriate: allergies, current medications, past family history, past medical history, past social history, past surgical history and problem list.    Review of Systems   Constitutional:  Positive for activity change. Negative for appetite change, chills, diaphoresis, fatigue, fever and unexpected weight change.   HENT:  Positive for congestion and postnasal drip. Negative for dental problem, drooling, ear discharge, ear pain, facial swelling, hearing loss, mouth sores, nosebleeds, rhinorrhea, sinus pressure, sneezing, sore throat, tinnitus, trouble swallowing and voice change.    Eyes:  Negative for photophobia, pain, discharge, redness, itching and visual disturbance.   Respiratory:  Negative for apnea, cough, choking, chest tightness, shortness of breath, wheezing and stridor.    Cardiovascular:  Negative for chest pain, palpitations and leg swelling.   Gastrointestinal:  Negative for abdominal distention, abdominal pain, anal bleeding, blood in stool, constipation, diarrhea, nausea, rectal pain and vomiting.   Endocrine: Negative for cold intolerance, heat intolerance, polydipsia, polyphagia and polyuria.   Genitourinary:  Negative for decreased urine volume, difficulty urinating, dysuria, enuresis, flank pain, frequency, genital sores, hematuria and urgency.   Musculoskeletal:  Negative for arthralgias, back pain, gait problem, joint swelling, myalgias, neck pain and neck stiffness.   Skin:  Negative for color change, pallor, rash and wound.   Allergic/Immunologic: Negative.  Negative for environmental allergies, food allergies and immunocompromised state.   Neurological:  Negative for dizziness, tremors, seizures, syncope, facial asymmetry, speech difficulty, weakness, light-headedness, numbness and headaches.   Psychiatric/Behavioral:   Negative for agitation, behavioral problems, confusion, decreased concentration, dysphoric mood, hallucinations, self-injury, sleep disturbance and suicidal ideas. The patient is not nervous/anxious and is not hyperactive.          Historical Information   Patient Active Problem List   Diagnosis   • Daytime hypersomnolence   • MARIO (obstructive sleep apnea)   • Obstructive sleep apnea   • History of gastroesophageal reflux (GERD)   • Hyperlipidemia   • Chronic rhinitis   • Gastroesophageal reflux disease without esophagitis   • Steatohepatitis   • Class 1 obesity in adult   • Tubular adenoma of colon   • NAFLD (nonalcoholic fatty liver disease)   • Trichomonas infection   • Candida albicans infection   • Onychomycosis   • Prediabetes   • Acute diffuse otitis externa of both ears     Past Medical History:   Diagnosis Date   • Abnormal weight gain     Last assessed 3/24/2014   • Allergic    • Anxiety    • Gastroesophageal reflux disease without esophagitis 3/2/2020   • GERD (gastroesophageal reflux disease)    • Hemorrhoids    • History of gastroesophageal reflux (GERD)    • Hyperlipidemia     Last assessed 3/24/2014    • Sleep apnea     insurance not covering     Past Surgical History:   Procedure Laterality Date   • COLONOSCOPY     • IR BIOPSY LIVER RANDOM  5/17/2021   • VA PALATOPHARYNGOPLASTY N/A 3/13/2020    Procedure: UVULOPALATOPHARYNGOPLASTY (UPPP);  Surgeon: Antonio Olivia MD;  Location: [facility];  Service: ENT   • TONSILLECTOMY  03/16/2020   • UVULECTOMY  03/16/2020     Social History     Substance and Sexual Activity   Alcohol Use No     Social History     Substance and Sexual Activity   Drug Use No     Social History     Tobacco Use   Smoking Status Never   Smokeless Tobacco Never     Family History   Problem Relation Age of Onset   • Hypertension Mother    • No Known Problems Father    • COPD Maternal Uncle    • No Known Problems Sister    • No Known Problems Brother      Health Maintenance Due   Topic   •  "DTaP,Tdap,and Td Vaccines (1 - Tdap)   • Zoster Vaccine (1 of 2)   • Annual Physical    • Influenza Vaccine (1)   • COVID-19 Vaccine (4 - 2023-24 season)      Meds/Allergies       Current Outpatient Medications:   •  albuterol (ProAir HFA) 90 mcg/act inhaler, Inhale 2 puffs every 6 (six) hours as needed (cough, trouble breathing), Disp: 8.5 g, Rfl: 0  •  fexofenadine (ALLEGRA) 60 MG tablet, Take 1 tablet (60 mg total) by mouth daily, Disp: 180 tablet, Rfl: 1  •  fluticasone (FLONASE) 50 mcg/act nasal spray, 2 sprays into each nostril daily, Disp: 18.2 mL, Rfl: 0  •  mupirocin (BACTROBAN) 2 % ointment, Apply topically 3 (three) times a day, Disp: 22 g, Rfl: 2  •  neomycin-polymyxin-hydrocortisone (CORTISPORIN) 0.35%-10,000 units/mL-1% otic suspension, 3 drops 3 times a day posterior for 1 week, Disp: 10 mL, Rfl: 0  •  olopatadine (PATANOL) 0.1 % ophthalmic solution, Administer 1 drop to the right eye 2 (two) times a day, Disp: 5 mL, Rfl: 0  •  omeprazole (PriLOSEC) 40 MG capsule, Take 1 capsule (40 mg total) by mouth daily, Disp: 30 capsule, Rfl: 3  •  rosuvastatin (CRESTOR) 10 MG tablet, Take 1 tablet (10 mg total) by mouth daily, Disp: 90 tablet, Rfl: 3  •  tamsulosin (FLOMAX) 0.4 mg, Take 0.8 mg by mouth daily, Disp: , Rfl:   •  terbinafine (LamISIL) 250 mg tablet, Take 1 tablet (250 mg total) by mouth daily, Disp: 84 tablet, Rfl: 0      Objective:    Vitals:   /78   Pulse 82   Ht 5' 7\" (1.702 m)   Wt 90.3 kg (199 lb)   SpO2 99%   BMI 31.17 kg/m²   Body mass index is 31.17 kg/m².  Vitals:    04/02/24 1010   Weight: 90.3 kg (199 lb)       Physical Exam  Constitutional:       General: He is not in acute distress.     Appearance: He is well-developed. He is not ill-appearing, toxic-appearing or diaphoretic.   HENT:      Head: Normocephalic and atraumatic.      Right Ear: External ear normal.      Left Ear: External ear normal.      Nose: Congestion and rhinorrhea present.      Mouth/Throat:      Pharynx: " No oropharyngeal exudate.   Eyes:      General: Lids are normal. Lids are everted, no foreign bodies appreciated. No scleral icterus.        Right eye: No discharge.         Left eye: No discharge.      Conjunctiva/sclera: Conjunctivae normal.      Pupils: Pupils are equal, round, and reactive to light.   Neck:      Thyroid: No thyromegaly.      Vascular: Normal carotid pulses. No carotid bruit, hepatojugular reflux or JVD.      Trachea: No tracheal tenderness or tracheal deviation.   Cardiovascular:      Rate and Rhythm: Normal rate and regular rhythm.      Pulses: Normal pulses.      Heart sounds: Normal heart sounds. No murmur heard.     No friction rub. No gallop.   Pulmonary:      Effort: Pulmonary effort is normal. No respiratory distress.      Breath sounds: Normal breath sounds. No stridor. No wheezing or rales.   Chest:      Chest wall: No tenderness.   Abdominal:      General: Bowel sounds are normal. There is no distension.      Palpations: Abdomen is soft. There is no mass.      Tenderness: There is no abdominal tenderness. There is no guarding or rebound.   Musculoskeletal:         General: No tenderness or deformity. Normal range of motion.      Cervical back: Normal range of motion and neck supple. No edema, erythema or rigidity. No spinous process tenderness or muscular tenderness. Normal range of motion.   Lymphadenopathy:      Head:      Right side of head: No submental, submandibular, tonsillar, preauricular or posterior auricular adenopathy.      Left side of head: No submental, submandibular, tonsillar, preauricular, posterior auricular or occipital adenopathy.      Cervical: No cervical adenopathy.      Right cervical: No superficial, deep or posterior cervical adenopathy.     Left cervical: No superficial, deep or posterior cervical adenopathy.      Upper Body:      Right upper body: No pectoral adenopathy.      Left upper body: No pectoral adenopathy.   Skin:     General: Skin is warm and  dry.      Coloration: Skin is not pale.      Findings: No erythema or rash.   Neurological:      Mental Status: He is alert and oriented to person, place, and time.      Cranial Nerves: No cranial nerve deficit.      Sensory: No sensory deficit.      Motor: No tremor, abnormal muscle tone or seizure activity.      Coordination: Coordination normal.      Gait: Gait normal.      Deep Tendon Reflexes: Reflexes are normal and symmetric. Reflexes normal.   Psychiatric:         Behavior: Behavior normal.         Thought Content: Thought content normal.         Judgment: Judgment normal.         Lab Review   Hospital Outpatient Visit on 03/11/2024   Component Date Value Ref Range Status   • Case Report 03/11/2024    Final                    Value:Surgical Pathology Report                         Case: [id number]                                  Authorizing Provider:  Kena Valero MD       Collected:           03/11/2024 1231              Ordering Location:     [facility]   Received:            03/11/2024 1432                                     Center                                                                       Pathologist:           Hadley Schuster MD                                                            Specimens:   A) - Duodenum, Duodenum Biopsy Rule Out Celiac Disease                                              B) - Stomach, Gastric Biopsy Rule Out H.Pylori                                                      C) - Large Intestine, Left/Descending Colon, Descending Polyp cold snare                  • Final Diagnosis 03/11/2024    Final                    Value:This result contains rich text formatting which cannot be displayed here.   • Additional Information 03/11/2024    Final                    Value:This result contains rich text formatting which cannot be displayed here.   • Synoptic Checklist 03/11/2024    Final                    Value:                            COLON/RECTUM  POLYP FORM - GI - C                                                                                     :    Adenoma(s)     • Gross Description 03/11/2024    Final                    Value:This result contains rich text formatting which cannot be displayed here.   • Clinical Information 03/11/2024    Final                    Value:INDICATION:  Esophageal dysphagia, Gastroesophageal reflux disease, unspecified whether esophagitis present   FINDINGS:  · Regular Z-line 40 cm from the incisors  · Mild, patchy erythematous mucosa in the prepyloric region; performed cold forceps biopsy to rule out H. pylori. Retroflexed view is unremarkable, pylorus is patent.  · The duodenal bulb, 1st part of the duodenum and 2nd part of the duodenum appeared normal. Performed random biopsy using biopsy forceps to rule out celiac disease.  · One adenomatous-appearing, semi-pedunculated polyp measuring smaller than 5 mm in the descending colon; performed cold snare with complete en bloc removal and retrieved specimen  · Internal small hemorrhoids  · Medium diverticula of moderate severity in the sigmoid colon and rectosigmoid   Appointment on 02/14/2024   Component Date Value Ref Range Status   • WBC 02/14/2024 8.34  4.31 - 10.16 Thousand/uL Final   • RBC 02/14/2024 5.28  3.88 - 5.62 Million/uL Final   • Hemoglobin 02/14/2024 16.6  12.0 - 17.0 g/dL Final   • Hematocrit 02/14/2024 46.2  36.5 - 49.3 % Final   • MCV 02/14/2024 88  82 - 98 fL Final   • MCH 02/14/2024 31.4  26.8 - 34.3 pg Final   • MCHC 02/14/2024 35.9  31.4 - 37.4 g/dL Final   • RDW 02/14/2024 11.6  11.6 - 15.1 % Final   • MPV 02/14/2024 13.3 (H)  8.9 - 12.7 fL Final   • Platelets 02/14/2024 171  149 - 390 Thousands/uL Final   • nRBC 02/14/2024 0  /100 WBCs Final   • Neutrophils Relative 02/14/2024 64  43 - 75 % Final   • Immature Grans % 02/14/2024 1  0 - 2 % Final   • Lymphocytes Relative 02/14/2024 26  14 - 44 % Final   • Monocytes Relative 02/14/2024 7  4 - 12 % Final   •  Eosinophils Relative 02/14/2024 1  0 - 6 % Final   • Basophils Relative 02/14/2024 1  0 - 1 % Final   • Neutrophils Absolute 02/14/2024 5.49  1.85 - 7.62 Thousands/µL Final   • Absolute Immature Grans 02/14/2024 0.05  0.00 - 0.20 Thousand/uL Final   • Absolute Lymphocytes 02/14/2024 2.16  0.60 - 4.47 Thousands/µL Final   • Absolute Monocytes 02/14/2024 0.54  0.17 - 1.22 Thousand/µL Final   • Eosinophils Absolute 02/14/2024 0.05  0.00 - 0.61 Thousand/µL Final   • Basophils Absolute 02/14/2024 0.05  0.00 - 0.10 Thousands/µL Final   • Sodium 02/14/2024 137  135 - 147 mmol/L Final   • Potassium 02/14/2024 3.9  3.5 - 5.3 mmol/L Final   • Chloride 02/14/2024 102  96 - 108 mmol/L Final   • CO2 02/14/2024 27  21 - 32 mmol/L Final   • ANION GAP 02/14/2024 8  mmol/L Final   • BUN 02/14/2024 13  5 - 25 mg/dL Final   • Creatinine 02/14/2024 0.90  0.60 - 1.30 mg/dL Final    Standardized to IDMS reference method   • Glucose, Fasting 02/14/2024 94  65 - 99 mg/dL Final   • Calcium 02/14/2024 9.1  8.4 - 10.2 mg/dL Final   • AST 02/14/2024 29  13 - 39 U/L Final   • ALT 02/14/2024 46  7 - 52 U/L Final    Specimen collection should occur prior to Sulfasalazine administration due to the potential for falsely depressed results.    • Alkaline Phosphatase 02/14/2024 68  34 - 104 U/L Final   • Total Protein 02/14/2024 7.5  6.4 - 8.4 g/dL Final   • Albumin 02/14/2024 4.6  3.5 - 5.0 g/dL Final   • Total Bilirubin 02/14/2024 1.38 (H)  0.20 - 1.00 mg/dL Final    Use of this assay is not recommended for patients undergoing treatment with eltrombopag due to the potential for falsely elevated results.  N-acetyl-p-benzoquinone imine (metabolite of Acetaminophen) will generate erroneously low results in samples for patients that have taken an overdose of Acetaminophen.   • eGFR 02/14/2024 93  ml/min/1.73sq m Final   • Hemoglobin A1C 02/14/2024 5.9 (H)  Normal 4.0-5.6%; PreDiabetic 5.7-6.4%; Diabetic >=6.5%; Glycemic control for adults with diabetes  <7.0% % Final   • EAG 02/14/2024 123  mg/dl Final   • Cholesterol 02/14/2024 147  See Comment mg/dL Final    Cholesterol:         Pediatric <18 Years        Desirable          <170 mg/dL      Borderline High    170-199 mg/dL      High               >=200 mg/dL        Adult >=18 Years            Desirable         <200 mg/dL      Borderline High   200-239 mg/dL      High              >239 mg/dL     • Triglycerides 02/14/2024 154 (H)  See Comment mg/dL Final    Triglyceride:     0-9Y            <75mg/dL     10Y-17Y         <90 mg/dL       >=18Y     Normal          <150 mg/dL     Borderline High 150-199 mg/dL     High            200-499 mg/dL        Very High       >499 mg/dL    Specimen collection should occur prior to Metamizole administration due to the potential for falsely depressed results.   • HDL, Direct 02/14/2024 33 (L)  >=40 mg/dL Final   • LDL Calculated 02/14/2024 83  0 - 100 mg/dL Final    LDL Cholesterol:     Optimal           <100 mg/dl     Near Optimal      100-129 mg/dl     Above Optimal       Borderline High 130-159 mg/dl       High            160-189 mg/dl       Very High       >189 mg/dl         This screening LDL is a calculated result.   It does not have the accuracy of the Direct Measured LDL in the monitoring of patients with hyperlipidemia and/or statin therapy.   Direct Measure LDL (PIR889) must be ordered separately in these patients.   • Color, UA 02/14/2024 Yellow   Final   • Clarity, UA 02/14/2024 Clear   Final   • Specific Gravity, UA 02/14/2024 1.020  1.000 - 1.030 Final   • pH, UA 02/14/2024 7.0  5.0, 5.5, 6.0, 6.5, 7.0, 7.5, 8.0, 8.5, 9.0 Final   • Leukocytes, UA 02/14/2024 Negative  Negative Final   • Nitrite, UA 02/14/2024 Negative  Negative Final   • Protein, UA 02/14/2024 Negative  Negative mg/dl Final   • Glucose, UA 02/14/2024 Negative  Negative mg/dl Final   • Ketones, UA 02/14/2024 Negative  Negative mg/dl Final   • Urobilinogen, UA 02/14/2024 1.0  0.2, 1.0 E.U./dl E.U./dl  Final   • Bilirubin, UA 02/14/2024 Negative  Negative Final   • Occult Blood, UA 02/14/2024 Negative  Negative Final   • RBC, UA 02/14/2024 None Seen  None Seen, 2-4 /hpf Final   • WBC, UA 02/14/2024 None Seen  None Seen, 2-4, 5-60 /hpf Final   • Epithelial Cells 02/14/2024 None Seen  None Seen, Occasional /hpf Final   • Bacteria, UA 02/14/2024 Occasional  None Seen, Occasional /hpf Final   • Bilirubin, Direct 02/14/2024 0.24 (H)  0.00 - 0.20 mg/dL Final   Office Visit on 02/05/2024   Component Date Value Ref Range Status   • Case Report 02/05/2024    Final                    Value:Surgical Pathology Report                         Case: [id number]                                  Authorizing Provider:  Lamberto Lemos DPM         Collected:           02/05/2024 [facility]              Ordering Location:     [facility]        Received:            02/05/2024 [address] [place] [address]                                                                   Pathologist:           Zain Santacruz MD                                                           Specimen:    Nail, right big toe                                                                       • Final Diagnosis 02/05/2024    Final                    Value:This result contains rich text formatting which cannot be displayed here.   • Additional Information 02/05/2024    Final                    Value:This result contains rich text formatting which cannot be displayed here.   • Gross Description 02/05/2024    Final                    Value:This result contains rich text formatting which cannot be displayed here.   • Clinical Information 02/05/2024    Final                    Value:PAS Stain         Patient Instructions   Allergic Rhinitis   WHAT YOU NEED TO KNOW:   Allergic rhinitis, or hay fever, is swelling of the inside of your nose. The swelling is a reaction to allergens in the air. An allergen can be anything that causes an allergic  reaction. Allergies to weeds, grass, trees, or mold often cause seasonal allergic rhinitis. Indoor dust mites, cockroaches, pet dander, or mold can also cause allergic rhinitis.   DISCHARGE INSTRUCTIONS:   Call 911 for the following:   You have chest pain or shortness of breath.       Return to the emergency department if:   You have severe pain.    You cough up blood.    Contact your healthcare provider if:   You have a fever.     You have ear or sinus pain, or a headache.    Your symptoms get worse, even after treatment.     You have yellow, green, brown, or bloody mucus coming from your nose.     Your nose is bleeding or you have pain inside your nose.     You have trouble sleeping because of your symptoms.    You have questions or concerns about your condition or care.    Medicines:   Medicines  help decrease your symptoms and clear your stuffy nose.    Take your medicine as directed.  Contact your healthcare provider if you think your medicine is not helping or if you have side effects. Tell him of her if you are allergic to any medicine. Keep a list of the medicines, vitamins, and herbs you take. Include the amounts, and when and why you take them. Bring the list or the pill bottles to follow-up visits. Carry your medicine list with you in case of an emergency.    How to manage allergic rhinitis:  The best way to manage allergic rhinitis is to avoid allergens that can trigger your symptoms. Any of the following may help decrease your symptoms:  Rinse your nose and sinuses  with a salt water solution or use a salt water nasal spray. This will help thin the mucus in your nose and rinse away pollen and dirt. It will also help reduce swelling so you can breathe normally. Ask your healthcare provider how often to rinse your nose.    Reduce exposure to dust mites.  Wash sheets and towels in hot water every week. Cover your pillows and mattresses with allergen-free covers. Limit the number of stuffed animals and soft  "toys your child has. Wash your child's toys in hot water regularly. Vacuum weekly and use a vacuum  with an air filter. If possible, get rid of carpets and curtains. These collect dust and dust mites.     Reduce exposure to pollen.  Keep windows and doors closed in your house and car. Stay inside when air pollution or the pollen count is high. Run your air conditioner on recycle, and change air filters often. Shower and wash your hair before bed every night to rinse away pollen.    Reduce exposure to pet dander.  If possible, do not keep cats, dogs, birds, or other pets. If you do keep pets in your home, keep them out of bedrooms and carpeted rooms. Bathe them often.     Reduce exposure to mold.  Do not spend time in basements. Choose artificial plants instead of live plants. Keep your home's humidity at less than 45%. Do not have ponds or standing water in your home or yard.     Do not smoke.  Avoid others who smoke. Ask your healthcare provider for information if you currently smoke and need help to quit.    Follow up with your healthcare provider as directed:  You may need to see an allergist often to control your symptoms. Write down your questions so you remember to ask them during your visits.  © Copyright [employer] 2022 Information is for End User's use only and may not be sold, redistributed or otherwise used for commercial purposes. All illustrations and images included in CareNotes® are the copyrighted property of [employer] or [employer]  The above information is an  only. It is not intended as medical advice for individual conditions or treatments. Talk to your doctor, nurse or pharmacist before following any medical regimen to see if it is safe and effective for you.       Vasquez Iraheta MD        \"This note has been constructed using a voice recognition system.Therefore there may be syntax, spelling, and/or grammatical errors. Please call if you have any " "questions. \"  "

## 2024-04-02 NOTE — PATIENT INSTRUCTIONS
Allergic Rhinitis   WHAT YOU NEED TO KNOW:   Allergic rhinitis, or hay fever, is swelling of the inside of your nose. The swelling is a reaction to allergens in the air. An allergen can be anything that causes an allergic reaction. Allergies to weeds, grass, trees, or mold often cause seasonal allergic rhinitis. Indoor dust mites, cockroaches, pet dander, or mold can also cause allergic rhinitis.   DISCHARGE INSTRUCTIONS:   Call 911 for the following:   You have chest pain or shortness of breath.       Return to the emergency department if:   You have severe pain.    You cough up blood.    Contact your healthcare provider if:   You have a fever.     You have ear or sinus pain, or a headache.    Your symptoms get worse, even after treatment.     You have yellow, green, brown, or bloody mucus coming from your nose.     Your nose is bleeding or you have pain inside your nose.     You have trouble sleeping because of your symptoms.    You have questions or concerns about your condition or care.    Medicines:   Medicines  help decrease your symptoms and clear your stuffy nose.    Take your medicine as directed.  Contact your healthcare provider if you think your medicine is not helping or if you have side effects. Tell him of her if you are allergic to any medicine. Keep a list of the medicines, vitamins, and herbs you take. Include the amounts, and when and why you take them. Bring the list or the pill bottles to follow-up visits. Carry your medicine list with you in case of an emergency.    How to manage allergic rhinitis:  The best way to manage allergic rhinitis is to avoid allergens that can trigger your symptoms. Any of the following may help decrease your symptoms:  Rinse your nose and sinuses  with a salt water solution or use a salt water nasal spray. This will help thin the mucus in your nose and rinse away pollen and dirt. It will also help reduce swelling so you can breathe normally. Ask your healthcare  provider how often to rinse your nose.    Reduce exposure to dust mites.  Wash sheets and towels in hot water every week. Cover your pillows and mattresses with allergen-free covers. Limit the number of stuffed animals and soft toys your child has. Wash your child's toys in hot water regularly. Vacuum weekly and use a vacuum  with an air filter. If possible, get rid of carpets and curtains. These collect dust and dust mites.     Reduce exposure to pollen.  Keep windows and doors closed in your house and car. Stay inside when air pollution or the pollen count is high. Run your air conditioner on recycle, and change air filters often. Shower and wash your hair before bed every night to rinse away pollen.    Reduce exposure to pet dander.  If possible, do not keep cats, dogs, birds, or other pets. If you do keep pets in your home, keep them out of bedrooms and carpeted rooms. Bathe them often.     Reduce exposure to mold.  Do not spend time in basements. Choose artificial plants instead of live plants. Keep your home's humidity at less than 45%. Do not have ponds or standing water in your home or yard.     Do not smoke.  Avoid others who smoke. Ask your healthcare provider for information if you currently smoke and need help to quit.    Follow up with your healthcare provider as directed:  You may need to see an allergist often to control your symptoms. Write down your questions so you remember to ask them during your visits.  © Copyright [employer] 2022 Information is for End User's use only and may not be sold, redistributed or otherwise used for commercial purposes. All illustrations and images included in CareNotes® are the copyrighted property of [employer] or [employer]  The above information is an  only. It is not intended as medical advice for individual conditions or treatments. Talk to your doctor, nurse or pharmacist before following any medical regimen to see if it is  safe and effective for you.

## 2024-04-02 NOTE — ASSESSMENT & PLAN NOTE
Evaluated by GI upper endoscopy done results reviewed    Agree continue manage medication follow-up    Prilosec 40 mg daily

## 2024-04-02 NOTE — TELEPHONE ENCOUNTER
[facility] called to get clarification the the instruction for the Cortisporin otic drops. It says Sig: 3 drops 3 times a day posterior for 1 week. Please advise.

## 2024-04-02 NOTE — ASSESSMENT & PLAN NOTE
Also complains of persistent dryness thick discharge will change Allegra-D to only plain Allegra 60 twice a day with continue Nasonex

## 2024-04-02 NOTE — ASSESSMENT & PLAN NOTE
LDL controlled    Lab Results   Component Value Date    LDLCALC 83 02/14/2024      Lab Results   Component Value Date    ALT 46 02/14/2024    ALT 47 (H) 09/21/2019        Agree continue management medication as follows    Crestor 10 mg daily with low-fat low-cholesterol diet

## 2024-05-02 PROBLEM — H60.313 ACUTE DIFFUSE OTITIS EXTERNA OF BOTH EARS: Status: RESOLVED | Noted: 2024-04-02 | Resolved: 2024-05-02

## 2024-07-09 ENCOUNTER — OFFICE VISIT (OUTPATIENT)
Dept: INTERNAL MEDICINE CLINIC | Facility: CLINIC | Age: 59
End: 2024-07-09
Payer: COMMERCIAL

## 2024-07-09 VITALS
OXYGEN SATURATION: 97 % | DIASTOLIC BLOOD PRESSURE: 80 MMHG | SYSTOLIC BLOOD PRESSURE: 130 MMHG | BODY MASS INDEX: 30.29 KG/M2 | HEART RATE: 75 BPM | WEIGHT: 193 LBS | HEIGHT: 67 IN

## 2024-07-09 DIAGNOSIS — J31.0 CHRONIC RHINITIS: ICD-10-CM

## 2024-07-09 DIAGNOSIS — M26.609 TMJ DYSFUNCTION: ICD-10-CM

## 2024-07-09 DIAGNOSIS — H60.313 ACUTE DIFFUSE OTITIS EXTERNA OF BOTH EARS: Primary | ICD-10-CM

## 2024-07-09 DIAGNOSIS — E78.2 MIXED HYPERLIPIDEMIA: ICD-10-CM

## 2024-07-09 PROCEDURE — 99213 OFFICE O/P EST LOW 20 MIN: CPT | Performed by: INTERNAL MEDICINE

## 2024-07-09 RX ORDER — NEOMYCIN SULFATE, POLYMYXIN B SULFATE AND HYDROCORTISONE 10; 3.5; 1 MG/ML; MG/ML; [USP'U]/ML
SUSPENSION/ DROPS AURICULAR (OTIC)
Qty: 10 ML | Refills: 0 | Status: SHIPPED | OUTPATIENT
Start: 2024-07-09

## 2024-07-09 RX ORDER — MELOXICAM 15 MG/1
15 TABLET ORAL DAILY PRN
Qty: 90 TABLET | Refills: 1 | Status: SHIPPED | OUTPATIENT
Start: 2024-07-09 | End: 2025-01-05

## 2024-07-09 RX ORDER — FEXOFENADINE HCL 60 MG/1
60 TABLET, FILM COATED ORAL 2 TIMES DAILY
Qty: 180 TABLET | Refills: 1 | Status: SHIPPED | OUTPATIENT
Start: 2024-07-09

## 2024-07-09 NOTE — ASSESSMENT & PLAN NOTE
Pain while eating left TMJ area suspected clenching teeth at nighttime advised to be seen by dentist might need a mouthpiece for now we will treat with Mobic 15 daily as needed

## 2024-07-09 NOTE — ASSESSMENT & PLAN NOTE
Pain both years with for the last 3 days after being in the swimming some redness and tendernessWill treat with Cortisporin suspension

## 2024-07-09 NOTE — PROGRESS NOTES
Dr. Iraheta's Office Visit Note  24     Ish Huggins 59 y.o. male MRN: [id number]  : 1965    Assessment:     1. Acute diffuse otitis externa of both ears  Assessment & Plan:  Pain both years with for the last 3 days after being in the swimming some redness and tendernessWill treat with Cortisporin suspension  Orders:  -     neomycin-polymyxin-hydrocortisone (CORTISPORIN) 0.35%-10,000 units/mL-1% otic suspension; 3 drops 3 times a day posterior for 1 week  2. Mixed hyperlipidemia  Assessment & Plan:  Last LDL reviewed very well-controlled    Agree and continue management medication follow    Crestor 10 mg daily with low-fat low-cholesterol diet  3. TMJ dysfunction  Assessment & Plan:  Pain while eating left TMJ area suspected clenching teeth at nighttime advised to be seen by dentist might need a mouthpiece for now we will treat with Mobic 15 daily as needed  Orders:  -     meloxicam (MOBIC) 15 mg tablet; Take 1 tablet (15 mg total) by mouth daily as needed for moderate pain  4. Chronic rhinitis  -     fexofenadine (ALLEGRA) 60 MG tablet; Take 1 tablet (60 mg total) by mouth 2 (two) times a day        Discussion Summary and Plan:  Today's care plan and medications were reviewed with patient in detail and all their questions answered to their satisfaction.    Chief Complaint   Patient presents with   • Follow-up     Wants ear drops two days ago was bothering get dry.  Having pain on the left side of the face by the ear for about a month.      Subjective:  Came in follow-up chronic medical condition listed visit diagnosis pain left TMJ when eating also some nasal congestion and earache for detail refer to assessment plan visit diagnosis        The following portions of the patient's history were reviewed and updated as appropriate: allergies, current medications, past family history, past medical history, past social history, past surgical history and problem list.    Review of Systems   Constitutional:   Positive for activity change. Negative for appetite change, chills, diaphoresis, fatigue, fever and unexpected weight change.   HENT:  Positive for ear pain and sinus pressure. Negative for congestion, dental problem, drooling, ear discharge, facial swelling, hearing loss, mouth sores, nosebleeds, postnasal drip, rhinorrhea, sneezing, sore throat, tinnitus, trouble swallowing and voice change.    Eyes:  Negative for photophobia, pain, discharge, redness, itching and visual disturbance.   Respiratory:  Negative for apnea, cough, choking, chest tightness, shortness of breath, wheezing and stridor.    Cardiovascular:  Negative for chest pain, palpitations and leg swelling.   Gastrointestinal:  Negative for abdominal distention, abdominal pain, anal bleeding, blood in stool, constipation, diarrhea, nausea, rectal pain and vomiting.   Endocrine: Negative for cold intolerance, heat intolerance, polydipsia, polyphagia and polyuria.   Genitourinary:  Negative for decreased urine volume, difficulty urinating, dysuria, enuresis, flank pain, frequency, genital sores, hematuria and urgency.   Musculoskeletal:  Negative for arthralgias, back pain, gait problem, joint swelling, myalgias, neck pain and neck stiffness.   Skin:  Negative for color change, pallor, rash and wound.   Allergic/Immunologic: Negative.  Negative for environmental allergies, food allergies and immunocompromised state.   Neurological:  Negative for dizziness, tremors, seizures, syncope, facial asymmetry, speech difficulty, weakness, light-headedness, numbness and headaches.   Psychiatric/Behavioral:  Negative for agitation, behavioral problems, confusion, decreased concentration, dysphoric mood, hallucinations, self-injury, sleep disturbance and suicidal ideas. The patient is not nervous/anxious and is not hyperactive.          Historical Information   Patient Active Problem List   Diagnosis   • Daytime hypersomnolence   • MARIO (obstructive sleep apnea)   •  Obstructive sleep apnea   • History of gastroesophageal reflux (GERD)   • Hyperlipidemia   • Chronic rhinitis   • Gastroesophageal reflux disease without esophagitis   • Steatohepatitis   • Class 1 obesity in adult   • Tubular adenoma of colon   • NAFLD (nonalcoholic fatty liver disease)   • Trichomonas infection   • Candida albicans infection   • Onychomycosis   • Prediabetes   • Acute diffuse otitis externa of both ears   • TMJ dysfunction     Past Medical History:   Diagnosis Date   • Abnormal weight gain     Last assessed 3/24/2014   • Allergic    • Anxiety    • Gastroesophageal reflux disease without esophagitis 3/2/2020   • GERD (gastroesophageal reflux disease)    • Hemorrhoids    • History of gastroesophageal reflux (GERD)    • Hyperlipidemia     Last assessed 3/24/2014    • Sleep apnea     insurance not covering     Past Surgical History:   Procedure Laterality Date   • COLONOSCOPY     • IR BIOPSY LIVER RANDOM  5/17/2021   • [place] PALATOPHARYNGOPLASTY N/A 3/13/2020    Procedure: UVULOPALATOPHARYNGOPLASTY (UPPP);  Surgeon: Antonio Olivia MD;  Location: [facility];  Service: ENT   • TONSILLECTOMY  03/16/2020   • UVULECTOMY  03/16/2020     Social History     Substance and Sexual Activity   Alcohol Use No     Social History     Substance and Sexual Activity   Drug Use No     Social History     Tobacco Use   Smoking Status Never   Smokeless Tobacco Never     Family History   Problem Relation Age of Onset   • Hypertension Mother    • No Known Problems Father    • COPD Maternal Uncle    • No Known Problems Sister    • No Known Problems Brother      Health Maintenance Due   Topic   • DTaP,Tdap,and Td Vaccines (1 - Tdap)   • Zoster Vaccine (1 of 2)   • Annual Physical    • COVID-19 Vaccine (4 - 2023-24 season)   • RSV Vaccine Age 60+ Years (1 - 1-dose 60+ series)   • Influenza Vaccine (1)      Meds/Allergies       Current Outpatient Medications:   •  albuterol (ProAir HFA) 90 mcg/act inhaler, Inhale 2 puffs every 6 (six)  "hours as needed (cough, trouble breathing), Disp: 8.5 g, Rfl: 0  •  fexofenadine (ALLEGRA) 60 MG tablet, Take 1 tablet (60 mg total) by mouth 2 (two) times a day, Disp: 180 tablet, Rfl: 1  •  fluticasone (FLONASE) 50 mcg/act nasal spray, 2 sprays into each nostril daily, Disp: 18.2 mL, Rfl: 0  •  meloxicam (MOBIC) 15 mg tablet, Take 1 tablet (15 mg total) by mouth daily as needed for moderate pain, Disp: 90 tablet, Rfl: 1  •  mupirocin (BACTROBAN) 2 % ointment, Apply topically 3 (three) times a day, Disp: 22 g, Rfl: 2  •  neomycin-polymyxin-hydrocortisone (CORTISPORIN) 0.35%-10,000 units/mL-1% otic suspension, 3 drops 3 times a day posterior for 1 week, Disp: 10 mL, Rfl: 0  •  olopatadine (PATANOL) 0.1 % ophthalmic solution, Administer 1 drop to the right eye 2 (two) times a day, Disp: 5 mL, Rfl: 0  •  omeprazole (PriLOSEC) 40 MG capsule, Take 1 capsule (40 mg total) by mouth daily, Disp: 30 capsule, Rfl: 3  •  rosuvastatin (CRESTOR) 10 MG tablet, Take 1 tablet (10 mg total) by mouth daily, Disp: 90 tablet, Rfl: 3  •  tamsulosin (FLOMAX) 0.4 mg, Take 0.8 mg by mouth daily, Disp: , Rfl:       Objective:    Vitals:   /80   Pulse 75   Ht 5' 7\" (1.702 m)   Wt 87.5 kg (193 lb)   SpO2 97%   BMI 30.23 kg/m²   Body mass index is 30.23 kg/m².  Vitals:    07/09/24 1057   Weight: 87.5 kg (193 lb)       Physical Exam  Vitals and nursing note reviewed.   Constitutional:       General: He is not in acute distress.     Appearance: He is well-developed. He is not ill-appearing, toxic-appearing or diaphoretic.   HENT:      Head: Normocephalic and atraumatic.      Right Ear: External ear normal.      Left Ear: External ear normal.      Nose: Nose normal.      Mouth/Throat:      Pharynx: No oropharyngeal exudate.   Eyes:      General: Lids are normal. Lids are everted, no foreign bodies appreciated. No scleral icterus.        Right eye: No discharge.         Left eye: No discharge.      Conjunctiva/sclera: Conjunctivae " normal.      Pupils: Pupils are equal, round, and reactive to light.   Neck:      Thyroid: No thyromegaly.      Vascular: Normal carotid pulses. No carotid bruit, hepatojugular reflux or JVD.      Trachea: No tracheal tenderness or tracheal deviation.   Cardiovascular:      Rate and Rhythm: Normal rate and regular rhythm.      Pulses: Normal pulses.      Heart sounds: Normal heart sounds. No murmur heard.     No friction rub. No gallop.   Pulmonary:      Effort: Pulmonary effort is normal. No respiratory distress.      Breath sounds: Normal breath sounds. No stridor. No wheezing or rales.   Chest:      Chest wall: No tenderness.   Abdominal:      General: Bowel sounds are normal. There is no distension.      Palpations: Abdomen is soft. There is no mass.      Tenderness: There is no abdominal tenderness. There is no guarding or rebound.   Musculoskeletal:         General: No tenderness or deformity. Normal range of motion.      Cervical back: Normal range of motion and neck supple. No edema, erythema or rigidity. No spinous process tenderness or muscular tenderness. Normal range of motion.   Lymphadenopathy:      Head:      Right side of head: No submental, submandibular, tonsillar, preauricular or posterior auricular adenopathy.      Left side of head: No submental, submandibular, tonsillar, preauricular, posterior auricular or occipital adenopathy.      Cervical: No cervical adenopathy.      Right cervical: No superficial, deep or posterior cervical adenopathy.     Left cervical: No superficial, deep or posterior cervical adenopathy.      Upper Body:      Right upper body: No pectoral adenopathy.      Left upper body: No pectoral adenopathy.   Skin:     General: Skin is warm and dry.      Coloration: Skin is not pale.      Findings: No erythema or rash.   Neurological:      General: No focal deficit present.      Mental Status: He is alert and oriented to person, place, and time.      Cranial Nerves: No cranial  nerve deficit.      Sensory: No sensory deficit.      Motor: No tremor, abnormal muscle tone or seizure activity.      Coordination: Coordination normal.      Gait: Gait normal.      Deep Tendon Reflexes: Reflexes are normal and symmetric. Reflexes normal.   Psychiatric:         Behavior: Behavior normal.         Thought Content: Thought content normal.         Judgment: Judgment normal.         Lab Review   No visits with results within 2 Month(s) from this visit.   Latest known visit with results is:   Hospital Outpatient Visit on 03/11/2024   Component Date Value Ref Range Status   • Case Report 03/11/2024    Final                    Value:Surgical Pathology Report                         Case: [id number]                                  Authorizing Provider:  Kena Valero MD       Collected:           03/11/2024 1231              Ordering Location:     [facility]   Received:            03/11/2024 1432                                     Center                                                                       Pathologist:           Hadely Schuster MD                                                            Specimens:   A) - Duodenum, Duodenum Biopsy Rule Out Celiac Disease                                              B) - Stomach, Gastric Biopsy Rule Out H.Pylori                                                      C) - Large Intestine, Left/Descending Colon, Descending Polyp cold snare                  • Final Diagnosis 03/11/2024    Final                    Value:A. Duodenum, biopsy:                           - Duodenal mucosa with no diagnostic abnormality.                           - Villous architecture is preserved and there is no intraepithelial                           lymphocytosis.                                                      B. Stomach, biopsy:                           - Gastric antral and oxyntic mucosa with no diagnostic abnormality.                           - No  H.pylori organisms identified on H&E-stained sections.                                                     C. Colon, descending, polyp, cold snare:                           - Tubular adenoma, negative for high-grade dysplasia.      • Additional Information 03/11/2024    Final                    Value:All reported additional testing was performed with appropriately reactive                           controls.  These tests were developed and their performance                           characteristics determined by [facility] or                           appropriate performing facility, though some tests may be performed on                           tissues which have not been validated for performance characteristics                           (such as staining performed on alcohol exposed cell blocks and decalcified                           tissues).  Results should be interpreted with caution and in the context                           of the patients’ clinical condition. These tests may not be cleared or                           approved by the U.S. Food and Drug Administration, though the FDA has                           determined that such clearance or approval is not necessary. These tests                           are used for clinical purposes and they should not be regarded as                           investigational or for research. This laboratory has been approved by CLIA                           88, designated as a high-complexity laboratory and is qualified to perform                           these tests.                                                    Interpretation performed at [facility]  [address] [id number].   • Synoptic Checklist 03/11/2024    Final                    Value:                            COLON/RECTUM POLYP FORM - GI - C                                                                                      ":    Adenoma(s)     • Gross Description 03/11/2024    Final                    Value:A. The specimen is received in formalin, labeled with the patient's name                           and hospital number, and is designated \" duodenum biopsy, rule out celiac                           disease\".  The specimen consists of 2 tan-pink tissue fragments measuring                           0.3-0.5 cm in greatest dimension.  Entirely submitted.  One screened                           cassette.                                                    B. The specimen is received in formalin, labeled with the patient's name                           and hospital number, and is designated \" stomach gastric biopsy, rule out                           H. pylori\".  The specimen consists of 3 tan tissue fragments measuring                           0.1-0.4 cm in greatest dimension.  Entirely submitted.  One screened                           cassette.                                                    C. The specimen is received in formalin, labeled with the patient's name                           and hospital number, and is designated \" descending colon polyp\".  The                           specimen consists of multiple tan-brown irregularly shaped soft tissue                           fragments versus food particles measuring in aggregate of 2.0 x 0.6 x 0.3                           cm.  The specimen is entirely submitted in a screened cassette.                                                    Note: The estimated total formalin fixation time based upon information                           provided by the submitting clinician and the standard processing schedule                           is under 72 hours. Tabby Shelley   • Clinical Information 03/11/2024    Final                    Value:INDICATION:  Esophageal dysphagia, Gastroesophageal reflux disease, unspecified whether esophagitis present   FINDINGS:  · Regular Z-line 40 cm " "from the incisors  · Mild, patchy erythematous mucosa in the prepyloric region; performed cold forceps biopsy to rule out H. pylori. Retroflexed view is unremarkable, pylorus is patent.  · The duodenal bulb, 1st part of the duodenum and 2nd part of the duodenum appeared normal. Performed random biopsy using biopsy forceps to rule out celiac disease.  · One adenomatous-appearing, semi-pedunculated polyp measuring smaller than 5 mm in the descending colon; performed cold snare with complete en bloc removal and retrieved specimen  · Internal small hemorrhoids  · Medium diverticula of moderate severity in the sigmoid colon and rectosigmoid         Patient Instructions   Pt is symptom free for this problem.  This diagnosis or problem is stable/well controlled  Patient is advised to continue same meds as outlined in medicine list  visit.        Vasquez Iraheta MD        \"This note has been constructed using a voice recognition system.Therefore there may be syntax, spelling, and/or grammatical errors. Please call if you have any questions. \"  "

## 2024-07-09 NOTE — PATIENT INSTRUCTIONS
Pt is symptom free for this problem.  This diagnosis or problem is stable/well controlled  Patient is advised to continue same meds as outlined in medicine list  visit.

## 2024-07-09 NOTE — ASSESSMENT & PLAN NOTE
Last LDL reviewed very well-controlled    Agree and continue management medication follow    Crestor 10 mg daily with low-fat low-cholesterol diet

## 2024-08-08 PROBLEM — H60.313 ACUTE DIFFUSE OTITIS EXTERNA OF BOTH EARS: Status: RESOLVED | Noted: 2024-04-02 | Resolved: 2024-08-08

## 2024-09-29 NOTE — PROGRESS NOTES
Assessment/Plan:  4+ staph aureus from nasal culture  Bactroban ointment prescribed again (refused by prior pharmacy)  F/u in 2 wks  No problem-specific Assessment & Plan notes found for this encounter  Diagnoses and all orders for this visit:    MRSA nasal colonization  -     mupirocin (BACTROBAN) 2 % nasal ointment; into each nostril 2 (two) times a day          Subjective:      Patient ID: Marita Martinez is a 61 y o  male  HPI    The following portions of the patient's history were reviewed and updated as appropriate: allergies, current medications, past family history, past medical history, past social history, past surgical history and problem list     Review of Systems      Objective:      /76 (BP Location: Left arm, Patient Position: Sitting, Cuff Size: Adult)   Pulse 79   Temp 98 2 °F (36 8 °C) (Temporal)   Resp 18   Ht 5' 7" (1 702 m)   Wt 87 5 kg (193 lb)   SpO2 97%   BMI 30 23 kg/m²          Physical Exam  Constitutional:       Appearance: He is well-developed  HENT:      Head: Normocephalic and atraumatic  Right Ear: Tympanic membrane, ear canal and external ear normal  No drainage  No middle ear effusion  Left Ear: Tympanic membrane, ear canal and external ear normal  No drainage  No middle ear effusion  Nose: Nose normal         Mouth/Throat:      Pharynx: Uvula midline  No oropharyngeal exudate  Tonsils: 0 on the right  0 on the left  Neck:      Thyroid: No thyroid mass or thyromegaly  Trachea: Trachea normal  No tracheal deviation  Lymphadenopathy:      Cervical: No cervical adenopathy  Neurological:      Mental Status: He is alert  62 Negative

## 2024-10-15 ENCOUNTER — OFFICE VISIT (OUTPATIENT)
Dept: INTERNAL MEDICINE CLINIC | Facility: CLINIC | Age: 59
End: 2024-10-15
Payer: COMMERCIAL

## 2024-10-15 VITALS
SYSTOLIC BLOOD PRESSURE: 108 MMHG | TEMPERATURE: 98.7 F | HEART RATE: 80 BPM | WEIGHT: 197 LBS | DIASTOLIC BLOOD PRESSURE: 72 MMHG | RESPIRATION RATE: 14 BRPM | OXYGEN SATURATION: 98 % | HEIGHT: 67 IN | BODY MASS INDEX: 30.92 KG/M2

## 2024-10-15 DIAGNOSIS — J30.1 NON-SEASONAL ALLERGIC RHINITIS DUE TO POLLEN: ICD-10-CM

## 2024-10-15 DIAGNOSIS — G47.00 INSOMNIA, UNSPECIFIED TYPE: ICD-10-CM

## 2024-10-15 DIAGNOSIS — Z13.0 SCREENING FOR DEFICIENCY ANEMIA: ICD-10-CM

## 2024-10-15 DIAGNOSIS — K75.81 STEATOHEPATITIS: ICD-10-CM

## 2024-10-15 DIAGNOSIS — R73.03 PREDIABETES: ICD-10-CM

## 2024-10-15 DIAGNOSIS — E78.2 MIXED HYPERLIPIDEMIA: ICD-10-CM

## 2024-10-15 DIAGNOSIS — K76.0 NAFLD (NONALCOHOLIC FATTY LIVER DISEASE): ICD-10-CM

## 2024-10-15 DIAGNOSIS — E55.9 VITAMIN D DEFICIENCY: ICD-10-CM

## 2024-10-15 DIAGNOSIS — R07.89 ATYPICAL CHEST PAIN: Primary | ICD-10-CM

## 2024-10-15 DIAGNOSIS — K21.9 GASTROESOPHAGEAL REFLUX DISEASE WITHOUT ESOPHAGITIS: ICD-10-CM

## 2024-10-15 PROCEDURE — 99214 OFFICE O/P EST MOD 30 MIN: CPT | Performed by: INTERNAL MEDICINE

## 2024-10-15 RX ORDER — FLUTICASONE PROPIONATE 50 MCG
SPRAY, SUSPENSION (ML) NASAL
Qty: 18.2 ML | Refills: 2 | Status: SHIPPED | OUTPATIENT
Start: 2024-10-15

## 2024-10-15 RX ORDER — ZOLPIDEM TARTRATE 10 MG/1
10 TABLET ORAL
Qty: 30 TABLET | Refills: 1 | Status: SHIPPED | OUTPATIENT
Start: 2024-10-15

## 2024-10-15 RX ORDER — LEVOCETIRIZINE DIHYDROCHLORIDE 5 MG/1
TABLET, FILM COATED ORAL
Qty: 30 TABLET | Refills: 3 | Status: SHIPPED | OUTPATIENT
Start: 2024-10-15

## 2024-10-15 RX ORDER — PANTOPRAZOLE SODIUM 40 MG/1
TABLET, DELAYED RELEASE ORAL
Qty: 30 TABLET | Refills: 5 | Status: SHIPPED | OUTPATIENT
Start: 2024-10-15

## 2024-10-15 NOTE — ASSESSMENT & PLAN NOTE
Episode of the heaviness and burning sensation retrosternal left-sided chest last about 4 hours very mild not associate with exertion no associated symptom of nausea sweating or difficulty breathing    Patient asymptomatic now    To be seen by cardiology for this atypical chest pain    Advised if recurrent symptoms go to the emergency room

## 2024-10-15 NOTE — PROGRESS NOTES
Dr. Iraheta's Office Visit Note  10/15/24     Ish Huggins 59 y.o. male MRN: [id number]  : 1965    Assessment:     1. Prediabetes  Assessment & Plan:  Previous A1c reviewed    Advised diabetic diet    Check A1c before next visit with urine for microalbumin  Orders:  -     Comprehensive metabolic panel; Future  -     Hemoglobin A1C; Future  -     Albumin / creatinine urine ratio; Future  -     Urinalysis with microscopic; Future  2. NAFLD (nonalcoholic fatty liver disease)  Assessment & Plan:  Previous LFT reviewed asymptomatic    Avoid hepatotoxic agents discussed at length diet exercise to lose weight    Check LFT before next visit  Orders:  -     Comprehensive metabolic panel; Future  3. Screening for deficiency anemia  -     CBC and differential; Future  4. Mixed hyperlipidemia  Assessment & Plan:  Previous LDL reviewed    Agree continue manage medication as follow    Crestor 10 mg daily with low-fat low-cholesterol diet    Repeat lipid profile with LFT before next visit      Orders:  -     Hemoglobin A1C; Future  -     Lipid Panel with Direct LDL reflex; Future  5. Steatohepatitis  Assessment & Plan:  Moderate hepatic steatosis-mild elevation of transaminases.  -encourage weight loss.  -avoid hepatotoxic medications and limit alcohol use.     Seen by GI     As above reviewed     Labs reviewed as follows.lastcmp    Repeat CMP before next visit  Orders:  -     Comprehensive metabolic panel; Future  6. Gastroesophageal reflux disease without esophagitis  Assessment & Plan:  Symptom of persistent heartburn especially at nighttime every day no nausea    Start Protonix 40 mg daily in the evening instructed about the diet antireflux measures at length  Orders:  -     pantoprazole (PROTONIX) 40 mg tablet; Take 1 pill an hour before dinner daily  -     Comprehensive metabolic panel; Future  7. Atypical chest pain  Assessment & Plan:  Episode of the heaviness and burning sensation retrosternal left-sided chest last  about 4 hours very mild not associate with exertion no associated symptom of nausea sweating or difficulty breathing    Patient asymptomatic now    To be seen by cardiology for this atypical chest pain    Advised if recurrent symptoms go to the emergency room  Orders:  -     Ambulatory Referral to Cardiology; Future  8. Non-seasonal allergic rhinitis due to pollen  Assessment & Plan:  Patient has nasal congestion postnasal drip triggering coughing dry    Start Flonase 1 puff each nostril twice a day    Additional 5 mg daily if persistent symptoms will add Astelin nasal spray  Orders:  -     levocetirizine (Xyzal Allergy 24HR) 5 MG tablet; 1 pill daily in the evening  -     fluticasone (FLONASE) 50 mcg/act nasal spray; 1 puff each nostril twice a day  9. Vitamin D deficiency        Discussion Summary and Plan:  Today's care plan and medications were reviewed with patient in detail and all their questions answered to their satisfaction.    Chief Complaint   Patient presents with    Physical Exam    Follow-up     Pressure in chest - ongoing for about 2 weeks - lasts for about 4 hours and will go away and then come back - last happened 3 days ago; tingling and numbness in both arms; GI upset (metallic taste in mouth); stiff neck; localized HA on top of L side of head    Warm sensation in sinuses - draining into throat (mucus gets stuck under eyes) - drinking a lot of water helps      Subjective:  Came in follow-up chronic medical condition nasal congestion postnasal drip triggering dry coughing also heartburn persistent associated symptoms burning and heaviness retrosternal left-sided chest  A week ago and also heartburn almost every night for the last 4 weeks for details refer to assessment plan visit diagnosis also given complete new set of labs to be done and to be seen by cardiology        The following portions of the patient's history were reviewed and updated as appropriate: allergies, current medications, past  family history, past medical history, past social history, past surgical history and problem list.    Review of Systems   Constitutional:  Positive for activity change. Negative for appetite change, chills, diaphoresis, fatigue, fever and unexpected weight change.   HENT:  Positive for congestion and postnasal drip. Negative for dental problem, drooling, ear discharge, ear pain, facial swelling, hearing loss, mouth sores, nosebleeds, rhinorrhea, sinus pressure, sneezing, sore throat, tinnitus, trouble swallowing and voice change.    Eyes:  Negative for photophobia, pain, discharge, redness, itching and visual disturbance.   Respiratory:  Negative for apnea, cough, choking, chest tightness, shortness of breath, wheezing and stridor.    Cardiovascular:  Negative for chest pain, palpitations and leg swelling.   Gastrointestinal:  Negative for abdominal distention, abdominal pain, anal bleeding, blood in stool, constipation, diarrhea, nausea, rectal pain and vomiting.   Endocrine: Negative for cold intolerance, heat intolerance, polydipsia, polyphagia and polyuria.   Genitourinary:  Negative for decreased urine volume, difficulty urinating, dysuria, enuresis, flank pain, frequency, genital sores, hematuria and urgency.   Musculoskeletal:  Negative for arthralgias, back pain, gait problem, joint swelling, myalgias, neck pain and neck stiffness.   Skin:  Negative for color change, pallor, rash and wound.   Allergic/Immunologic: Negative.  Negative for environmental allergies, food allergies and immunocompromised state.   Neurological:  Negative for dizziness, tremors, seizures, syncope, facial asymmetry, speech difficulty, weakness, light-headedness, numbness and headaches.   Psychiatric/Behavioral:  Negative for agitation, behavioral problems, confusion, decreased concentration, dysphoric mood, hallucinations, self-injury, sleep disturbance and suicidal ideas. The patient is not nervous/anxious and is not hyperactive.           Historical Information   Patient Active Problem List   Diagnosis    Daytime hypersomnolence    MARIO (obstructive sleep apnea)    Obstructive sleep apnea    History of gastroesophageal reflux (GERD)    Hyperlipidemia    Chronic rhinitis    Gastroesophageal reflux disease without esophagitis    Steatohepatitis    Class 1 obesity in adult    Tubular adenoma of colon    NAFLD (nonalcoholic fatty liver disease)    Trichomonas infection    Candida albicans infection    Onychomycosis    Prediabetes    TMJ dysfunction    Atypical chest pain    Non-seasonal allergic rhinitis due to pollen     Past Medical History:   Diagnosis Date    Abnormal weight gain     Last assessed 3/24/2014    Allergic     Anxiety     Gastroesophageal reflux disease without esophagitis 3/2/2020    GERD (gastroesophageal reflux disease)     Hemorrhoids     History of gastroesophageal reflux (GERD)     Hyperlipidemia     Last assessed 3/24/2014     Sleep apnea     insurance not covering     Past Surgical History:   Procedure Laterality Date    COLONOSCOPY      IR BIOPSY LIVER RANDOM  5/17/2021    MI PALATOPHARYNGOPLASTY N/A 3/13/2020    Procedure: UVULOPALATOPHARYNGOPLASTY (UPPP);  Surgeon: Antonio Olivia MD;  Location: [facility];  Service: ENT    TONSILLECTOMY  03/16/2020    UVULECTOMY  03/16/2020     Social History     Substance and Sexual Activity   Alcohol Use No     Social History     Substance and Sexual Activity   Drug Use No     Social History     Tobacco Use   Smoking Status Never   Smokeless Tobacco Never     Family History   Problem Relation Age of Onset    Hypertension Mother     No Known Problems Father     COPD Maternal Uncle     No Known Problems Sister     No Known Problems Brother      Health Maintenance Due   Topic    DTaP,Tdap,and Td Vaccines (1 - Tdap)    Zoster Vaccine (1 of 2)    Annual Physical     RSV Vaccine Age 60+ Years (1 - 1-dose 60+ series)    Influenza Vaccine (1)    COVID-19 Vaccine (4 - 2023-24 season)     "  Meds/Allergies       Current Outpatient Medications:     albuterol (ProAir HFA) 90 mcg/act inhaler, Inhale 2 puffs every 6 (six) hours as needed (cough, trouble breathing), Disp: 8.5 g, Rfl: 0    fexofenadine (ALLEGRA) 60 MG tablet, Take 1 tablet (60 mg total) by mouth 2 (two) times a day, Disp: 180 tablet, Rfl: 1    fluticasone (FLONASE) 50 mcg/act nasal spray, 1 puff each nostril twice a day, Disp: 18.2 mL, Rfl: 2    levocetirizine (Xyzal Allergy 24HR) 5 MG tablet, 1 pill daily in the evening, Disp: 30 tablet, Rfl: 3    meloxicam (MOBIC) 15 mg tablet, Take 1 tablet (15 mg total) by mouth daily as needed for moderate pain, Disp: 90 tablet, Rfl: 1    mupirocin (BACTROBAN) 2 % ointment, Apply topically 3 (three) times a day, Disp: 22 g, Rfl: 2    olopatadine (PATANOL) 0.1 % ophthalmic solution, Administer 1 drop to the right eye 2 (two) times a day, Disp: 5 mL, Rfl: 0    omeprazole (PriLOSEC) 40 MG capsule, Take 1 capsule (40 mg total) by mouth daily, Disp: 30 capsule, Rfl: 3    pantoprazole (PROTONIX) 40 mg tablet, Take 1 pill an hour before dinner daily, Disp: 30 tablet, Rfl: 5    rosuvastatin (CRESTOR) 10 MG tablet, Take 1 tablet (10 mg total) by mouth daily, Disp: 90 tablet, Rfl: 3    tamsulosin (FLOMAX) 0.4 mg, Take 0.8 mg by mouth daily, Disp: , Rfl:     neomycin-polymyxin-hydrocortisone (CORTISPORIN) 0.35%-10,000 units/mL-1% otic suspension, 3 drops 3 times a day posterior for 1 week (Patient not taking: Reported on 10/15/2024), Disp: 10 mL, Rfl: 0      Objective:    Vitals:   /72   Pulse 80   Temp 98.7 °F (37.1 °C)   Resp 14   Ht 5' 7\" (1.702 m)   Wt 89.4 kg (197 lb)   SpO2 98%   BMI 30.85 kg/m²   Body mass index is 30.85 kg/m².  Vitals:    10/15/24 1132   Weight: 89.4 kg (197 lb)       Physical Exam  Vitals and nursing note reviewed.   Constitutional:       General: He is not in acute distress.     Appearance: He is well-developed. He is obese. He is not ill-appearing, toxic-appearing or " diaphoretic.   HENT:      Head: Normocephalic and atraumatic.      Right Ear: External ear normal.      Left Ear: External ear normal.      Nose: Congestion and rhinorrhea present.      Mouth/Throat:      Pharynx: No oropharyngeal exudate.   Eyes:      General: Lids are normal. Lids are everted, no foreign bodies appreciated. No scleral icterus.        Right eye: No discharge.         Left eye: No discharge.      Conjunctiva/sclera: Conjunctivae normal.      Pupils: Pupils are equal, round, and reactive to light.   Neck:      Thyroid: No thyromegaly.      Vascular: Normal carotid pulses. No carotid bruit, hepatojugular reflux or JVD.      Trachea: No tracheal tenderness or tracheal deviation.   Cardiovascular:      Rate and Rhythm: Normal rate and regular rhythm.      Pulses: Normal pulses.      Heart sounds: Normal heart sounds. No murmur heard.     No friction rub. No gallop.   Pulmonary:      Effort: Pulmonary effort is normal. No respiratory distress.      Breath sounds: Normal breath sounds. No stridor. No wheezing or rales.   Chest:      Chest wall: No tenderness.   Abdominal:      General: Bowel sounds are normal. There is no distension.      Palpations: Abdomen is soft. There is no mass.      Tenderness: There is no abdominal tenderness. There is no guarding or rebound.   Musculoskeletal:         General: No tenderness or deformity. Normal range of motion.      Cervical back: Normal range of motion and neck supple. No edema, erythema or rigidity. No spinous process tenderness or muscular tenderness. Normal range of motion.   Lymphadenopathy:      Head:      Right side of head: No submental, submandibular, tonsillar, preauricular or posterior auricular adenopathy.      Left side of head: No submental, submandibular, tonsillar, preauricular, posterior auricular or occipital adenopathy.      Cervical: No cervical adenopathy.      Right cervical: No superficial, deep or posterior cervical adenopathy.     Left  cervical: No superficial, deep or posterior cervical adenopathy.      Upper Body:      Right upper body: No pectoral adenopathy.      Left upper body: No pectoral adenopathy.   Skin:     General: Skin is warm and dry.      Coloration: Skin is not pale.      Findings: No erythema or rash.   Neurological:      General: No focal deficit present.      Mental Status: He is alert and oriented to person, place, and time.      Cranial Nerves: No cranial nerve deficit.      Sensory: No sensory deficit.      Motor: No tremor, abnormal muscle tone or seizure activity.      Coordination: Coordination normal.      Gait: Gait normal.      Deep Tendon Reflexes: Reflexes are normal and symmetric. Reflexes normal.   Psychiatric:         Behavior: Behavior normal.         Thought Content: Thought content normal.         Judgment: Judgment normal.         Lab Review   No visits with results within 2 Month(s) from this visit.   Latest known visit with results is:   Hospital Outpatient Visit on 03/11/2024   Component Date Value Ref Range Status    Case Report 03/11/2024    Final                    Value:Surgical Pathology Report                         Case: [id number]                                  Authorizing Provider:  Kena Valero MD       Collected:           03/11/2024 1231              Ordering Location:     [facility]   Received:            03/11/2024 1432                                     Center                                                                       Pathologist:           Hadley Schuster MD                                                            Specimens:   A) - Duodenum, Duodenum Biopsy Rule Out Celiac Disease                                              B) - Stomach, Gastric Biopsy Rule Out H.Pylori                                                      C) - Large Intestine, Left/Descending Colon, Descending Polyp cold snare                   Final Diagnosis 03/11/2024    Final                     Value:A. Duodenum, biopsy:                           - Duodenal mucosa with no diagnostic abnormality.                           - Villous architecture is preserved and there is no intraepithelial                           lymphocytosis.                                                      B. Stomach, biopsy:                           - Gastric antral and oxyntic mucosa with no diagnostic abnormality.                           - No H.pylori organisms identified on H&E-stained sections.                                                     C. Colon, descending, polyp, cold snare:                           - Tubular adenoma, negative for high-grade dysplasia.       Additional Information 03/11/2024    Final                    Value:All reported additional testing was performed with appropriately reactive                           controls.  These tests were developed and their performance                           characteristics determined by [facility] or                           appropriate performing facility, though some tests may be performed on                           tissues which have not been validated for performance characteristics                           (such as staining performed on alcohol exposed cell blocks and decalcified                           tissues).  Results should be interpreted with caution and in the context                           of the patients’ clinical condition. These tests may not be cleared or                           approved by the U.S. Food and Drug Administration, though the FDA has                           determined that such clearance or approval is not necessary. These tests                           are used for clinical purposes and they should not be regarded as                           investigational or for research. This laboratory has been approved by CLIA                           88, designated as a high-complexity laboratory  "and is qualified to perform                           these tests.                                                    Interpretation performed at [facility] [address] [place] [address].    Synoptic Checklist 03/11/2024    Final                    Value:                            COLON/RECTUM POLYP FORM - GI - C                                                                                     :    Adenoma(s)      Gross Description 03/11/2024    Final                    Value:A. The specimen is received in formalin, labeled with the patient's name                           and hospital number, and is designated \" duodenum biopsy, rule out celiac                           disease\".  The specimen consists of 2 tan-pink tissue fragments measuring                           0.3-0.5 cm in greatest dimension.  Entirely submitted.  One screened                           cassette.                                                    B. The specimen is received in formalin, labeled with the patient's name                           and hospital number, and is designated \" stomach gastric biopsy, rule out                           H. pylori\".  The specimen consists of 3 tan tissue fragments measuring                           0.1-0.4 cm in greatest dimension.  Entirely submitted.  One screened                           cassette.                                                    C. The specimen is received in formalin, labeled with the patient's name                           and hospital number, and is designated \" descending colon polyp\".  The                           specimen consists of multiple tan-brown irregularly shaped soft tissue                           fragments versus food particles measuring in aggregate of 2.0 x 0.6 x 0.3                           cm.  The specimen is entirely submitted in a screened cassette.                                                    Note: " "The estimated total formalin fixation time based upon information                           provided by the submitting clinician and the standard processing schedule                           is under 72 hours. Tabby Shelley    Clinical Information 03/11/2024    Final                    Value:INDICATION:  Esophageal dysphagia, Gastroesophageal reflux disease, unspecified whether esophagitis present   FINDINGS:  · Regular Z-line 40 cm from the incisors  · Mild, patchy erythematous mucosa in the prepyloric region; performed cold forceps biopsy to rule out H. pylori. Retroflexed view is unremarkable, pylorus is patent.  · The duodenal bulb, 1st part of the duodenum and 2nd part of the duodenum appeared normal. Performed random biopsy using biopsy forceps to rule out celiac disease.  · One adenomatous-appearing, semi-pedunculated polyp measuring smaller than 5 mm in the descending colon; performed cold snare with complete en bloc removal and retrieved specimen  · Internal small hemorrhoids  · Medium diverticula of moderate severity in the sigmoid colon and rectosigmoid         There are no Patient Instructions on file for this visit.     Vasquez Iraheta MD        \"This note has been constructed using a voice recognition system.Therefore there may be syntax, spelling, and/or grammatical errors. Please call if you have any questions. \"  "

## 2024-10-15 NOTE — ASSESSMENT & PLAN NOTE
Moderate hepatic steatosis-mild elevation of transaminases.  -encourage weight loss.  -avoid hepatotoxic medications and limit alcohol use.     Seen by GI     As above reviewed     Labs reviewed as follows.lastcmp    Repeat CMP before next visit

## 2024-10-15 NOTE — ASSESSMENT & PLAN NOTE
Symptom of persistent heartburn especially at nighttime every day no nausea    Start Protonix 40 mg daily in the evening instructed about the diet antireflux measures at length

## 2024-10-15 NOTE — ASSESSMENT & PLAN NOTE
Patient has nasal congestion postnasal drip triggering coughing dry    Start Flonase 1 puff each nostril twice a day    Additional 5 mg daily if persistent symptoms will add Astelin nasal spray

## 2024-10-15 NOTE — ASSESSMENT & PLAN NOTE
Previous LDL reviewed    Agree continue manage medication as follow    Crestor 10 mg daily with low-fat low-cholesterol diet    Repeat lipid profile with LFT before next visit

## 2024-10-15 NOTE — ASSESSMENT & PLAN NOTE
Previous A1c reviewed    Advised diabetic diet    Check A1c before next visit with urine for microalbumin

## 2024-10-15 NOTE — ASSESSMENT & PLAN NOTE
Previous LFT reviewed asymptomatic    Avoid hepatotoxic agents discussed at length diet exercise to lose weight    Check LFT before next visit

## 2024-10-29 ENCOUNTER — APPOINTMENT (OUTPATIENT)
Dept: LAB | Facility: HOSPITAL | Age: 59
End: 2024-10-29
Attending: INTERNAL MEDICINE
Payer: COMMERCIAL

## 2024-10-29 DIAGNOSIS — K21.9 GASTROESOPHAGEAL REFLUX DISEASE WITHOUT ESOPHAGITIS: ICD-10-CM

## 2024-10-29 DIAGNOSIS — R73.03 PREDIABETES: ICD-10-CM

## 2024-10-29 DIAGNOSIS — Z13.0 SCREENING FOR DEFICIENCY ANEMIA: ICD-10-CM

## 2024-10-29 DIAGNOSIS — E78.2 MIXED HYPERLIPIDEMIA: ICD-10-CM

## 2024-10-29 DIAGNOSIS — K76.0 NAFLD (NONALCOHOLIC FATTY LIVER DISEASE): ICD-10-CM

## 2024-10-29 DIAGNOSIS — K75.81 STEATOHEPATITIS: ICD-10-CM

## 2024-10-29 LAB
ALBUMIN SERPL BCG-MCNC: 4.6 G/DL (ref 3.5–5)
ALP SERPL-CCNC: 74 U/L (ref 34–104)
ALT SERPL W P-5'-P-CCNC: 28 U/L (ref 7–52)
AMORPH URATE CRY URNS QL MICRO: ABNORMAL
ANION GAP SERPL CALCULATED.3IONS-SCNC: 6 MMOL/L (ref 4–13)
AST SERPL W P-5'-P-CCNC: 18 U/L (ref 13–39)
BACTERIA UR QL AUTO: ABNORMAL /HPF
BASOPHILS # BLD AUTO: 0.05 THOUSANDS/ΜL (ref 0–0.1)
BASOPHILS NFR BLD AUTO: 1 % (ref 0–1)
BILIRUB SERPL-MCNC: 1.07 MG/DL (ref 0.2–1)
BILIRUB UR QL STRIP: NEGATIVE
BUN SERPL-MCNC: 16 MG/DL (ref 5–25)
CALCIUM SERPL-MCNC: 9.1 MG/DL (ref 8.4–10.2)
CHLORIDE SERPL-SCNC: 103 MMOL/L (ref 96–108)
CHOLEST SERPL-MCNC: 158 MG/DL
CLARITY UR: CLEAR
CO2 SERPL-SCNC: 30 MMOL/L (ref 21–32)
COLOR UR: YELLOW
CREAT SERPL-MCNC: 0.94 MG/DL (ref 0.6–1.3)
CREAT UR-MCNC: 125 MG/DL
EOSINOPHIL # BLD AUTO: 0.07 THOUSAND/ΜL (ref 0–0.61)
EOSINOPHIL NFR BLD AUTO: 1 % (ref 0–6)
ERYTHROCYTE [DISTWIDTH] IN BLOOD BY AUTOMATED COUNT: 11.9 % (ref 11.6–15.1)
EST. AVERAGE GLUCOSE BLD GHB EST-MCNC: 123 MG/DL
GFR SERPL CREATININE-BSD FRML MDRD: 87 ML/MIN/1.73SQ M
GLUCOSE P FAST SERPL-MCNC: 105 MG/DL (ref 65–99)
GLUCOSE UR STRIP-MCNC: NEGATIVE MG/DL
HBA1C MFR BLD: 5.9 %
HCT VFR BLD AUTO: 47.9 % (ref 36.5–49.3)
HDLC SERPL-MCNC: 34 MG/DL
HGB BLD-MCNC: 15.8 G/DL (ref 12–17)
HGB UR QL STRIP.AUTO: NEGATIVE
IMM GRANULOCYTES # BLD AUTO: 0.05 THOUSAND/UL (ref 0–0.2)
IMM GRANULOCYTES NFR BLD AUTO: 1 % (ref 0–2)
KETONES UR STRIP-MCNC: NEGATIVE MG/DL
LDLC SERPL CALC-MCNC: 83 MG/DL (ref 0–100)
LEUKOCYTE ESTERASE UR QL STRIP: NEGATIVE
LYMPHOCYTES # BLD AUTO: 2.62 THOUSANDS/ΜL (ref 0.6–4.47)
LYMPHOCYTES NFR BLD AUTO: 30 % (ref 14–44)
MCH RBC QN AUTO: 29.4 PG (ref 26.8–34.3)
MCHC RBC AUTO-ENTMCNC: 33 G/DL (ref 31.4–37.4)
MCV RBC AUTO: 89 FL (ref 82–98)
MICROALBUMIN UR-MCNC: 7.4 MG/L
MICROALBUMIN/CREAT 24H UR: 6 MG/G CREATININE (ref 0–30)
MONOCYTES # BLD AUTO: 0.74 THOUSAND/ΜL (ref 0.17–1.22)
MONOCYTES NFR BLD AUTO: 9 % (ref 4–12)
NEUTROPHILS # BLD AUTO: 5.15 THOUSANDS/ΜL (ref 1.85–7.62)
NEUTS SEG NFR BLD AUTO: 58 % (ref 43–75)
NITRITE UR QL STRIP: NEGATIVE
NON-SQ EPI CELLS URNS QL MICRO: ABNORMAL /HPF
NRBC BLD AUTO-RTO: 0 /100 WBCS
PH UR STRIP.AUTO: 7.5 [PH]
PLATELET # BLD AUTO: 183 THOUSANDS/UL (ref 149–390)
PMV BLD AUTO: 13 FL (ref 8.9–12.7)
POTASSIUM SERPL-SCNC: 4.1 MMOL/L (ref 3.5–5.3)
PROT SERPL-MCNC: 7.3 G/DL (ref 6.4–8.4)
PROT UR STRIP-MCNC: NEGATIVE MG/DL
RBC # BLD AUTO: 5.38 MILLION/UL (ref 3.88–5.62)
RBC #/AREA URNS AUTO: ABNORMAL /HPF
SODIUM SERPL-SCNC: 139 MMOL/L (ref 135–147)
SP GR UR STRIP.AUTO: 1.02 (ref 1–1.03)
TRIGL SERPL-MCNC: 203 MG/DL
UROBILINOGEN UR STRIP-ACNC: <2 MG/DL
WBC # BLD AUTO: 8.68 THOUSAND/UL (ref 4.31–10.16)
WBC #/AREA URNS AUTO: ABNORMAL /HPF

## 2024-10-29 PROCEDURE — 80053 COMPREHEN METABOLIC PANEL: CPT

## 2024-10-29 PROCEDURE — 82043 UR ALBUMIN QUANTITATIVE: CPT

## 2024-10-29 PROCEDURE — 81001 URINALYSIS AUTO W/SCOPE: CPT

## 2024-10-29 PROCEDURE — 82570 ASSAY OF URINE CREATININE: CPT

## 2024-10-29 PROCEDURE — 85025 COMPLETE CBC W/AUTO DIFF WBC: CPT

## 2024-10-29 PROCEDURE — 83036 HEMOGLOBIN GLYCOSYLATED A1C: CPT

## 2024-10-29 PROCEDURE — 36415 COLL VENOUS BLD VENIPUNCTURE: CPT

## 2024-10-29 PROCEDURE — 80061 LIPID PANEL: CPT

## 2024-12-17 ENCOUNTER — OFFICE VISIT (OUTPATIENT)
Dept: INTERNAL MEDICINE CLINIC | Facility: CLINIC | Age: 59
End: 2024-12-17
Payer: COMMERCIAL

## 2024-12-17 VITALS
DIASTOLIC BLOOD PRESSURE: 80 MMHG | BODY MASS INDEX: 31.23 KG/M2 | SYSTOLIC BLOOD PRESSURE: 122 MMHG | WEIGHT: 199 LBS | OXYGEN SATURATION: 99 % | HEART RATE: 83 BPM | HEIGHT: 67 IN

## 2024-12-17 DIAGNOSIS — K76.0 NAFLD (NONALCOHOLIC FATTY LIVER DISEASE): ICD-10-CM

## 2024-12-17 DIAGNOSIS — J31.0 CHRONIC RHINITIS: ICD-10-CM

## 2024-12-17 DIAGNOSIS — R07.89 ATYPICAL CHEST PAIN: ICD-10-CM

## 2024-12-17 DIAGNOSIS — E78.2 MIXED HYPERLIPIDEMIA: Primary | ICD-10-CM

## 2024-12-17 PROCEDURE — 99213 OFFICE O/P EST LOW 20 MIN: CPT | Performed by: INTERNAL MEDICINE

## 2024-12-17 RX ORDER — MUPIROCIN 20 MG/G
OINTMENT TOPICAL 3 TIMES DAILY
Qty: 22 G | Refills: 2 | Status: SHIPPED | OUTPATIENT
Start: 2024-12-17

## 2024-12-17 NOTE — ASSESSMENT & PLAN NOTE
Episode of the heaviness and burning sensation retrosternal left-sided chest last about 4 hours very mild not associate with exertion no associated symptom of nausea sweating or difficulty breathing    Patient asymptomatic now    To be seen by cardiology for this atypical chest pain    Advised if recurrent symptoms go to the emergency room    Patient was seen in October given referral in October  No more episode of chest pain since the last visit  Still awaiting to be seen by cardiology for now asymptomatic no difficulty breathing

## 2024-12-17 NOTE — ASSESSMENT & PLAN NOTE
Lab Results   Component Value Date    LDLCALC 83 10/29/2024      Lab Results   Component Value Date    ALT 28 10/29/2024    AST 18 10/29/2024    ALKPHOS 74 10/29/2024   Above reviewed LDL controlled    Agree continue    Crestor 10 mg daily low-fat low-cholesterol diet

## 2024-12-17 NOTE — PROGRESS NOTES
Dr. Iraheta's Office Visit Note  24     Ish Huggins 59 y.o. male MRN: [id number]  : 1965    Assessment:     1. Mixed hyperlipidemia  Assessment & Plan:  Lab Results   Component Value Date    LDLCALC 83 10/29/2024      Lab Results   Component Value Date    ALT 28 10/29/2024    AST 18 10/29/2024    ALKPHOS 74 10/29/2024   Above reviewed LDL controlled    Agree continue    Crestor 10 mg daily low-fat low-cholesterol diet  2. NAFLD (nonalcoholic fatty liver disease)  Assessment & Plan:  Patient followed by GI    Asymptomatic    LFT reviewed normal    Avoid alcohol hepatotoxic drug  3. Atypical chest pain  Assessment & Plan:  Episode of the heaviness and burning sensation retrosternal left-sided chest last about 4 hours very mild not associate with exertion no associated symptom of nausea sweating or difficulty breathing    Patient asymptomatic now    To be seen by cardiology for this atypical chest pain    Advised if recurrent symptoms go to the emergency room    Patient was seen in October given referral in October  No more episode of chest pain since the last visit  Still awaiting to be seen by cardiology for now asymptomatic no difficulty breathing  4. Chronic rhinitis  -     mupirocin (BACTROBAN) 2 % ointment; Apply topically 3 (three) times a day        Discussion Summary and Plan:  Today's care plan and medications were reviewed with patient in detail and all their questions answered to their satisfaction.    Chief Complaint   Patient presents with   • Physical Exam     Discuss lab work      Subjective:  Came in for follow-up chronic medical condition list visit diagnosis patient denies any chest pain difficulty breathing seen by ENT for allergic rhinitis complains of excessive burping seen by GI in the past advised to be seen by GI again no nausea vomiting diarrhea or abdominal pain all the labs reviewed discussed at length        The following portions of the patient's history were reviewed and  updated as appropriate: allergies, current medications, past family history, past medical history, past social history, past surgical history and problem list.    Review of Systems   Constitutional:  Negative for activity change, appetite change, chills, diaphoresis, fatigue, fever and unexpected weight change.   HENT:  Negative for congestion, dental problem, drooling, ear discharge, ear pain, facial swelling, hearing loss, mouth sores, nosebleeds, postnasal drip, rhinorrhea, sinus pressure, sneezing, sore throat, tinnitus, trouble swallowing and voice change.    Eyes:  Negative for photophobia, pain, discharge, redness, itching and visual disturbance.   Respiratory:  Negative for apnea, cough, choking, chest tightness, shortness of breath, wheezing and stridor.    Cardiovascular:  Negative for chest pain, palpitations and leg swelling.   Gastrointestinal:  Negative for abdominal distention, abdominal pain, anal bleeding, blood in stool, constipation, diarrhea, nausea, rectal pain and vomiting.   Endocrine: Negative for cold intolerance, heat intolerance, polydipsia, polyphagia and polyuria.   Genitourinary:  Negative for decreased urine volume, difficulty urinating, dysuria, enuresis, flank pain, frequency, genital sores, hematuria and urgency.   Musculoskeletal:  Negative for arthralgias, back pain, gait problem, joint swelling, myalgias, neck pain and neck stiffness.   Skin:  Negative for color change, pallor, rash and wound.   Allergic/Immunologic: Negative.  Negative for environmental allergies, food allergies and immunocompromised state.   Neurological:  Negative for dizziness, tremors, seizures, syncope, facial asymmetry, speech difficulty, weakness, light-headedness, numbness and headaches.   Psychiatric/Behavioral:  Negative for agitation, behavioral problems, confusion, decreased concentration, dysphoric mood, hallucinations, self-injury, sleep disturbance and suicidal ideas. The patient is not  nervous/anxious and is not hyperactive.          Historical Information   Patient Active Problem List   Diagnosis   • Daytime hypersomnolence   • MARIO (obstructive sleep apnea)   • Obstructive sleep apnea   • History of gastroesophageal reflux (GERD)   • Hyperlipidemia   • Chronic rhinitis   • Gastroesophageal reflux disease without esophagitis   • Steatohepatitis   • Class 1 obesity in adult   • Tubular adenoma of colon   • NAFLD (nonalcoholic fatty liver disease)   • Trichomonas infection   • Candida albicans infection   • Onychomycosis   • Prediabetes   • TMJ dysfunction   • Atypical chest pain   • Non-seasonal allergic rhinitis due to pollen     Past Medical History:   Diagnosis Date   • Abnormal weight gain     Last assessed 3/24/2014   • Allergic    • Anxiety    • Gastroesophageal reflux disease without esophagitis 3/2/2020   • GERD (gastroesophageal reflux disease)    • Hemorrhoids    • History of gastroesophageal reflux (GERD)    • Hyperlipidemia     Last assessed 3/24/2014    • Sleep apnea     insurance not covering     Past Surgical History:   Procedure Laterality Date   • COLONOSCOPY     • IR BIOPSY LIVER RANDOM  5/17/2021   • CO PALATOPHARYNGOPLASTY N/A 3/13/2020    Procedure: UVULOPALATOPHARYNGOPLASTY (UPPP);  Surgeon: Antonio Olivia MD;  Location: [facility];  Service: ENT   • TONSILLECTOMY  03/16/2020   • UVULECTOMY  03/16/2020     Social History     Substance and Sexual Activity   Alcohol Use No     Social History     Substance and Sexual Activity   Drug Use No     Social History     Tobacco Use   Smoking Status Never   Smokeless Tobacco Never     Family History   Problem Relation Age of Onset   • Hypertension Mother    • No Known Problems Father    • COPD Maternal Uncle    • No Known Problems Sister    • No Known Problems Brother      Health Maintenance Due   Topic   • DTaP,Tdap,and Td Vaccines (1 - Tdap)   • Zoster Vaccine (1 of 2)   • Annual Physical    • Influenza Vaccine (1)   • COVID-19 Vaccine (4  "- 2024-25 season)      Meds/Allergies       Current Outpatient Medications:   •  albuterol (ProAir HFA) 90 mcg/act inhaler, Inhale 2 puffs every 6 (six) hours as needed (cough, trouble breathing), Disp: 8.5 g, Rfl: 0  •  fexofenadine (ALLEGRA) 60 MG tablet, Take 1 tablet (60 mg total) by mouth 2 (two) times a day, Disp: 180 tablet, Rfl: 1  •  fluticasone (FLONASE) 50 mcg/act nasal spray, 1 puff each nostril twice a day, Disp: 18.2 mL, Rfl: 2  •  levocetirizine (Xyzal Allergy 24HR) 5 MG tablet, 1 pill daily in the evening, Disp: 30 tablet, Rfl: 3  •  meloxicam (MOBIC) 15 mg tablet, Take 1 tablet (15 mg total) by mouth daily as needed for moderate pain, Disp: 90 tablet, Rfl: 1  •  mupirocin (BACTROBAN) 2 % ointment, Apply topically 3 (three) times a day, Disp: 22 g, Rfl: 2  •  olopatadine (PATANOL) 0.1 % ophthalmic solution, Administer 1 drop to the right eye 2 (two) times a day, Disp: 5 mL, Rfl: 0  •  pantoprazole (PROTONIX) 40 mg tablet, Take 1 pill an hour before dinner daily, Disp: 30 tablet, Rfl: 5  •  rosuvastatin (CRESTOR) 10 MG tablet, Take 1 tablet (10 mg total) by mouth daily, Disp: 90 tablet, Rfl: 3  •  tamsulosin (FLOMAX) 0.4 mg, Take 0.8 mg by mouth daily, Disp: , Rfl:   •  zolpidem (AMBIEN) 10 mg tablet, Take 1 tablet (10 mg total) by mouth daily at bedtime as needed for sleep, Disp: 30 tablet, Rfl: 1      Objective:    Vitals:   /80   Pulse 83   Ht 5' 7\" (1.702 m)   Wt 90.3 kg (199 lb)   SpO2 99%   BMI 31.17 kg/m²   Body mass index is 31.17 kg/m².  Vitals:    12/17/24 1108   Weight: 90.3 kg (199 lb)       Physical Exam  Vitals and nursing note reviewed.   Constitutional:       General: He is not in acute distress.     Appearance: He is well-developed. He is not ill-appearing, toxic-appearing or diaphoretic.   HENT:      Head: Normocephalic and atraumatic.      Right Ear: External ear normal.      Left Ear: External ear normal.      Nose: Nose normal.      Mouth/Throat:      Pharynx: No " oropharyngeal exudate.   Eyes:      General: Lids are normal. Lids are everted, no foreign bodies appreciated. No scleral icterus.        Right eye: No discharge.         Left eye: No discharge.      Conjunctiva/sclera: Conjunctivae normal.      Pupils: Pupils are equal, round, and reactive to light.   Neck:      Thyroid: No thyromegaly.      Vascular: Normal carotid pulses. No carotid bruit, hepatojugular reflux or JVD.      Trachea: No tracheal tenderness or tracheal deviation.   Cardiovascular:      Rate and Rhythm: Normal rate and regular rhythm.      Pulses: Normal pulses.      Heart sounds: Normal heart sounds. No murmur heard.     No friction rub. No gallop.   Pulmonary:      Effort: Pulmonary effort is normal. No respiratory distress.      Breath sounds: Normal breath sounds. No stridor. No wheezing or rales.   Chest:      Chest wall: No tenderness.   Abdominal:      General: Bowel sounds are normal. There is no distension.      Palpations: Abdomen is soft. There is no mass.      Tenderness: There is no abdominal tenderness. There is no guarding or rebound.   Musculoskeletal:         General: No tenderness or deformity. Normal range of motion.      Cervical back: Normal range of motion and neck supple. No edema, erythema or rigidity. No spinous process tenderness or muscular tenderness. Normal range of motion.   Lymphadenopathy:      Head:      Right side of head: No submental, submandibular, tonsillar, preauricular or posterior auricular adenopathy.      Left side of head: No submental, submandibular, tonsillar, preauricular, posterior auricular or occipital adenopathy.      Cervical: No cervical adenopathy.      Right cervical: No superficial, deep or posterior cervical adenopathy.     Left cervical: No superficial, deep or posterior cervical adenopathy.      Upper Body:      Right upper body: No pectoral adenopathy.      Left upper body: No pectoral adenopathy.   Skin:     General: Skin is warm and dry.       Coloration: Skin is not pale.      Findings: No erythema or rash.   Neurological:      General: No focal deficit present.      Mental Status: He is alert and oriented to person, place, and time.      Cranial Nerves: No cranial nerve deficit.      Sensory: No sensory deficit.      Motor: No tremor, abnormal muscle tone or seizure activity.      Coordination: Coordination normal.      Gait: Gait normal.      Deep Tendon Reflexes: Reflexes are normal and symmetric. Reflexes normal.   Psychiatric:         Behavior: Behavior normal.         Thought Content: Thought content normal.         Judgment: Judgment normal.         Lab Review   Appointment on 10/29/2024   Component Date Value Ref Range Status   • WBC 10/29/2024 8.68  4.31 - 10.16 Thousand/uL Final   • RBC 10/29/2024 5.38  3.88 - 5.62 Million/uL Final   • Hemoglobin 10/29/2024 15.8  12.0 - 17.0 g/dL Final   • Hematocrit 10/29/2024 47.9  36.5 - 49.3 % Final   • MCV 10/29/2024 89  82 - 98 fL Final   • MCH 10/29/2024 29.4  26.8 - 34.3 pg Final   • MCHC 10/29/2024 33.0  31.4 - 37.4 g/dL Final   • RDW 10/29/2024 11.9  11.6 - 15.1 % Final   • MPV 10/29/2024 13.0 (H)  8.9 - 12.7 fL Final   • Platelets 10/29/2024 183  149 - 390 Thousands/uL Final   • nRBC 10/29/2024 0  /100 WBCs Final   • Segmented % 10/29/2024 58  43 - 75 % Final   • Immature Grans % 10/29/2024 1  0 - 2 % Final   • Lymphocytes % 10/29/2024 30  14 - 44 % Final   • Monocytes % 10/29/2024 9  4 - 12 % Final   • Eosinophils Relative 10/29/2024 1  0 - 6 % Final   • Basophils Relative 10/29/2024 1  0 - 1 % Final   • Absolute Neutrophils 10/29/2024 5.15  1.85 - 7.62 Thousands/µL Final   • Absolute Immature Grans 10/29/2024 0.05  0.00 - 0.20 Thousand/uL Final   • Absolute Lymphocytes 10/29/2024 2.62  0.60 - 4.47 Thousands/µL Final   • Absolute Monocytes 10/29/2024 0.74  0.17 - 1.22 Thousand/µL Final   • Eosinophils Absolute 10/29/2024 0.07  0.00 - 0.61 Thousand/µL Final   • Basophils Absolute 10/29/2024 0.05   0.00 - 0.10 Thousands/µL Final   • Sodium 10/29/2024 139  135 - 147 mmol/L Final   • Potassium 10/29/2024 4.1  3.5 - 5.3 mmol/L Final   • Chloride 10/29/2024 103  96 - 108 mmol/L Final   • CO2 10/29/2024 30  21 - 32 mmol/L Final   • ANION GAP 10/29/2024 6  4 - 13 mmol/L Final   • BUN 10/29/2024 16  5 - 25 mg/dL Final   • Creatinine 10/29/2024 0.94  0.60 - 1.30 mg/dL Final    Standardized to IDMS reference method   • Glucose, Fasting 10/29/2024 105 (H)  65 - 99 mg/dL Final   • Calcium 10/29/2024 9.1  8.4 - 10.2 mg/dL Final   • AST 10/29/2024 18  13 - 39 U/L Final   • ALT 10/29/2024 28  7 - 52 U/L Final    Specimen collection should occur prior to Sulfasalazine administration due to the potential for falsely depressed results.    • Alkaline Phosphatase 10/29/2024 74  34 - 104 U/L Final   • Total Protein 10/29/2024 7.3  6.4 - 8.4 g/dL Final   • Albumin 10/29/2024 4.6  3.5 - 5.0 g/dL Final   • Total Bilirubin 10/29/2024 1.07 (H)  0.20 - 1.00 mg/dL Final    Use of this assay is not recommended for patients undergoing treatment with eltrombopag due to the potential for falsely elevated results.  N-acetyl-p-benzoquinone imine (metabolite of Acetaminophen) will generate erroneously low results in samples for patients that have taken an overdose of Acetaminophen.   • eGFR 10/29/2024 87  ml/min/1.73sq m Final   • Hemoglobin A1C 10/29/2024 5.9 (H)  Normal 4.0-5.6%; PreDiabetic 5.7-6.4%; Diabetic >=6.5%; Glycemic control for adults with diabetes <7.0% % Final   • EAG 10/29/2024 123  mg/dl Final   • Cholesterol 10/29/2024 158  See Comment mg/dL Final    Cholesterol:         Pediatric <18 Years        Desirable          <170 mg/dL      Borderline High    170-199 mg/dL      High               >=200 mg/dL        Adult >=18 Years            Desirable         <200 mg/dL      Borderline High   200-239 mg/dL      High              >239 mg/dL     • Triglycerides 10/29/2024 203 (H)  See Comment mg/dL Final    Triglyceride:     0-9Y             <75mg/dL     10Y-17Y         <90 mg/dL       >=18Y     Normal          <150 mg/dL     Borderline High 150-199 mg/dL     High            200-499 mg/dL        Very High       >499 mg/dL    Specimen collection should occur prior to Metamizole administration due to the potential for falsely depressed results.   • HDL, Direct 10/29/2024 34 (L)  >=40 mg/dL Final   • LDL Calculated 10/29/2024 83  0 - 100 mg/dL Final    LDL Cholesterol:     Optimal           <100 mg/dl     Near Optimal      100-129 mg/dl     Above Optimal       Borderline High 130-159 mg/dl       High            160-189 mg/dl       Very High       >189 mg/dl         This screening LDL is a calculated result.   It does not have the accuracy of the Direct Measured LDL in the monitoring of patients with hyperlipidemia and/or statin therapy.   Direct Measure LDL (NPO501) must be ordered separately in these patients.   • Creatinine, Ur 10/29/2024 125.0  Reference range not established. mg/dL Final   • Albumin,U,Random 10/29/2024 7.4  <20.0 mg/L Final   • Albumin Creat Ratio 10/29/2024 6  0 - 30 mg/g creatinine Final   • Color, UA 10/29/2024 Yellow   Final   • Clarity, UA 10/29/2024 Clear   Final   • Specific Gravity, UA 10/29/2024 1.020  1.005 - 1.030 Final   • pH, UA 10/29/2024 7.5  4.5, 5.0, 5.5, 6.0, 6.5, 7.0, 7.5, 8.0 Final   • Leukocytes, UA 10/29/2024 Negative  Negative Final   • Nitrite, UA 10/29/2024 Negative  Negative Final   • Protein, UA 10/29/2024 Negative  Negative mg/dl Final   • Glucose, UA 10/29/2024 Negative  Negative mg/dl Final   • Ketones, UA 10/29/2024 Negative  Negative mg/dl Final   • Urobilinogen, UA 10/29/2024 <2.0  <2.0 mg/dl mg/dl Final   • Bilirubin, UA 10/29/2024 Negative  Negative Final   • Occult Blood, UA 10/29/2024 Negative  Negative Final   • RBC, UA 10/29/2024 0-1 (A)  None Seen, 2-4 /hpf Final   • WBC, UA 10/29/2024 0-1 (A)  None Seen, 2-4, 5-60 /hpf Final   • Epithelial Cells 10/29/2024 None Seen  None Seen, Occasional  "/hpf Final   • Bacteria, UA 10/29/2024 Occasional  None Seen, Occasional /hpf Final   • Amorphous Crystals, UA 10/29/2024 Moderate   Final         Patient Instructions   Pt is symptom free for this problem.  This diagnosis or problem is stable/well controlled  Patient is advised to continue same meds as outlined in medicine list  Pt is advised to continue to follow with specialist if they are following for this problme  Pt should get blood test or other testing as per specialist ( or myself) prior to your next visit.        Vasquez Iraheta MD        \"This note has been constructed using a voice recognition system.Therefore there may be syntax, spelling, and/or grammatical errors. Please call if you have any questions. \"  "

## 2025-01-03 ENCOUNTER — TELEPHONE (OUTPATIENT)
Age: 60
End: 2025-01-03

## 2025-01-07 ENCOUNTER — OFFICE VISIT (OUTPATIENT)
Age: 60
End: 2025-01-07
Payer: COMMERCIAL

## 2025-01-07 VITALS
WEIGHT: 193 LBS | OXYGEN SATURATION: 97 % | DIASTOLIC BLOOD PRESSURE: 92 MMHG | HEART RATE: 86 BPM | SYSTOLIC BLOOD PRESSURE: 124 MMHG | BODY MASS INDEX: 30.29 KG/M2 | HEIGHT: 67 IN

## 2025-01-07 DIAGNOSIS — G44.209 TENSION HEADACHE: Primary | ICD-10-CM

## 2025-01-07 DIAGNOSIS — R20.0 LEFT ARM NUMBNESS: ICD-10-CM

## 2025-01-07 PROCEDURE — 99205 OFFICE O/P NEW HI 60 MIN: CPT

## 2025-01-07 RX ORDER — AMITRIPTYLINE HYDROCHLORIDE 10 MG/1
10 TABLET ORAL
Qty: 30 TABLET | Refills: 6 | Status: SHIPPED | OUTPATIENT
Start: 2025-01-07

## 2025-01-07 RX ORDER — AZELASTINE HYDROCHLORIDE 137 UG/1
SPRAY, METERED NASAL 2 TIMES DAILY
COMMUNITY
Start: 2024-12-09

## 2025-01-07 NOTE — PROGRESS NOTES
Name: Ish Huggins      : 1965      MRN: [id number]  Encounter Provider: Last Scott MD  Encounter Date: 2025   Encounter department: [facility]  :  Assessment & Plan  Tension headache    Orders:    amitriptyline (ELAVIL) 10 mg tablet; Take 1 tablet (10 mg total) by mouth daily at bedtime    MRI cervical spine wo contrast; Future    EMG 2 Limb Upper Extremity; Future    Ambulatory Referral to Physical Therapy; Future      Left arm numbness       Patient is a 59 year old male with PMH of MARIO, who presents for evaluation of headaches. He gets headaches 4x/week. Pain lasts 2-4 hours if he takes advil. Pain is in the bioccpital and left temporal region. Pain is described as pulsating/pressure sensation. Pain on average is 7/10. Associated with blurred vision, lacrimation. Denies photophobia, phonophobia.    He also has neck pain, which often radiates up and leads to her headaches. Neck pain leads to left shoulder pain. In addition, he complains of numbness of his left hand - the first 3 fingers and pinky. Patient's headaches have features of both tension and migraine headaches. Symptoms in his left arm are likely due to cervical radiculopathy vs focal neuropathy in his left arm.    Plan:  - Start amitriptyline 10mg daily   - Ordered MRI-C-spine without contrast and EMG/NCS of upper extremities  - Ordered physical therapy for neck pain  - Follow up in 4 months            History of Present Illness   HPI    Ish Huggins is a 59 year old male with PMH of MARIO, who presents for evaluation of headaches.     He gets headaches 4x/week. Pain lasts 2-4 hours if he takes advil. Pain is in the bioccpital and left temporal region. Pain is described as pulsating/pressure sensation. Pain on average is 7/10. Associated with blurred vision, lacrimation. Denies photophobia, phonophobia. Denies focal weakness or numbness with headache. Denies difficulty with speech.     He also has  neck pain, which often radiates up and leads to her headaches. Neck pain also leads to left shoulder pain. In addition, he complains of numbness of his left hand - the first 3 fingers and pinky. He occasionally has spasms/tingling of his left arm where he has to shake his left hand off. He denies any weakness of his left arm or hand.     No history of cardiac arrhythmias or CAD.       Social Hx:  - He works as a   - Denies tobacco smoking, ETOH or illicit drug use  - He lives with wife    Medical history review:  Tobacco use: denies    Headaches:   Any family history of migraines? yes      Any family history of aneurysms? Yes - cousin who was a smoker      Have you seen someone else for headaches or pain? no      Headaches started at what age? 56      What is your current pain level? 7/10      How often do the headaches occur? 4x/week      Are you ever headache free? yes      Aura/Warning and how long does it last? no      How long do the headaches last? 2-4 hours      Where is your headache located? Biocciptial, left temproal      Associated focal signs - focal weakness/numbness, facial weakness, dizziness? no      Positional component to headaches  - worse while standing or sitting? no      Quality of pain:  [x] Throbbing  [x] Pulsing   [] Achy  [] Tight band  [] Dull   [] Electrical  [] Stabbing  [] Pressure  [] Burning  [] Searing   [] Shooting  [] Other:    Associated symptoms:   [] Decreased appetite  [] Nausea   []Vomiting   []Diarrhea  [] Photophobia   []Phonophobia       []Osmophobia  [] Lacrimation  [] Nasal congestion/rhinorrhea    [] Dizziness  [] light headed  [x] Blurred vision   []Tinnitus       Number of days missed per month because of headaches:  -Miss school or work? yes    Headache triggers:    Any identified triggers for your headache? no    How many caffeine products to drink a day? 8 oz    How much water to drink a day? 64 oz      What medications do you take or have you taken  "for your headaches/pain/mood?   Preventive therapy:   Current:      Previously failed:    Abortive Therapy:   Current: advil    Previously failed:            Do you have neck pain? yes    Sleep Habit:  How many hours do you sleep? 5 hours      Review of Systems   Constitutional:  Positive for fatigue. Negative for appetite change and fever.   HENT: Negative.  Negative for hearing loss, tinnitus, trouble swallowing and voice change.    Eyes:  Positive for photophobia, pain and visual disturbance.   Respiratory: Negative.  Negative for shortness of breath.    Cardiovascular:  Positive for palpitations.   Gastrointestinal: Negative.  Negative for nausea and vomiting.   Endocrine: Negative.  Negative for cold intolerance.   Genitourinary: Negative.  Negative for dysuria, frequency and urgency.   Musculoskeletal:  Positive for neck pain and neck stiffness. Negative for back pain, gait problem and myalgias.   Skin: Negative.  Negative for rash.   Allergic/Immunologic: Negative.    Neurological:  Positive for light-headedness, numbness and headaches. Negative for dizziness, tremors, seizures, syncope, facial asymmetry, speech difficulty and weakness.        Numbness and tingling on top of scalp    Hematological: Negative.  Does not bruise/bleed easily.   Psychiatric/Behavioral:  Positive for sleep disturbance. Negative for confusion and hallucinations.     I have personally reviewed the MA's review of systems and made changes as necessary.         Objective   There were no vitals taken for this visit.    Physical Exam  Neurological Exam    /92 (Patient Position: Sitting, Cuff Size: Adult)   Pulse 86   Ht 5' 7\" (1.702 m)   Wt 87.5 kg (193 lb)   SpO2 97%   BMI 30.23 kg/m²      General Exam  General: well developed, no acute distress.  HEENT: mucous membranes moist, anicteric sclera.   Neck: supple, good ROM.      Neurological Exam  Mental Status: awake, alert, and fully oriented to person, place, time, and " situation. Attention and memory intact. Fund of knowledge is appropriate for age and education.  There is no neglect.    Language: fluency, and comprehension normal.       Cranial Nerves: Pupils equal and reactive to light.  Visual fields full to confrontation. Extraocular motions intact with full versions, normal pursuits and saccades. Facial strength full and symmetric. Facial sensation intact in V1-V3. Hearing intact to voice. Tongue protrudes to midline. Palate elevates symmetrically. Speech clear without notable dysarthria. Shoulder shrug activation full and symmetric.    Motor: Normal bulk and tone. No pronator drift. Strength is 5/5 proximally and distally in all 4 extremities. No involuntary movements.  *Negative Phalen and Tinels sign    Sensory: Sensation intact to light touch distally in all extremities.    Coordination: Normal finger-to-nose. Normal rapid alternating movements.     Station and gait: Casual and tandem gait normal. Normal Romberg.    Reflexes: Reflexes 1+ throughout and symmetric.       Administrative Statements   I have spent a total time of 60 minutes in caring for this patient on the day of the visit/encounter including Diagnostic results, Prognosis, Risks and benefits of tx options, Instructions for management, Patient and family education, Importance of tx compliance, Risk factor reductions, Impressions, Counseling / Coordination of care, Documenting in the medical record, Reviewing / ordering tests, medicine, procedures  , and Obtaining or reviewing history  .

## 2025-03-18 ENCOUNTER — OFFICE VISIT (OUTPATIENT)
Dept: INTERNAL MEDICINE CLINIC | Facility: CLINIC | Age: 60
End: 2025-03-18
Payer: COMMERCIAL

## 2025-03-18 VITALS
HEART RATE: 79 BPM | DIASTOLIC BLOOD PRESSURE: 90 MMHG | BODY MASS INDEX: 29.82 KG/M2 | OXYGEN SATURATION: 98 % | SYSTOLIC BLOOD PRESSURE: 138 MMHG | HEIGHT: 67 IN | WEIGHT: 190 LBS

## 2025-03-18 DIAGNOSIS — J31.0 CHRONIC RHINITIS: ICD-10-CM

## 2025-03-18 DIAGNOSIS — L01.00 IMPETIGO: ICD-10-CM

## 2025-03-18 DIAGNOSIS — G47.33 OBSTRUCTIVE SLEEP APNEA: Primary | ICD-10-CM

## 2025-03-18 DIAGNOSIS — E78.2 MIXED HYPERLIPIDEMIA: ICD-10-CM

## 2025-03-18 DIAGNOSIS — Z00.00 ANNUAL PHYSICAL EXAM: ICD-10-CM

## 2025-03-18 DIAGNOSIS — G44.209 TENSION HEADACHE: ICD-10-CM

## 2025-03-18 DIAGNOSIS — G47.00 INSOMNIA, UNSPECIFIED TYPE: ICD-10-CM

## 2025-03-18 PROCEDURE — 99213 OFFICE O/P EST LOW 20 MIN: CPT | Performed by: INTERNAL MEDICINE

## 2025-03-18 PROCEDURE — 99396 PREV VISIT EST AGE 40-64: CPT | Performed by: INTERNAL MEDICINE

## 2025-03-18 RX ORDER — ROSUVASTATIN CALCIUM 10 MG/1
10 TABLET, COATED ORAL DAILY
Qty: 90 TABLET | Refills: 3 | Status: SHIPPED | OUTPATIENT
Start: 2025-03-18

## 2025-03-18 RX ORDER — MUPIROCIN 20 MG/G
OINTMENT TOPICAL 3 TIMES DAILY
Qty: 22 G | Refills: 2 | Status: SHIPPED | OUTPATIENT
Start: 2025-03-18

## 2025-03-18 RX ORDER — ZOLPIDEM TARTRATE 10 MG/1
10 TABLET ORAL
Qty: 30 TABLET | Refills: 1 | Status: SHIPPED | OUTPATIENT
Start: 2025-03-18

## 2025-03-18 NOTE — PROGRESS NOTES
Adult Annual Physical  Name: Ish Huggins      : 1965      MRN: [id number]  Encounter Provider: Vasquez Iraheta MD  Encounter Date: 3/18/2025   Encounter department: [facility] INTERNAL MEDICINE    Assessment & Plan  Mixed hyperlipidemia  Previous LDL reviewed very well-controlled agree continue main medication for  Crestor 10 mg daily no side effects  Orders:  •  rosuvastatin (CRESTOR) 10 MG tablet; Take 1 tablet (10 mg total) by mouth daily    Tension headache  Seen by neurology on amitriptyline 10 mg daily at bedtime awaiting MRI C-spine follow-up with the neurology       Insomnia, unspecified type  Unable to sleep Ambien helping to sleep and also helps to relieve the headaches  Orders:  •  zolpidem (AMBIEN) 10 mg tablet; Take 1 tablet (10 mg total) by mouth daily at bedtime as needed for sleep    Annual physical exam  Complete annual physical    Preventive measure discussed    Age-appropriate screening done    Refused HIV screening hepatitis C screen    Counseling screening follow-up recommendations see attached encounter and discharge instruction       Obstructive sleep apnea  Still feeling weak tired fatigue sleepy in the daytime similar symptom when patient was diagnosed sleep apnea in the daytime has not sleep sleep medicine for more than 7 years not done a repeat study done on not sure of the settings corrects patient needs to be reevaluated although been using CPAP machine as per patient daily basis    Consult sleep medicine for reassess treatment and repeat the sleep study might need to change in the setting  Orders:  •  Ambulatory Referral to Sleep Medicine; Future    Impetigo  Nasal area  Orders:  •  mupirocin (BACTROBAN) 2 % ointment; Apply topically 3 (three) times a day    Preventive Screenings:  - Diabetes Screening: screening up-to-date  - Cholesterol Screening: screening not indicated and has hyperlipidemia   - Hepatitis C screening: screening up-to-date   - HIV  screening: screening up-to-date   - Colon cancer screening: screening up-to-date   - Lung cancer screening: screening not indicated   - Prostate cancer screening: patient declines     Immunizations:  - Immunizations due: Influenza, Tdap and Zoster (Shingrix)  - Risks/benefits immunizations discussed      Counseling/Anticipatory Guidance:  - Alcohol: discussed moderation in alcohol intake and recommendations for healthy alcohol use.   - Drug use: discussed harms of illicit drug use and how it can negatively impact mental/physical health.   - Tobacco use: discussed harms of tobacco use and management options for quitting.   - Dental health: discussed importance of regular tooth brushing, flossing, and dental visits.   - Sexual health: discussed sexually transmitted diseases, partner selection, use of condoms, avoidance of unintended pregnancy, and contraceptive alternatives.   - Diet: discussed recommendations for a healthy/well-balanced diet.   - Exercise: the importance of regular exercise/physical activity was discussed. Recommend exercise 3-5 times per week for at least 30 minutes.   - Injury prevention: discussed safety/seat belts, safety helmets, smoke detectors, carbon monoxide detectors, and smoking near bedding or upholstery.          History of Present Illness   Came in for follow-up chronic medical condition list visit diagnosis    Feeling weak tired fatigue has not seen sleep medicine for more than 7 years using CPAP at nighttime not sure the setting is correct since patient has symptoms of obstructive sleep apnea so referral given to be seen by sleep medicine    Denies any chest pain difficulty breathing seen by neurology headaches improving awaiting MRI cervical spine for details refer to assessment plan visit diagnosis    Adult Annual Physical:  Patient presents for annual physical.     Diet and Physical Activity:  - Diet/Nutrition: no special diet.  - Exercise: walking and 30-60 minutes on  average.    General Health:  - Sleep: sleeps well and 7-8 hours of sleep on average.  - Hearing: normal hearing bilateral ears.  - Vision: wears glasses, vision problems and most recent eye exam > 1 year ago.  - Dental: regular dental visits and brushes teeth twice daily.     Health:  - History of STDs: no.   - Urinary symptoms: none.     Advanced Care Planning:  - Has an advanced directive?: no    - Has a durable medical POA?: no    - ACP document given to patient?: no      Review of Systems   Constitutional:  Positive for activity change and fatigue. Negative for appetite change, chills, diaphoresis, fever and unexpected weight change.   HENT:  Negative for congestion, dental problem, drooling, ear discharge, ear pain, facial swelling, hearing loss, mouth sores, nosebleeds, postnasal drip, rhinorrhea, sinus pressure, sneezing, sore throat, tinnitus, trouble swallowing and voice change.    Eyes:  Negative for photophobia, pain, discharge, redness, itching and visual disturbance.   Respiratory:  Negative for apnea, cough, choking, chest tightness, shortness of breath, wheezing and stridor.    Cardiovascular:  Negative for chest pain, palpitations and leg swelling.   Gastrointestinal:  Negative for abdominal distention, abdominal pain, anal bleeding, blood in stool, constipation, diarrhea, nausea, rectal pain and vomiting.   Endocrine: Negative for cold intolerance, heat intolerance, polydipsia, polyphagia and polyuria.   Genitourinary:  Negative for decreased urine volume, difficulty urinating, dysuria, enuresis, flank pain, frequency, genital sores, hematuria and urgency.   Musculoskeletal:  Negative for arthralgias, back pain, gait problem, joint swelling, myalgias, neck pain and neck stiffness.   Skin:  Negative for color change, pallor, rash and wound.   Allergic/Immunologic: Negative.  Negative for environmental allergies, food allergies and immunocompromised state.   Neurological:  Negative for dizziness,  "tremors, seizures, syncope, facial asymmetry, speech difficulty, weakness, light-headedness, numbness and headaches.   Psychiatric/Behavioral:  Negative for agitation, behavioral problems, confusion, decreased concentration, dysphoric mood, hallucinations, self-injury, sleep disturbance and suicidal ideas. The patient is not nervous/anxious and is not hyperactive.          Objective   /90   Pulse 79   Ht 5' 7\" (1.702 m)   Wt 86.2 kg (190 lb)   SpO2 98%   BMI 29.76 kg/m²     Physical Exam  Vitals and nursing note reviewed.   Constitutional:       General: He is not in acute distress.     Appearance: He is well-developed. He is not ill-appearing, toxic-appearing or diaphoretic.   HENT:      Head: Normocephalic and atraumatic.      Right Ear: External ear normal.      Left Ear: External ear normal.      Nose: Nose normal.      Mouth/Throat:      Pharynx: No oropharyngeal exudate.   Eyes:      General: Lids are normal. Lids are everted, no foreign bodies appreciated. No scleral icterus.        Right eye: No discharge.         Left eye: No discharge.      Conjunctiva/sclera: Conjunctivae normal.      Pupils: Pupils are equal, round, and reactive to light.   Neck:      Thyroid: No thyromegaly.      Vascular: Normal carotid pulses. No carotid bruit, hepatojugular reflux or JVD.      Trachea: No tracheal tenderness or tracheal deviation.   Cardiovascular:      Rate and Rhythm: Normal rate and regular rhythm.      Pulses: Normal pulses.      Heart sounds: Normal heart sounds. No murmur heard.     No friction rub. No gallop.   Pulmonary:      Effort: Pulmonary effort is normal. No respiratory distress.      Breath sounds: Normal breath sounds. No stridor. No wheezing or rales.   Chest:      Chest wall: No tenderness.   Abdominal:      General: Bowel sounds are normal. There is no distension.      Palpations: Abdomen is soft. There is no mass.      Tenderness: There is no abdominal tenderness. There is no guarding " or rebound.   Musculoskeletal:         General: No tenderness or deformity. Normal range of motion.      Cervical back: Normal range of motion and neck supple. No edema, erythema or rigidity. No spinous process tenderness or muscular tenderness. Normal range of motion.   Lymphadenopathy:      Head:      Right side of head: No submental, submandibular, tonsillar, preauricular or posterior auricular adenopathy.      Left side of head: No submental, submandibular, tonsillar, preauricular, posterior auricular or occipital adenopathy.      Cervical: No cervical adenopathy.      Right cervical: No superficial, deep or posterior cervical adenopathy.     Left cervical: No superficial, deep or posterior cervical adenopathy.      Upper Body:      Right upper body: No pectoral adenopathy.      Left upper body: No pectoral adenopathy.   Skin:     General: Skin is warm and dry.      Coloration: Skin is not pale.      Findings: No erythema or rash.   Neurological:      General: No focal deficit present.      Mental Status: He is alert and oriented to person, place, and time.      Cranial Nerves: No cranial nerve deficit.      Sensory: No sensory deficit.      Motor: No tremor, abnormal muscle tone or seizure activity.      Coordination: Coordination normal.      Gait: Gait normal.      Deep Tendon Reflexes: Reflexes are normal and symmetric. Reflexes normal.   Psychiatric:         Behavior: Behavior normal.         Thought Content: Thought content normal.         Judgment: Judgment normal.

## 2025-03-18 NOTE — ASSESSMENT & PLAN NOTE
Previous LDL reviewed very well-controlled agree continue main medication for  Crestor 10 mg daily no side effects  Orders:  •  rosuvastatin (CRESTOR) 10 MG tablet; Take 1 tablet (10 mg total) by mouth daily

## 2025-03-18 NOTE — PATIENT INSTRUCTIONS
"Patient Education     Routine physical for adults   The Basics   Written by the doctors and editors at [facility]   What is a physical? -- A physical is a routine visit, or \"check-up,\" with your doctor. You might also hear it called a \"wellness visit\" or \"preventive visit.\"  During each visit, the doctor will:   Ask about your physical and mental health   Ask about your habits, behaviors, and lifestyle   Do an exam   Give you vaccines if needed   Talk to you about any medicines you take   Give advice about your health   Answer your questions  Getting regular check-ups is an important part of taking care of your health. It can help your doctor find and treat any problems you have. But it's also important for preventing health problems.  A routine physical is different from a \"sick visit.\" A sick visit is when you see a doctor because of a health concern or problem. Since physicals are scheduled ahead of time, you can think about what you want to ask the doctor.  How often should I get a physical? -- It depends on your age and health. In general, for people age 21 years and older:   If you are younger than 50 years, you might be able to get a physical every 3 years.   If you are 50 years or older, your doctor might recommend a physical every year.  If you have an ongoing health condition, like diabetes or high blood pressure, your doctor will probably want to see you more often.  What happens during a physical? -- In general, each visit will include:   Physical exam - The doctor or nurse will check your height, weight, heart rate, and blood pressure. They will also look at your eyes and ears. They will ask about how you are feeling and whether you have any symptoms that bother you.   Medicines - It's a good idea to bring a list of all the medicines you take to each doctor visit. Your doctor will talk to you about your medicines and answer any questions. Tell them if you are having any side effects that bother you. You " "should also tell them if you are having trouble paying for any of your medicines.   Habits and behaviors - This includes:   Your diet   Your exercise habits   Whether you smoke, drink alcohol, or use drugs   Whether you are sexually active   Whether you feel safe at home  Your doctor will talk to you about things you can do to improve your health and lower your risk of health problems. They will also offer help and support. For example, if you want to quit smoking, they can give you advice and might prescribe medicines. If you want to improve your diet or get more physical activity, they can help you with this, too.   Lab tests, if needed - The tests you get will depend on your age and situation. For example, your doctor might want to check your:   Cholesterol   Blood sugar   Iron level   Vaccines - The recommended vaccines will depend on your age, health, and what vaccines you already had. Vaccines are very important because they can prevent certain serious or deadly infections.   Discussion of screening - \"Screening\" means checking for diseases or other health problems before they cause symptoms. Your doctor can recommend screening based on your age, risk, and preferences. This might include tests to check for:   Cancer, such as breast, prostate, cervical, ovarian, colorectal, prostate, lung, or skin cancer   Sexually transmitted infections, such as chlamydia and gonorrhea   Mental health conditions like depression and anxiety  Your doctor will talk to you about the different types of screening tests. They can help you decide which screenings to have. They can also explain what the results might mean.   Answering questions - The physical is a good time to ask the doctor or nurse questions about your health. If needed, they can refer you to other doctors or specialists, too.  Adults older than 65 years often need other care, too. As you get older, your doctor will talk to you about:   How to prevent falling at " home   Hearing or vision tests   Memory testing   How to take your medicines safely   Making sure that you have the help and support you need at home  All topics are updated as new evidence becomes available and our peer review process is complete.  This topic retrieved from [employer] on: May 02, 2024.  Topic 939576 Version 1.0  Release: 32.4.3 - C32.122  © 2024 Up[employer]Date, Inc[employer] and/or its affiliates. All rights reserved.  Consumer Information Use and Disclaimer   Disclaimer: This generalized information is a limited summary of diagnosis, treatment, and/or medication information. It is not meant to be comprehensive and should be used as a tool to help the user understand and/or assess potential diagnostic and treatment options. It does NOT include all information about conditions, treatments, medications, side effects, or risks that may apply to a specific patient. It is not intended to be medical advice or a substitute for the medical advice, diagnosis, or treatment of a health care provider based on the health care provider's examination and assessment of a patient's specific and unique circumstances. Patients must speak with a health care provider for complete information about their health, medical questions, and treatment options, including any risks or benefits regarding use of medications. This information does not endorse any treatments or medications as safe, effective, or approved for treating a specific patient. Up[employer], Inc[employer] and its affiliates disclaim any warranty or liability relating to this information or the use thereof.The use of this information is governed by the Terms of Use, available at https://www.woltersZipzoomuwer.com/en/know/clinical-effectiveness-terms. 2024© UpToDate, Inc. and its affiliates and/or licensors. All rights reserved.  Copyright   © 2024 [employer], [employer] and/or its affiliates. All rights reserved.

## 2025-03-18 NOTE — ASSESSMENT & PLAN NOTE
Still feeling weak tired fatigue sleepy in the daytime similar symptom when patient was diagnosed sleep apnea in the daytime has not sleep sleep medicine for more than 7 years not done a repeat study done on not sure of the settings corrects patient needs to be reevaluated although been using CPAP machine as per patient daily basis    Consult sleep medicine for reassess treatment and repeat the sleep study might need to change in the setting  Orders:  •  Ambulatory Referral to Sleep Medicine; Future

## 2025-06-10 ENCOUNTER — RA CDI HCC (OUTPATIENT)
Dept: OTHER | Facility: HOSPITAL | Age: 60
End: 2025-06-10

## 2025-08-19 ENCOUNTER — OFFICE VISIT (OUTPATIENT)
Age: 60
End: 2025-08-19
Payer: COMMERCIAL

## 2025-08-19 VITALS
BODY MASS INDEX: 29.82 KG/M2 | TEMPERATURE: 99.6 F | HEART RATE: 78 BPM | HEIGHT: 67 IN | WEIGHT: 190 LBS | OXYGEN SATURATION: 98 % | SYSTOLIC BLOOD PRESSURE: 126 MMHG | RESPIRATION RATE: 16 BRPM | DIASTOLIC BLOOD PRESSURE: 82 MMHG

## 2025-08-19 DIAGNOSIS — E78.2 MIXED HYPERLIPIDEMIA: ICD-10-CM

## 2025-08-19 DIAGNOSIS — L29.0 ANAL ITCHING: Primary | ICD-10-CM

## 2025-08-19 PROCEDURE — 99213 OFFICE O/P EST LOW 20 MIN: CPT

## 2025-08-19 RX ORDER — ROSUVASTATIN CALCIUM 10 MG/1
10 TABLET, COATED ORAL DAILY
Qty: 90 TABLET | Refills: 3 | Status: SHIPPED | OUTPATIENT
Start: 2025-08-19

## 2025-08-19 RX ORDER — MUPIROCIN 2 %
OINTMENT (GRAM) TOPICAL 3 TIMES DAILY
Qty: 22 G | Refills: 2 | Status: CANCELLED | OUTPATIENT
Start: 2025-08-19

## 2025-08-19 RX ORDER — HYDROCORTISONE 25 MG/ML
LOTION TOPICAL 2 TIMES DAILY PRN
Qty: 59 ML | Refills: 0 | Status: SHIPPED | OUTPATIENT
Start: 2025-08-19

## 2025-08-20 ENCOUNTER — TELEPHONE (OUTPATIENT)
Age: 60
End: 2025-08-20

## (undated) DEVICE — ANTI-FOG SOLUTION WITH FOAM PAD: Brand: DEVON

## (undated) DEVICE — STRAIGHT CATH RED RUBBER 12FR

## (undated) DEVICE — GLOVE SRG BIOGEL ORTHOPEDIC 7

## (undated) DEVICE — ELECTRODE BLADE MOD E-Z CLEAN  2.75IN 7CM -0012AM

## (undated) DEVICE — TUBING SUCTION 5MM X 12 FT

## (undated) DEVICE — 3000CC GUARDIAN II: Brand: GUARDIAN

## (undated) DEVICE — CAUTERY TIP POLISHER: Brand: DEVON

## (undated) DEVICE — STERILE BETHLEHEM T AND A PACK: Brand: CARDINAL HEALTH

## (undated) DEVICE — SYRINGE BULB 2 OZ

## (undated) DEVICE — SUCTION BOVIE ENT

## (undated) DEVICE — PENCIL ELECTROSURG E-Z CLEAN -0035H